# Patient Record
Sex: FEMALE | Race: WHITE | NOT HISPANIC OR LATINO | Employment: OTHER | URBAN - METROPOLITAN AREA
[De-identification: names, ages, dates, MRNs, and addresses within clinical notes are randomized per-mention and may not be internally consistent; named-entity substitution may affect disease eponyms.]

---

## 2017-05-04 ENCOUNTER — CONVERSION ENCOUNTER (OUTPATIENT)
Dept: OTHER | Facility: HOSPITAL | Age: 62
End: 2017-05-04

## 2018-02-12 ENCOUNTER — OFFICE VISIT CONVERTED (OUTPATIENT)
Dept: FAMILY MEDICINE CLINIC | Age: 63
End: 2018-02-12
Attending: NURSE PRACTITIONER

## 2018-03-22 ENCOUNTER — OFFICE VISIT CONVERTED (OUTPATIENT)
Dept: FAMILY MEDICINE CLINIC | Age: 63
End: 2018-03-22
Attending: NURSE PRACTITIONER

## 2018-04-30 ENCOUNTER — OFFICE VISIT CONVERTED (OUTPATIENT)
Dept: FAMILY MEDICINE CLINIC | Age: 63
End: 2018-04-30
Attending: NURSE PRACTITIONER

## 2018-07-23 ENCOUNTER — OFFICE VISIT CONVERTED (OUTPATIENT)
Dept: FAMILY MEDICINE CLINIC | Age: 63
End: 2018-07-23
Attending: NURSE PRACTITIONER

## 2018-08-08 ENCOUNTER — CONVERSION ENCOUNTER (OUTPATIENT)
Dept: OTHER | Facility: HOSPITAL | Age: 63
End: 2018-08-08

## 2018-09-11 ENCOUNTER — HOSPITAL ENCOUNTER (OUTPATIENT)
Dept: CARDIOLOGY | Facility: HOSPITAL | Age: 63
Discharge: HOME | End: 2018-09-11
Attending: SURGERY

## 2018-09-11 ENCOUNTER — TRANSCRIBE ORDERS (OUTPATIENT)
Dept: ADMINISTRATIVE | Facility: HOSPITAL | Age: 63
End: 2018-09-11

## 2018-09-11 ENCOUNTER — HOSPITAL ENCOUNTER (OUTPATIENT)
Dept: CARDIOLOGY | Facility: HOSPITAL | Age: 63
Discharge: HOME | End: 2018-09-11
Admitting: SURGERY

## 2018-09-11 DIAGNOSIS — I10 ESSENTIAL HYPERTENSION, MALIGNANT: ICD-10-CM

## 2018-09-11 DIAGNOSIS — I10 ESSENTIAL HYPERTENSION, MALIGNANT: Primary | ICD-10-CM

## 2018-09-11 PROCEDURE — 93005 ELECTROCARDIOGRAM TRACING: CPT | Performed by: SURGERY

## 2018-09-11 PROCEDURE — 93010 ELECTROCARDIOGRAM REPORT: CPT | Performed by: INTERNAL MEDICINE

## 2018-12-10 ENCOUNTER — OFFICE VISIT CONVERTED (OUTPATIENT)
Dept: FAMILY MEDICINE CLINIC | Age: 63
End: 2018-12-10
Attending: NURSE PRACTITIONER

## 2019-02-13 ENCOUNTER — OFFICE VISIT CONVERTED (OUTPATIENT)
Dept: FAMILY MEDICINE CLINIC | Age: 64
End: 2019-02-13
Attending: NURSE PRACTITIONER

## 2019-06-17 ENCOUNTER — OFFICE VISIT CONVERTED (OUTPATIENT)
Dept: FAMILY MEDICINE CLINIC | Age: 64
End: 2019-06-17
Attending: NURSE PRACTITIONER

## 2019-06-17 ENCOUNTER — HOSPITAL ENCOUNTER (OUTPATIENT)
Dept: OTHER | Facility: HOSPITAL | Age: 64
Discharge: HOME | End: 2019-06-17
Attending: NURSE PRACTITIONER

## 2019-06-17 LAB
ALBUMIN SERPL-MCNC: 4.2 G/DL (ref 3.5–5)
ALBUMIN/GLOB SERPL: 1.4 {RATIO} (ref 1.4–2.6)
ALP SERPL-CCNC: 115 U/L (ref 43–160)
ALT SERPL-CCNC: 14 U/L (ref 10–40)
ANION GAP SERPL CALC-SCNC: 16 MMOL/L (ref 8–19)
AST SERPL-CCNC: 15 U/L (ref 15–50)
BILIRUB SERPL-MCNC: 0.35 MG/DL (ref 0.2–1.3)
BUN SERPL-MCNC: 13 MG/DL (ref 5–25)
BUN/CREAT SERPL: 13 {RATIO} (ref 6–20)
CALCIUM SERPL-MCNC: 9.6 MG/DL (ref 8.7–10.4)
CHLORIDE SERPL-SCNC: 102 MMOL/L (ref 99–111)
CHOLEST SERPL-MCNC: 185 MG/DL (ref 107–200)
CHOLEST/HDLC SERPL: 2.9 {RATIO} (ref 3–6)
CONV CO2: 28 MMOL/L (ref 22–32)
CONV CREATININE URINE, RANDOM: 114 MG/DL (ref 10–300)
CONV MICROALBUM.,U,RANDOM: <12 MG/L (ref 0–20)
CONV TOTAL PROTEIN: 7.1 G/DL (ref 6.3–8.2)
CREAT UR-MCNC: 1 MG/DL (ref 0.5–0.9)
EST. AVERAGE GLUCOSE BLD GHB EST-MCNC: 137 MG/DL
GFR SERPLBLD BASED ON 1.73 SQ M-ARVRAT: 60 ML/MIN/{1.73_M2}
GLOBULIN UR ELPH-MCNC: 2.9 G/DL (ref 2–3.5)
GLUCOSE SERPL-MCNC: 134 MG/DL (ref 65–99)
HBA1C MFR BLD: 6.4 % (ref 3.5–5.7)
HDLC SERPL-MCNC: 63 MG/DL (ref 40–60)
LDLC SERPL CALC-MCNC: 105 MG/DL (ref 70–100)
MICROALBUMIN/CREAT UR: 10.5 MG/G{CRE} (ref 0–35)
OSMOLALITY SERPL CALC.SUM OF ELEC: 296 MOSM/KG (ref 273–304)
POTASSIUM SERPL-SCNC: 4.3 MMOL/L (ref 3.5–5.3)
SODIUM SERPL-SCNC: 142 MMOL/L (ref 135–147)
TRIGL SERPL-MCNC: 84 MG/DL (ref 40–150)
TSH SERPL-ACNC: 2.38 M[IU]/L (ref 0.27–4.2)
VLDLC SERPL-MCNC: 17 MG/DL (ref 5–37)

## 2019-07-16 ENCOUNTER — OFFICE VISIT CONVERTED (OUTPATIENT)
Dept: FAMILY MEDICINE CLINIC | Age: 64
End: 2019-07-16
Attending: NURSE PRACTITIONER

## 2019-09-24 ENCOUNTER — HOSPITAL ENCOUNTER (OUTPATIENT)
Dept: OTHER | Facility: HOSPITAL | Age: 64
Discharge: HOME | End: 2019-09-24
Attending: NURSE PRACTITIONER

## 2019-10-08 ENCOUNTER — OFFICE VISIT CONVERTED (OUTPATIENT)
Dept: FAMILY MEDICINE CLINIC | Age: 64
End: 2019-10-08
Attending: NURSE PRACTITIONER

## 2019-11-26 ENCOUNTER — HOSPITAL ENCOUNTER (OUTPATIENT)
Dept: OTHER | Facility: HOSPITAL | Age: 64
Discharge: HOME | End: 2019-11-26
Attending: NURSE PRACTITIONER

## 2019-11-26 LAB
APPEARANCE UR: ABNORMAL
BACTERIA UR CULT: NORMAL
BACTERIA UR QL AUTO: ABNORMAL
BILIRUB UR QL: NEGATIVE
CASTS URNS QL MICRO: ABNORMAL /[LPF]
COLOR UR: YELLOW
CONV LEUKOCYTE ESTERASE: ABNORMAL
CONV UROBILINOGEN IN URINE BY AUTOMATED TEST STRIP: 0.2 {EHRLICHU}/DL (ref 0.1–1)
EPI CELLS #/AREA URNS HPF: ABNORMAL /[HPF]
GLUCOSE 24H UR-MCNC: NEGATIVE MG/DL
HGB UR QL STRIP: ABNORMAL
KETONES UR QL STRIP: NEGATIVE MG/DL
MUCOUS THREADS URNS QL MICRO: ABNORMAL
NITRITE UR-MCNC: POSITIVE MG/ML
PH UR STRIP.AUTO: 7 [PH] (ref 5–8)
PROT UR-MCNC: NEGATIVE MG/DL
RBC # BLD AUTO: ABNORMAL /[HPF]
SP GR UR STRIP: 1.02 (ref 1–1.03)
SPECIMEN SOURCE: ABNORMAL
UNIDENT CRYS URNS QL MICRO: ABNORMAL /[HPF]
WBC #/AREA URNS HPF: ABNORMAL /[HPF]

## 2019-11-28 LAB
AMOXICILLIN+CLAV SUSC ISLT: <=2
AMPICILLIN SUSC ISLT: <=2
AMPICILLIN+SULBAC SUSC ISLT: <=2
BACTERIA UR CULT: ABNORMAL
CEFAZOLIN SUSC ISLT: <=4
CEFEPIME SUSC ISLT: <=1
CEFTAZIDIME SUSC ISLT: <=1
CEFTRIAXONE SUSC ISLT: <=1
CEFUROXIME ORAL SUSC ISLT: <=1
CEFUROXIME PARENTER SUSC ISLT: <=1
CIPROFLOXACIN SUSC ISLT: <=0.25
ERTAPENEM SUSC ISLT: <=0.5
GENTAMICIN SUSC ISLT: <=1
LEVOFLOXACIN SUSC ISLT: <=0.12
NITROFURANTOIN SUSC ISLT: <=16
TETRACYCLINE SUSC ISLT: <=1
TMP SMX SUSC ISLT: <=20
TOBRAMYCIN SUSC ISLT: <=1

## 2019-12-12 ENCOUNTER — HOSPITAL ENCOUNTER (OUTPATIENT)
Dept: OTHER | Facility: HOSPITAL | Age: 64
Discharge: HOME | End: 2019-12-12
Attending: NURSE PRACTITIONER

## 2019-12-12 LAB
ALBUMIN SERPL-MCNC: 3.9 G/DL (ref 3.5–5)
ALBUMIN/GLOB SERPL: 1.4 {RATIO} (ref 1.4–2.6)
ALP SERPL-CCNC: 103 U/L (ref 43–160)
ALT SERPL-CCNC: 10 U/L (ref 10–40)
ANION GAP SERPL CALC-SCNC: 15 MMOL/L (ref 8–19)
AST SERPL-CCNC: 12 U/L (ref 15–50)
BILIRUB SERPL-MCNC: 0.36 MG/DL (ref 0.2–1.3)
BUN SERPL-MCNC: 13 MG/DL (ref 5–25)
BUN/CREAT SERPL: 14 {RATIO} (ref 6–20)
CALCIUM SERPL-MCNC: 9.3 MG/DL (ref 8.7–10.4)
CHLORIDE SERPL-SCNC: 101 MMOL/L (ref 99–111)
CHOLEST SERPL-MCNC: 203 MG/DL (ref 107–200)
CHOLEST/HDLC SERPL: 3.4 {RATIO} (ref 3–6)
CONV CO2: 27 MMOL/L (ref 22–32)
CONV TOTAL PROTEIN: 6.7 G/DL (ref 6.3–8.2)
CREAT UR-MCNC: 0.93 MG/DL (ref 0.5–0.9)
EST. AVERAGE GLUCOSE BLD GHB EST-MCNC: 151 MG/DL
GFR SERPLBLD BASED ON 1.73 SQ M-ARVRAT: >60 ML/MIN/{1.73_M2}
GLOBULIN UR ELPH-MCNC: 2.8 G/DL (ref 2–3.5)
GLUCOSE SERPL-MCNC: 149 MG/DL (ref 65–99)
HBA1C MFR BLD: 6.9 % (ref 3.5–5.7)
HDLC SERPL-MCNC: 59 MG/DL (ref 40–60)
LDLC SERPL CALC-MCNC: 123 MG/DL (ref 70–100)
OSMOLALITY SERPL CALC.SUM OF ELEC: 291 MOSM/KG (ref 273–304)
POTASSIUM SERPL-SCNC: 4.2 MMOL/L (ref 3.5–5.3)
SODIUM SERPL-SCNC: 139 MMOL/L (ref 135–147)
T4 FREE SERPL-MCNC: 1.6 NG/DL (ref 0.9–1.8)
TRIGL SERPL-MCNC: 106 MG/DL (ref 40–150)
TSH SERPL-ACNC: 0.18 M[IU]/L (ref 0.27–4.2)
VLDLC SERPL-MCNC: 21 MG/DL (ref 5–37)

## 2020-02-18 ENCOUNTER — HOSPITAL ENCOUNTER (OUTPATIENT)
Dept: OTHER | Facility: HOSPITAL | Age: 65
Discharge: HOME | End: 2020-02-18
Attending: NURSE PRACTITIONER

## 2020-02-18 LAB — TSH SERPL-ACNC: 2.5 M[IU]/L (ref 0.27–4.2)

## 2020-05-06 ENCOUNTER — OFFICE VISIT CONVERTED (OUTPATIENT)
Dept: FAMILY MEDICINE CLINIC | Age: 65
End: 2020-05-06
Attending: NURSE PRACTITIONER

## 2020-05-19 ENCOUNTER — HOSPITAL ENCOUNTER (OUTPATIENT)
Dept: OTHER | Facility: HOSPITAL | Age: 65
Discharge: HOME | End: 2020-05-19
Attending: NURSE PRACTITIONER

## 2020-05-19 LAB
ALBUMIN SERPL-MCNC: 3.9 G/DL (ref 3.5–5)
ALBUMIN/GLOB SERPL: 1.4 {RATIO} (ref 1.4–2.6)
ALP SERPL-CCNC: 110 U/L (ref 43–160)
ALT SERPL-CCNC: 11 U/L (ref 10–40)
ANION GAP SERPL CALC-SCNC: 15 MMOL/L (ref 8–19)
AST SERPL-CCNC: 13 U/L (ref 15–50)
BILIRUB SERPL-MCNC: 0.34 MG/DL (ref 0.2–1.3)
BUN SERPL-MCNC: 16 MG/DL (ref 5–25)
BUN/CREAT SERPL: 19 {RATIO} (ref 6–20)
CALCIUM SERPL-MCNC: 9.5 MG/DL (ref 8.7–10.4)
CHLORIDE SERPL-SCNC: 100 MMOL/L (ref 99–111)
CHOLEST SERPL-MCNC: 207 MG/DL (ref 107–200)
CHOLEST/HDLC SERPL: 3.6 {RATIO} (ref 3–6)
CONV CO2: 27 MMOL/L (ref 22–32)
CONV CREATININE URINE, RANDOM: 97.8 MG/DL (ref 10–300)
CONV MICROALBUM.,U,RANDOM: <12 MG/L (ref 0–20)
CONV TOTAL PROTEIN: 6.6 G/DL (ref 6.3–8.2)
CREAT UR-MCNC: 0.85 MG/DL (ref 0.5–0.9)
EST. AVERAGE GLUCOSE BLD GHB EST-MCNC: 151 MG/DL
GFR SERPLBLD BASED ON 1.73 SQ M-ARVRAT: >60 ML/MIN/{1.73_M2}
GLOBULIN UR ELPH-MCNC: 2.7 G/DL (ref 2–3.5)
GLUCOSE SERPL-MCNC: 136 MG/DL (ref 65–99)
HBA1C MFR BLD: 6.9 % (ref 3.5–5.7)
HDLC SERPL-MCNC: 58 MG/DL (ref 40–60)
LDLC SERPL CALC-MCNC: 127 MG/DL (ref 70–100)
MICROALBUMIN/CREAT UR: 12.3 MG/G{CRE} (ref 0–35)
OSMOLALITY SERPL CALC.SUM OF ELEC: 289 MOSM/KG (ref 273–304)
POTASSIUM SERPL-SCNC: 4.2 MMOL/L (ref 3.5–5.3)
SODIUM SERPL-SCNC: 138 MMOL/L (ref 135–147)
TRIGL SERPL-MCNC: 111 MG/DL (ref 40–150)
TSH SERPL-ACNC: 0.91 M[IU]/L (ref 0.27–4.2)
VLDLC SERPL-MCNC: 22 MG/DL (ref 5–37)

## 2020-09-28 ENCOUNTER — OFFICE VISIT CONVERTED (OUTPATIENT)
Dept: FAMILY MEDICINE CLINIC | Age: 65
End: 2020-09-28
Attending: NURSE PRACTITIONER

## 2020-09-30 ENCOUNTER — HOSPITAL ENCOUNTER (OUTPATIENT)
Dept: OTHER | Facility: HOSPITAL | Age: 65
Discharge: HOME | End: 2020-09-30
Attending: NURSE PRACTITIONER

## 2020-09-30 LAB
ALBUMIN SERPL-MCNC: 4 G/DL (ref 3.5–5)
ALBUMIN/GLOB SERPL: 1.5 {RATIO} (ref 1.4–2.6)
ALP SERPL-CCNC: 104 U/L (ref 43–160)
ALT SERPL-CCNC: 11 U/L (ref 10–40)
ANION GAP SERPL CALC-SCNC: 14 MMOL/L (ref 8–19)
AST SERPL-CCNC: 14 U/L (ref 15–50)
BILIRUB SERPL-MCNC: 0.34 MG/DL (ref 0.2–1.3)
BUN SERPL-MCNC: 18 MG/DL (ref 5–25)
BUN/CREAT SERPL: 17 {RATIO} (ref 6–20)
CALCIUM SERPL-MCNC: 9.4 MG/DL (ref 8.7–10.4)
CHLORIDE SERPL-SCNC: 101 MMOL/L (ref 99–111)
CHOLEST SERPL-MCNC: 218 MG/DL (ref 107–200)
CHOLEST/HDLC SERPL: 3.3 {RATIO} (ref 3–6)
CONV CO2: 27 MMOL/L (ref 22–32)
CONV TOTAL PROTEIN: 6.6 G/DL (ref 6.3–8.2)
CREAT UR-MCNC: 1.03 MG/DL (ref 0.5–0.9)
EST. AVERAGE GLUCOSE BLD GHB EST-MCNC: 140 MG/DL
GFR SERPLBLD BASED ON 1.73 SQ M-ARVRAT: 57 ML/MIN/{1.73_M2}
GLOBULIN UR ELPH-MCNC: 2.6 G/DL (ref 2–3.5)
GLUCOSE SERPL-MCNC: 164 MG/DL (ref 65–99)
HBA1C MFR BLD: 6.5 % (ref 3.5–5.7)
HDLC SERPL-MCNC: 66 MG/DL (ref 40–60)
LDLC SERPL CALC-MCNC: 133 MG/DL (ref 70–100)
OSMOLALITY SERPL CALC.SUM OF ELEC: 292 MOSM/KG (ref 273–304)
POTASSIUM SERPL-SCNC: 4.4 MMOL/L (ref 3.5–5.3)
SODIUM SERPL-SCNC: 138 MMOL/L (ref 135–147)
TRIGL SERPL-MCNC: 97 MG/DL (ref 40–150)
TSH SERPL-ACNC: 0.51 M[IU]/L (ref 0.27–4.2)
VLDLC SERPL-MCNC: 19 MG/DL (ref 5–37)

## 2020-11-09 ENCOUNTER — OFFICE VISIT CONVERTED (OUTPATIENT)
Dept: FAMILY MEDICINE CLINIC | Age: 65
End: 2020-11-09
Attending: NURSE PRACTITIONER

## 2020-12-08 ENCOUNTER — HOSPITAL ENCOUNTER (OUTPATIENT)
Dept: OTHER | Facility: HOSPITAL | Age: 65
Discharge: HOME | End: 2020-12-08
Attending: NURSE PRACTITIONER

## 2020-12-08 ENCOUNTER — OFFICE VISIT CONVERTED (OUTPATIENT)
Dept: FAMILY MEDICINE CLINIC | Age: 65
End: 2020-12-08
Attending: NURSE PRACTITIONER

## 2020-12-08 LAB
ANION GAP SERPL CALC-SCNC: 13 MMOL/L (ref 8–19)
BUN SERPL-MCNC: 16 MG/DL (ref 5–25)
BUN/CREAT SERPL: 14 {RATIO} (ref 6–20)
CALCIUM SERPL-MCNC: 9.4 MG/DL (ref 8.7–10.4)
CHLORIDE SERPL-SCNC: 102 MMOL/L (ref 99–111)
CONV CO2: 26 MMOL/L (ref 22–32)
CREAT UR-MCNC: 1.12 MG/DL (ref 0.5–0.9)
GFR SERPLBLD BASED ON 1.73 SQ M-ARVRAT: 52 ML/MIN/{1.73_M2}
GLUCOSE SERPL-MCNC: 157 MG/DL (ref 65–99)
OSMOLALITY SERPL CALC.SUM OF ELEC: 288 MOSM/KG (ref 273–304)
POTASSIUM SERPL-SCNC: 4.2 MMOL/L (ref 3.5–5.3)
SODIUM SERPL-SCNC: 137 MMOL/L (ref 135–147)

## 2021-01-05 ENCOUNTER — HOSPITAL ENCOUNTER (OUTPATIENT)
Dept: OTHER | Facility: HOSPITAL | Age: 66
Discharge: HOME | End: 2021-01-05
Attending: INTERNAL MEDICINE

## 2021-01-15 ENCOUNTER — HOSPITAL ENCOUNTER (OUTPATIENT)
Dept: OTHER | Facility: HOSPITAL | Age: 66
Discharge: HOME | End: 2021-01-15
Attending: NURSE PRACTITIONER

## 2021-01-19 ENCOUNTER — HOSPITAL ENCOUNTER (OUTPATIENT)
Dept: OTHER | Facility: HOSPITAL | Age: 66
Discharge: HOME | End: 2021-01-19
Attending: INTERNAL MEDICINE

## 2021-01-19 LAB
ALBUMIN SERPL-MCNC: 4.1 G/DL (ref 3.5–5)
ALBUMIN/GLOB SERPL: 1.4 {RATIO} (ref 1.4–2.6)
ALP SERPL-CCNC: 129 U/L (ref 43–160)
ALT SERPL-CCNC: 12 U/L (ref 10–40)
ANION GAP SERPL CALC-SCNC: 16 MMOL/L (ref 8–19)
APPEARANCE UR: NORMAL
AST SERPL-CCNC: 16 U/L (ref 15–50)
BASOPHILS # BLD MANUAL: 0.04 10*3/UL (ref 0–0.2)
BASOPHILS NFR BLD MANUAL: 0.5 % (ref 0–3)
BILIRUB SERPL-MCNC: 0.34 MG/DL (ref 0.2–1.3)
BILIRUB UR QL: NEGATIVE
BUN SERPL-MCNC: 16 MG/DL (ref 5–25)
BUN/CREAT SERPL: 17 {RATIO} (ref 6–20)
CALCIUM SERPL-MCNC: 9.7 MG/DL (ref 8.7–10.4)
CHLORIDE SERPL-SCNC: 99 MMOL/L (ref 99–111)
CHOLEST SERPL-MCNC: 146 MG/DL (ref 107–200)
CHOLEST/HDLC SERPL: 2.2 {RATIO} (ref 3–6)
COLOR UR: YELLOW
CONV CO2: 27 MMOL/L (ref 22–32)
CONV CREATININE URINE, RANDOM: 143 MG/DL (ref 10–300)
CONV LEUKOCYTE ESTERASE: NEGATIVE
CONV MICROALBUM.,U,RANDOM: <12 MG/L (ref 0–20)
CONV PROTEIN TO CREATININE RATIO (RANDOM URINE): 0.09 {RATIO} (ref 0–0.1)
CONV TOTAL PROTEIN: 7 G/DL (ref 6.3–8.2)
CONV UROBILINOGEN IN URINE BY AUTOMATED TEST STRIP: 0.2 {EHRLICHU}/DL (ref 0.1–1)
CREAT UR-MCNC: 0.96 MG/DL (ref 0.5–0.9)
DEPRECATED RDW RBC AUTO: 46.5 FL
EOSINOPHIL # BLD MANUAL: 0.26 10*3/UL (ref 0–0.7)
EOSINOPHIL NFR BLD MANUAL: 3.5 % (ref 0–7)
ERYTHROCYTE [DISTWIDTH] IN BLOOD BY AUTOMATED COUNT: 14.4 % (ref 11.5–14.5)
EST. AVERAGE GLUCOSE BLD GHB EST-MCNC: 163 MG/DL
GFR SERPLBLD BASED ON 1.73 SQ M-ARVRAT: >60 ML/MIN/{1.73_M2}
GLOBULIN UR ELPH-MCNC: 2.9 G/DL (ref 2–3.5)
GLUCOSE 24H UR-MCNC: NEGATIVE MG/DL
GLUCOSE SERPL-MCNC: 157 MG/DL (ref 65–99)
GRANS (ABSOLUTE): 3.93 10*3/UL (ref 2–8)
GRANS: 52.6 % (ref 30–85)
HBA1C MFR BLD: 12.9 G/DL (ref 12–16)
HBA1C MFR BLD: 7.3 % (ref 3.5–5.7)
HCT VFR BLD AUTO: 40 % (ref 37–47)
HDLC SERPL-MCNC: 65 MG/DL (ref 40–60)
HGB UR QL STRIP: NEGATIVE
IMM GRANULOCYTES # BLD: 0.01 10*3/UL (ref 0–0.54)
IMM GRANULOCYTES NFR BLD: 0.1 % (ref 0–0.43)
KETONES UR QL STRIP: NEGATIVE MG/DL
LDLC SERPL CALC-MCNC: 65 MG/DL (ref 70–100)
LYMPHOCYTES # BLD MANUAL: 2.71 10*3/UL (ref 1–5)
LYMPHOCYTES NFR BLD MANUAL: 7.1 % (ref 3–10)
MCH RBC QN AUTO: 28.4 PG (ref 27–31)
MCHC RBC AUTO-ENTMCNC: 32.3 G/DL (ref 33–37)
MCV RBC AUTO: 88.1 FL (ref 81–99)
MICROALBUMIN/CREAT UR: 8.4 MG/G{CRE} (ref 0–35)
MONOCYTES # BLD AUTO: 0.53 10*3/UL (ref 0.2–1.2)
NITRITE UR-MCNC: NEGATIVE MG/ML
OSMOLALITY SERPL CALC.SUM OF ELEC: 290 MOSM/KG (ref 273–304)
PH UR STRIP.AUTO: 6.5 [PH] (ref 5–8)
PLATELET # BLD AUTO: 313 10*3/UL (ref 130–400)
PMV BLD AUTO: 9.5 FL (ref 7.4–10.4)
POTASSIUM SERPL-SCNC: 4.4 MMOL/L (ref 3.5–5.3)
PROT UR-MCNC: 13.5 MG/DL
PROT UR-MCNC: NEGATIVE MG/DL
RBC # BLD AUTO: 4.54 10*6/UL (ref 4.2–5.4)
SODIUM SERPL-SCNC: 138 MMOL/L (ref 135–147)
SP GR UR STRIP: 1.02 (ref 1–1.03)
SPECIMEN SOURCE: NORMAL
TRIGL SERPL-MCNC: 80 MG/DL (ref 40–150)
VARIANT LYMPHS NFR BLD MANUAL: 36.2 % (ref 20–45)
VLDLC SERPL-MCNC: 16 MG/DL (ref 5–37)
WBC # BLD AUTO: 7.48 10*3/UL (ref 4.8–10.8)

## 2021-02-22 ENCOUNTER — OFFICE VISIT CONVERTED (OUTPATIENT)
Dept: FAMILY MEDICINE CLINIC | Age: 66
End: 2021-02-22
Attending: NURSE PRACTITIONER

## 2021-04-21 ENCOUNTER — HOSPITAL ENCOUNTER (OUTPATIENT)
Dept: OTHER | Facility: HOSPITAL | Age: 66
Discharge: HOME | End: 2021-04-21
Attending: NURSE PRACTITIONER

## 2021-04-21 LAB
ALBUMIN SERPL-MCNC: 3.9 G/DL (ref 3.5–5)
ALBUMIN/GLOB SERPL: 1.4 {RATIO} (ref 1.4–2.6)
ALP SERPL-CCNC: 94 U/L (ref 43–160)
ALT SERPL-CCNC: 15 U/L (ref 10–40)
ANION GAP SERPL CALC-SCNC: 16 MMOL/L (ref 8–19)
AST SERPL-CCNC: 19 U/L (ref 15–50)
BILIRUB SERPL-MCNC: 0.35 MG/DL (ref 0.2–1.3)
BUN SERPL-MCNC: 14 MG/DL (ref 5–25)
BUN/CREAT SERPL: 13 {RATIO} (ref 6–20)
CALCIUM SERPL-MCNC: 9.5 MG/DL (ref 8.7–10.4)
CHLORIDE SERPL-SCNC: 100 MMOL/L (ref 99–111)
CHOLEST SERPL-MCNC: 148 MG/DL (ref 107–200)
CHOLEST/HDLC SERPL: 2.3 {RATIO} (ref 3–6)
CONV CO2: 26 MMOL/L (ref 22–32)
CONV TOTAL PROTEIN: 6.7 G/DL (ref 6.3–8.2)
CREAT UR-MCNC: 1.04 MG/DL (ref 0.5–0.9)
EST. AVERAGE GLUCOSE BLD GHB EST-MCNC: 163 MG/DL
GFR SERPLBLD BASED ON 1.73 SQ M-ARVRAT: 56 ML/MIN/{1.73_M2}
GLOBULIN UR ELPH-MCNC: 2.8 G/DL (ref 2–3.5)
GLUCOSE SERPL-MCNC: 142 MG/DL (ref 65–99)
HBA1C MFR BLD: 7.3 % (ref 3.5–5.7)
HDLC SERPL-MCNC: 65 MG/DL (ref 40–60)
LDLC SERPL CALC-MCNC: 65 MG/DL (ref 70–100)
OSMOLALITY SERPL CALC.SUM OF ELEC: 289 MOSM/KG (ref 273–304)
POTASSIUM SERPL-SCNC: 4.3 MMOL/L (ref 3.5–5.3)
SODIUM SERPL-SCNC: 138 MMOL/L (ref 135–147)
TRIGL SERPL-MCNC: 92 MG/DL (ref 40–150)
TSH SERPL-ACNC: 1.4 M[IU]/L (ref 0.27–4.2)
VLDLC SERPL-MCNC: 18 MG/DL (ref 5–37)

## 2021-04-26 ENCOUNTER — OFFICE VISIT CONVERTED (OUTPATIENT)
Dept: FAMILY MEDICINE CLINIC | Age: 66
End: 2021-04-26
Attending: NURSE PRACTITIONER

## 2021-05-10 ENCOUNTER — HOSPITAL ENCOUNTER (OUTPATIENT)
Dept: OTHER | Facility: HOSPITAL | Age: 66
Discharge: HOME | End: 2021-05-10
Attending: NURSE PRACTITIONER

## 2021-05-10 LAB
ANION GAP SERPL CALC-SCNC: 13 MMOL/L (ref 8–19)
BUN SERPL-MCNC: 12 MG/DL (ref 5–25)
BUN/CREAT SERPL: 13 {RATIO} (ref 6–20)
CALCIUM SERPL-MCNC: 9.1 MG/DL (ref 8.7–10.4)
CHLORIDE SERPL-SCNC: 101 MMOL/L (ref 99–111)
CONV CO2: 28 MMOL/L (ref 22–32)
CREAT UR-MCNC: 0.95 MG/DL (ref 0.5–0.9)
GFR SERPLBLD BASED ON 1.73 SQ M-ARVRAT: >60 ML/MIN/{1.73_M2}
GLUCOSE SERPL-MCNC: 177 MG/DL (ref 65–99)
OSMOLALITY SERPL CALC.SUM OF ELEC: 288 MOSM/KG (ref 273–304)
POTASSIUM SERPL-SCNC: 4.5 MMOL/L (ref 3.5–5.3)
SODIUM SERPL-SCNC: 137 MMOL/L (ref 135–147)

## 2021-05-18 NOTE — PROGRESS NOTES
"Guillermina Gardner 1955     Office/Outpatient Visit    Visit Date:  02:26 pm    Provider: Julieta Forte N.P. (Assistant: Tamika Liu MA)    Location: [facility]        Electronically signed by Julieta Forte N.P. on  2019 09:10:20 AM                             SUBJECTIVE:        CC:     Mrs. Gardner is a 64 year old White female.  She presents with sinus drainage, sinus pressure, headache. Started 3 weeks ago.          HPI:         URI noted.  These have been present for the past 3 weeks.  The symptoms include sometimes dry, sometimes productive cough, \"sinus\" headache, nasal congestion and purulent nasal discharge.  She denies fever or wheezing.  She denies exposure to ill contacts.  She has already tried to relieve the symptoms with antihistamines ( cetirizine ).  Medical history is significant for allergies.      ROS:     CONSTITUTIONAL:  Positive for fatigue.   Negative for fever.      E/N/T:  Positive for nasal congestion, frequent rhinorrhea and hoarseness.   Negative for sore throat.      CARDIOVASCULAR:  Negative for chest pain, palpitations, tachycardia, orthopnea, and edema.      RESPIRATORY:  Positive for recent cough ( with scant amounts of purulent sputum ).   Negative for frequent wheezing.      GASTROINTESTINAL:  Negative for nausea and vomiting.      MUSCULOSKELETAL:  Negative for arthralgias, back pain, and myalgias.      NEUROLOGICAL:  Positive for headaches ( \"sinus\" ).   Negative for dizziness.      ALLERGIC/IMMUNOLOGIC:  Positive for seasonal allergies.          PMH/FM/SH:     Last Reviewed on 2019 10:36 AM by Cecile Dennis    Past Medical History:                 PAST MEDICAL HISTORY         Dizziness - Seeing Dr. Malik         GYNECOLOGICAL HISTORY:             PREVENTIVE HEALTH MAINTENANCE             Hepatitis C Medicare Screening: was last done 3-17-18; negative     MAMMOGRAM: Done within last 2 years and results in are " chart was last done 2018 stable         Surgical History:         : X 2;      Lap Band ; Procedures: Ablation     Positive for    thumb joint left replaced/repaired 8-6-15; and    right thumb replaced 18;;     COLONOSCOPY: was last done 2016 with normal results Dr Rosa Forte         Family History:     Father:  at age 89;  Type 2 Diabetes; Hypothyroidism;  Macular degeneration     Mother:  at age 90;  Hypertension;  Dementia     Sister(s): Hypertension; Endocrine Hypothyroidism         Social History:     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              Current Problems:     Hip pain     Fever, unspecified     Patient visit for long term (current) drug use; other     Dizziness     Joint pain     Low back pain     Foot pain     Fatigue     Essential hypertension     Acquired hypothyroidism     Hypercholesterolemia     Type 2 diabetes     Thumb pain     Anxiety with depression     Headache         Immunizations:     Hep A, adult dose 2018     Hep A, adult dose 12/10/2018     zzFluzone pf-quadrivalent 3 and up 10/7/2016     zzFluzone pf-quadrivalent 3 and up 2017     Fluzone (3 + years dose) 10/18/2006     Fluzone (3 + years dose) 2007     Fluzone (3 + years dose) 10/2/2008     Fluzone (3 + years dose) 2009     Fluzone (3 + years dose) 2010     Fluzone (3 + years dose) 10/17/2011     Fluzone (3 + years dose) 2012     Fluzone (3 + years dose) 10/7/2014     Fluzone pf (3+ years dose) 2013     Fluzone pf (3+ years dose) 2015     Fluzone Quadrivalent (3+ years) 2018     Influenza A (H1N1) pf, IM (3+ years) Monovalent 2009     Adacel (Tdap) 2010     Boostrix (Tdap)  5/11/2018         Allergies:     Last Reviewed on 6/17/2019 10:16 AM by Aisha Thompson    Aldomet:        Current Medications:     Last Reviewed on 7/16/2019 02:32 PM by Tamika Liu    Atenolol 25mg Tablet Take 1 tablet(s) by mouth daily     Cymbalta 60mg Capsules, Delayed Release Take 2 capsule(s) by mouth  QD     Glucophage 500mg Tablet Take 2 tablet(s) by mouth bid     Hydrochlorothiazide (HCTZ) 25mg Tablet 1/2 to 1 pill daily     Januvia 50mg Tablet Take 1 tablet(s) by mouth daily     Prinivil 10mg Tablet Take 1 tablet(s) by mouth daily     Synthroid 0.125mg Tablet One PO QD  DNS     Trazodone HCl 50mg Tablet 1 - 2  @ HS prn     ProAir HFA 90mcg/1actuation Oral Inhaler 1-2 puffs qid prn     Vagifem 10mcg Vaginal Tablets insert qod     Glucose Reagent Blood Test Strips  Reagent Strips Check blood sugar daily with One Touch Ultra     Lancet   Lancet Check blood once daily as directed wi One Touch Ultra     Wellbutrin XL 300mg Tablets, Extended Release 1 Tab daily     Xiidra 5% Ophthalmic Solution Instill 1 drop(s) to each eye q12h     Cholestoff 1800 daily     Cetirizine HCl 10mg Capsules Take 1 capsule(s) by mouth daily     Vitamin D3 1,000IU Capsules Take 1 capsule(s) by mouth daily     Mucinex 600mg Tablets, Extended Release PRN     eye vitamin         OBJECTIVE:        Vitals:         Historical:     06/17/2019  BP:   111/60 mm Hg ( (left arm, , sitting, );)     06/17/2019  Wt:   202lbs        Current: 7/16/2019 2:34:40 PM    Ht:  5 ft, 4.25 in;  Wt: 202.4 lbs;  BMI: 34.5    T: 98.5 F (oral);  BP: 109/68 mm Hg (right arm, sitting);  P: 84 bpm (right arm (BP Cuff), sitting);  sCr: 1 mg/dL;  GFR: 65.23    O2 Sat: 98 % (room air)        Exams:     PHYSICAL EXAM:     GENERAL: no apparent distress;     E/N/T: EARS: bilateral TMs are normal;  NOSE: nasal mucosa is erythematous;  bilateral maxillary sinus tenderness present; OROPHARYNX: posterior pharynx, including tonsils, tongue, and uvula are normal;      RESPIRATORY: normal respiratory rate and pattern with no distress; normal breath sounds with no rales, rhonchi, wheezes or rubs;     CARDIOVASCULAR: normal rate; rhythm is regular;     LYMPHATIC: no enlargement of cervical or facial nodes;     MUSCULOSKELETAL:  Normal range of motion, strength and tone;     NEUROLOGIC: mental status: alert and oriented x 3; GROSSLY INTACT     PSYCHIATRIC:  appropriate affect and demeanor; normal speech pattern; grossly normal memory;         ASSESSMENT           461.0   J01.00  Acute maxillary sinusitis              DDx:     786.2   R05  Cough              DDx:         ORDERS:         Meds Prescribed:       Augmentin (Amoxicillin/Clavulanate) 875mg/125mg Tablet 1 po bid with food  #14 (Fourteen) tablet(s) Refills: 0       Refill of: Codeine/Guaifenesin 10mg/100mg per 5ml Oral Liquid 1 tsp at bedtime, sugar free  #4 (Four) oz Refills: 0                 PLAN:          Acute maxillary sinusitis         RECOMMENDATIONS given include: rest, increase oral fluid intake, and restart flonase or nasacort nasal spray.  follow up if not improving..            Prescriptions:       Augmentin (Amoxicillin/Clavulanate) 875mg/125mg Tablet 1 po bid with food  #14 (Fourteen) tablet(s) Refills: 0          Cough         codeine cough medication helps best.  expereinced itching with tessalon perles.  use sparingly.            Prescriptions:       Refill of: Codeine/Guaifenesin 10mg/100mg per 5ml Oral Liquid 1 tsp at bedtime, sugar free  #4 (Four) oz Refills: 0             Patient Recommendations:        For  Acute maxillary sinusitis:     Get plenty of rest. Increase oral fluid intake.              CHARGE CAPTURE           **Please note: ICD descriptions below are intended for billing purposes only and may not represent clinical diagnoses**        Primary Diagnosis:         461.0 Acute maxillary sinusitis            J01.00    Acute maxillary sinusitis, unspecified              Orders:          [id number]    Office/outpatient visit; established patient, level 3  (In-House)           786.2 Cough            R05    Cough

## 2021-05-18 NOTE — PROGRESS NOTES
"Guillermina Gardner  1955     Office/Outpatient Visit    Visit Date: Tue, Dec 8, 2020 11:46 am    Provider: Cecile Dennis N.P. (Assistant: Marcelle Pope MA)    Location: [facility]        Electronically signed by Cecile Dennis N.P. on  2020 09:02:37 PM                             Subjective:        CC: Mrs. Gardner is a 65 year old White female.  This is a follow-up visit.  COVID testing done earlier today, test pending / she is in pfizer study at [facility]        HPI:           Patient to be evaluated for type 2 diabetes mellitus without complications.  Current meds include an oral hypoglycemic ( Glucophage XR ( 2000 mg ) and Januvia ( 100 mg QD ) ).  She reports home blood glucose readings have averaged fasting readings in the 134-194 mg/dL range.  Most recent lab results include Weight (lb):  201.6 (10/08/2019), Hemoglobin A1c:  6.5 (%) (2020), LDL:  133 (mg/dL) (2020), HDL:  66 (mg/dL) (2020), Triglycerides:  97 (mg/dL) (2020), Microalbuminuria:  12.3 (mg/g creat) (2020).  She is following her diet and trying to exercise.  Very worried about her elevated glucose levels.      ROS:     CONSTITUTIONAL:  Negative for fever.      E/N/T:  Positive for sore throat ( acute; 4 days ) and PND.      CARDIOVASCULAR:  Negative for chest pain, palpitations, tachycardia, orthopnea, and edema.      RESPIRATORY:  Negative for recent cough and dyspnea.      GENITOURINARY:  Positive for vaginal itching (has a yeast inf).      NEUROLOGICAL:  Positive for headaches ( \"sinus\"; migraine; 6 weeks / takes imitrex/ zyrtec ).      PSYCHIATRIC:  Positive for feelings of stress.          Past Medical History / Family History / Social History:         Last Reviewed on 2020 12:01 PM by Cecile Dennis    Past Medical History:                 PAST MEDICAL HISTORY         Dizziness - Seeing Dr. Malik         GYNECOLOGICAL HISTORY:             PREVENTIVE HEALTH " MAINTENANCE             Hepatitis C Medicare Screening: was last done 3-17-18; negative     MAMMOGRAM: Done within last 2 years and results in are chart was last done 19 stable         Surgical History:         : X 2;     Lap Band ; Procedures: Ablation     Positive for    thumb joint left replaced/repaired 8-6-15; and    right thumb replaced 18;;     COLONOSCOPY: was last done 2016 with normal results Dr Rosa Forte         Family History:     Father:  at age 89;  Type 2 Diabetes; Hypothyroidism;  Macular degeneration     Mother:  at age 90;  Hypertension;  Dementia     Sister(s): Hypertension; Endocrine Hypothyroidism         Social History:     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          Immunizations:     influenza, high-dose, quadrivalent (FLUZONE HIGH-DOSE QUAD ) 2020    Hep A, adult dose 2018    Hep A, adult dose 12/10/2018    zzFluzone pf-quadrivalent 3 and up 10/7/2016    zzFluzone pf-quadrivalent 3 and up 2017    Fluzone (3 + years dose) 10/18/2006    Fluzone (3 + years dose) 2007    Fluzone (3 + years dose) 10/2/2008    Fluzone (3 + years dose) 2009    Fluzone (3 + years dose) 2010    Fluzone (3 + years dose) 10/17/2011    Fluzone (3 + years dose) 2012    Fluzone (3 + years dose) 10/7/2014    Fluzone pf (3+ years dose) 2013    Fluzone pf (3+ years dose) 2015    Fluzone Quadrivalent (3+ years) 2018    Fluzone Quadrivalent (3+ years) 10/8/2019    Influenza A (H1N1) pf, IM (3+ years) Monovalent 2009    Adacel (Tdap) 2010    Boostrix (Tdap) 2018        Allergies:     Last Reviewed on 2020 11:51 AM by Marcelle Pope  Perles: itching  (Adverse Reaction)    Aldomet:          Current Medications:     Last Reviewed on 12/08/2020 11:51 AM by Marcelle Pope    metFORMIN 500 mg oral tablet [TAKE 2 TABLETS TWICE A DAY]    lisinopriL 10 mg oral tablet [TAKE 1 TABLET DAILY]    atenoloL 25 mg oral tablet [TAKE 1 TABLET DAILY]    hydroCHLOROthiazide 25 mg oral tablet [TAKE 1/2 TO 1 TABLET DAILY]    traZODone 50 mg oral tablet [1 - 2  @ HS prn]    eye vitamin     Mucinex 600 mg oral Tablet,Extended Release 12 hr [PRN]    DULoxetine 60 mg oral capsule,delayed release (enteric coated) [TAKE 2 CAPSULES EVERY DAY]    estradioL 10 mcg Vaginal tablet [INSERT 1 TABLET VAGINALLY  EVERY OTHER DAY]    buPROPion  mg oral Tablet, Extended Release 24 hr [TAKE 1 TABLET DAILY]    Vitamin D3 1,000 unit oral capsule [Take 1 capsule(s) by mouth daily]    Januvia 100 mg oral tablet [take 1 tablet (100 mg) by oral route once daily]    ProAir HFA 90mcg/1actuation Oral Inhaler [1-2 puffs qid prn]    tiZANidine 4 mg oral capsule [Take 1 capsule by mouth every 12 hours as needed for muscle spasms]    cetirizine 10 mg oral capsule [Take 1 capsule(s) by mouth daily]    Lancet   Lancet [Check blood once daily as directed [facility] One Touch Ultra]    Xiidra 5 % ophthalmic (eye) Dropperette [Instill 1 drop(s) to each eye q12h]    Synthroid 112 mcg oral tablet [TAKE 1 TABLET BY MOUTH EVERY DAY]    Blood Glucose Test strips  [Check blood sugar one to two times daily. Accu chek soft view  Dx: E11.9]    lancets  [Check blood sugar one to two times daily.  One touch delica plus 33g.  DX: E11.9]    Crestor 10 mg oral tablet [take 1 tablet (10 mg) by oral route once daily]    SUMAtriptan succinate 50 mg oral tablet [take 1 tablet (50 mg) by oral route after onset of migraine; may repeat after 2 hours if headache returns, not to exceed 200mg in 24hrs]        Objective:        Vitals:         Current: 12/8/2020 11:53:48 AM    Ht:  5 ft, 4.25 in;  Wt: 209.2 lbs;  BMI:  35.6T: 97 F (temporal);  BP: 105/69 mm Hg (right arm, sitting);  P: 79 bpm (right arm (BP Cuff), sitting);  sCr: 1.03 mg/dL;  GFR: 63.42        Exams:     PHYSICAL EXAM:     GENERAL: vital signs recorded - well developed, well nourished;  no apparent distress;     E/N/T: EARS:  normal external auditory canals and tympanic membranes;  grossly normal hearing; NOSE: no sinus tenderness; OROPHARYNX: posterior pharynx shows clear;     NECK: carotid exam reveals no bruits;     RESPIRATORY: normal respiratory rate and pattern with no distress; normal breath sounds with no rales, rhonchi, wheezes or rubs;     CARDIOVASCULAR: normal rate; rhythm is regular;  no systolic murmur; no edema;     NEUROLOGIC: GROSSLY INTACT     PSYCHIATRIC:  appropriate affect and demeanor; normal speech pattern; grossly normal memory;         Assessment:         E11.9   Type 2 diabetes mellitus without complications       B37.3   Candidiasis of vulva and vagina       J06.9   Acute upper respiratory infection, unspecified           ORDERS:         Meds Prescribed:       [New Rx] Diflucan 150 mg oral tablet [take 1 tablet (150 mg) by oral route once if needed can repeat in one week ], #2 (two) tablets, Refills: 0 (zero)         Lab Orders:       [id number]  [facility] Basic Metabolic Panel  (Send-Out)            FUTURE  Future order to be done at patients convenience  (Send-Out)            [id number]  [address] CMP A1C LIPID AND MICRO ALBUM CR RATIO: [id number]  (Send-Out)                      Plan:         Type 2 diabetes mellitus without complicationsreviewed diabetes flow sheet; consider trulicity, will check labs around 12-30-20/renal function was off on last lab, rechecking today, reviewed glucose log she brought with her today    LABORATORY:  Labs ordered to be performed today include basic metabolic panel.      FOLLOW-UP TESTING #1: FOLLOW-UP LABORATORY:  Labs to be scheduled in the future include Diabetes Panel 2;CMP, A1C, Lipid,  Microalbumin:Creatinine Ratio.            Orders:       [id number]  [facility] Basic Metabolic Panel  (Send-Out)            FUTURE  Future order to be done at patients convenience  (Send-Out)            [id number]  DIAB - [facility] CMP A1C LIPID AND MICRO ALBUM CR RATIO: [id number]  (Send-Out)              Candidiasis of vulva and vagina    FOLLOW-UP: Advised to call if there is no improvement in 1 week(s).            Prescriptions:       [New Rx] Diflucan 150 mg oral tablet [take 1 tablet (150 mg) by oral route once if needed can repeat in one week ], #2 (two) tablets, Refills: 0 (zero)         Acute upper respiratory infection, unspecifiedCOVID test pending from [facility] study         RECOMMENDATIONS given include: rest and increase oral fluid intake.      FOLLOW-UP: Advised to call if there is no improvement in 4 day(s).              Patient Recommendations:        For  Type 2 diabetes mellitus without complications:            The following laboratory testing has been ordered:             Charge Capture:         Primary Diagnosis:     E11.9  Type 2 diabetes mellitus without complications           Orders:      [id number]  Office/outpatient visit; established patient, level 4  (In-House)              B37.3  Candidiasis of vulva and vagina     J06.9  Acute upper respiratory infection, unspecified

## 2021-05-18 NOTE — PROGRESS NOTES
Guillermina Gardner 1955     Office/Outpatient Visit    Visit Date: Thu, Mar 22, 2018 09:21 am    Provider: Cecile Dennis N.P. (Assistant: Dionne Mitchell MA)    Location: [facility]        Electronically signed by Cecile Dennis N.P. on  03/22/2018 01:41:59 PM                             SUBJECTIVE:        CC:     Ms. Gardner is a 63 year old White female.  This is a follow-up visit.  med refills;         HPI:         Ms. Gardner presents with hypercholesterolemia.  Current treatment includes red yeast rice.          In regard to the essential hypertension, her current cardiac medication regimen includes a diuretic ( HCTZ ), a beta-blocker ( Inderal ), and an ACE inhibitor ( Prinivil ).  She is tolerating the medication well without side effects.  Compliance with treatment has been good; she takes her medication as directed.          Additionally, she presents with history of acquired hypothyroidism.  she is currently taking Synthroid, 112 mcg daily.  TSH was last checked <1 weeks ago.          Type 2 diabetes details; current meds include an oral hypoglycemic ( Glucophage ( 1000mg bid ) and januvia ).  She reports home blood glucose readings have averaged fasting readings in the 102-150 mg/dL range.  Most recent lab results include Hemoglobin A1c:  5.9 (%) (03/17/2018), LDL:  126 (mg/dL) (03/17/2018), HDL:  64 (mg/dL) (03/17/2018), Triglycerides:  101 (mg/dL) (03/17/2018), Microalbuminuria:  23.4 (mg/g creat) (03/17/2018), Dilated Eye Exam by date:  08/11/2017 (06/24/2017), Foot Exam (Annual):  01/04/2017 (01/04/2017).      ROS:     CONSTITUTIONAL:  Negative for chills, fatigue, fever, and weight change.      CARDIOVASCULAR:  Negative for chest pain, palpitations, tachycardia, orthopnea, and edema.      RESPIRATORY:  Negative for cough, dyspnea, and hemoptysis.      NEUROLOGICAL:  Negative for dizziness, headaches, paresthesias, and weakness.      PSYCHIATRIC:  Positive for needs her  wellbutrin cymbalta and trazodone refilled, will retire later this year.          PMH/FMH/SH:     Last Reviewed on 3/22/2018 01:32 PM by Cecile Dennis    Past Medical History:                 PAST MEDICAL HISTORY         Dizziness - Seeing Dr. Malik         GYNECOLOGICAL HISTORY:             PREVENTIVE HEALTH MAINTENANCE             Hepatitis C Medicare Screening: was last done 3-17-18; negative     MAMMOGRAM: was last done 2017 stable         Surgical History:         : X 2;      Lap Band ; Procedures: Ablation     Positive for    thumb joint left replaced/repaired 8-6-15;;     COLONOSCOPY: was last done 2016 with normal results Dr Rosa Forte         Family History:     Father:  at age 89;  Type 2 Diabetes; Hypothyroidism;  Macular degeneration     Mother: ;  Hypertension;  Dementia     Sister(s): Hypertension; Endocrine Hypothyroidism         Social History:     Occupation: Nurse.   [employer]     Marital Status:      Children: 2 children         Tobacco/Alcohol/Supplements:     Last Reviewed on 3/22/2018 09:24 AM by Dionne Mitchell    Tobacco: She has a past history of cigarette smoking; quit date:  .              Immunizations:     zzFluzone pf-quadrivalent 3 and up 10/7/2016     zzFluzone pf-quadrivalent 3 and up 2017     Fluzone (3 + years dose) 10/18/2006     Fluzone (3 + years dose) 2007     Fluzone (3 + years dose) 10/2/2008     Fluzone (3 + years dose) 2009     Fluzone (3 + years dose) 2010     Fluzone (3 + years dose) 10/17/2011     Fluzone (3 + years dose) 2012     Fluzone (3 + years dose) 10/7/2014     Fluzone pf (3+ years dose) 2013     Fluzone pf (3+ years dose) 2015     Influenza A (H1N1) pf, IM (3+ years) Monovalent 2009     Adacel (Tdap) 2010         Allergies:     Last Reviewed on 3/22/2018 09:24 AM by Dionne Mitchell    Aldomet:        Current Medications:     Last  Reviewed on 3/22/2018 09:26 AM by Dionne Mitchell    Glucophage 500mg Tablet Take 2 tablet(s) by mouth bid     ProAir HFA 90mcg/1actuation Oral Inhaler 1-2 puffs qid prn     Vagifem 10mcg Vaginal Tablets insert qod     Synthroid 0.112mg Tablet One PO QD   DNS     Cymbalta 60mg Capsules, Delayed Release Take 2 capsule(s) by mouth  QD     Hydrochlorothiazide (HCTZ) 25mg Tablet 1/2 to 1 pill daily     Prinivil 10mg Tablet Take 1 tablet(s) by mouth daily     Glucose Reagent Blood Test Strips  Reagent Strips Check blood sugar daily with One Touch Ultra     Lancet   Lancet Check blood once daily as directed wi One Touch Ultra     Trazodone HCl 50mg Tablet 1 - 2  @ HS prn     Wellbutrin XL 150mg Tablets, Extended Release 1 tab daily     Januvia 50mg Tablet 1 tab daily     Propranolol HCl 20mg Tablet 1 p.o. qhs     Xiidra 5% Ophthalmic Solution Instill 1 drop(s) to each eye q12h     Cholestoff 1800 daily     Cetirizine HCl 10mg Capsules Take 1 capsule(s) by mouth daily     Vitamin D3 1,000IU Capsules Take 1 capsule(s) by mouth daily     Mucinex 600mg Tablets, Extended Release PRN     eye vitamin         OBJECTIVE:        Vitals:         Current: 3/22/2018 9:24:02 AM    Ht:  5 ft, 4.25 in;  Wt: 207.9 lbs;  BMI: 35.4    T: 97.8 F (oral);  BP: 134/86 mm Hg (left arm, sitting);  P: 91 bpm (finger clip, sitting);  sCr: 0.74 mg/dL;  GFR: 90.28        Exams:     PHYSICAL EXAM:     GENERAL: vital signs recorded - well developed, well nourished;  no apparent distress;     NECK: carotid exam reveals no bruits;     RESPIRATORY: normal respiratory rate and pattern with no distress; normal breath sounds with no rales, rhonchi, wheezes or rubs;     CARDIOVASCULAR: normal rate; rhythm is regular;  no systolic murmur;    Peripheral Pulses: posterior tibial: equal bilaterally;  no edema;     BREAST/INTEGUMENT: skin of feet and toenails intact;     NEUROLOGIC: sensation intact to monofilament bilaterally;     PSYCHIATRIC:  appropriate  affect and demeanor; normal speech pattern; grossly normal memory;         ASSESSMENT           272.0   E78.5  Hypercholesterolemia              DDx:     401.1   I10  Essential hypertension              DDx:     244.8   E03.8  Acquired hypothyroidism              DDx:     250.00   E11.9  Type 2 diabetes              DDx:     300.4   F41.9  Anxiety with depression              DDx:         ORDERS:         Meds Prescribed:       Refill of: Synthroid (Levothyroxine Sodium) 0.112mg Tablet One PO QD   DNS  #90 (Ninety) tablet(s) Refills: 1       Refill of: Glucophage (Metformin HCl) 500mg Tablet Take 2 tablet(s) by mouth bid  #360 (Three [place] Sixty) tablet(s) Refills: 1       Refill of: Cymbalta (Duloxetine HCl) 60mg Capsules, Delayed Release Take 2 capsule(s) by mouth  QD  #180 (One [place] and Eighty) capsule(s) Refills: 1       Refill of: Hydrochlorothiazide (HCTZ) 25mg Tablet 1/2 to 1 pill daily  #90 (Ninety) tablet(s) Refills: 1       Refill of: Prinivil (Lisinopril) 10mg Tablet Take 1 tablet(s) by mouth daily  #90 (Ninety) tablet(s) Refills: 1       Refill of: Trazodone HCl 50mg Tablet 1 - 2  @ HS prn  #180 (One [place] [address] Eighty) tablet(s) Refills: 1       Refill of: Wellbutrin XL (Bupropion HCl) 150mg Tablets, Extended Release 1 tab daily  #90 (Ninety) tablet(s) Refills: 1       Refill of: Januvia (Sitagliptin Phosphate) 50mg Tablet 1 tab daily  #90 (Ninety) tablet(s) Refills: 1       Refill of: Propranolol HCl 20mg Tablet 1 p.o. qhs  #90 (Ninety) tablet(s) Refills: 1                 PLAN:          Hypercholesterolemia reviewed labs with patient, copy to patient, she is going to start taking cholestoff again instead of red yeast rice and see how she does         RECOMMENDATIONS given include: exercise and low cholesterol/low fat diet.           Essential hypertension         FOLLOW-UP: Schedule a follow-up visit in 6 months.            Prescriptions:       Refill of: Hydrochlorothiazide (HCTZ) 25mg Tablet  1/2 to 1 pill daily  #90 (Ninety) tablet(s) Refills: 1       Refill of: Prinivil (Lisinopril) 10mg Tablet Take 1 tablet(s) by mouth daily  #90 (Ninety) tablet(s) Refills: 1       Refill of: Propranolol HCl 20mg Tablet 1 p.o. qhs  #90 (Ninety) tablet(s) Refills: 1          Acquired hypothyroidism           Prescriptions:       Refill of: Synthroid (Levothyroxine Sodium) 0.112mg Tablet One PO QD   DNS  #90 (Ninety) tablet(s) Refills: 1          Type 2 diabetes she is using raman, works, but the new I-phone will not be compatible with a new phone, I will reach out to diabetes educator to see if they have a recommendation; updated diabetes flow sheet           Prescriptions:       Refill of: Glucophage (Metformin HCl) 500mg Tablet Take 2 tablet(s) by mouth bid  #360 ([address] [place] and Sixty) tablet(s) Refills: 1       Refill of: Januvia (Sitagliptin Phosphate) 50mg Tablet 1 tab daily  #90 (Ninety) tablet(s) Refills: 1          Anxiety with depression           Prescriptions:       Refill of: Cymbalta (Duloxetine HCl) 60mg Capsules, Delayed Release Take 2 capsule(s) by mouth  QD  #180 (One [place] and Eighty) capsule(s) Refills: 1       Refill of: Wellbutrin XL (Bupropion HCl) 150mg Tablets, Extended Release 1 tab daily  #90 (Ninety) tablet(s) Refills: 1       Refill of: Trazodone HCl 50mg Tablet 1 - 2  @ HS prn  #180 (One [place] and Eighty) tablet(s) Refills: 1             Patient Recommendations:        For  Hypercholesterolemia:     Maintain a regular exercise program. Reduce the amount of cholesterol and saturated fat in your diet.          For  Essential hypertension:     Schedule a follow-up visit in 6 months.              CHARGE CAPTURE           **Please note: ICD descriptions below are intended for billing purposes only and may not represent clinical diagnoses**        Primary Diagnosis:         272.0 Hypercholesterolemia            E78.5    Hyperlipidemia, unspecified              Orders:          39121    Office/outpatient visit; established patient, level 4  (In-House)           401.1 Essential hypertension            I10    Essential (primary) hypertension    244.8 Acquired hypothyroidism            E03.8    Other specified hypothyroidism    250.00 Type 2 diabetes            E11.9    Type 2 diabetes mellitus without complications    300.4 Anxiety with depression            F41.9    Anxiety disorder, unspecified

## 2021-05-18 NOTE — PROGRESS NOTES
Guillermina Gardner 1955     Office/Outpatient Visit    Visit Date:  02:30 pm    Provider: Prema Miller N.P. (Assistant: Savi Castle MA)    Location: [facility]        Electronically signed by Prema Miller N.P. on  2019 03:12:53 PM                             SUBJECTIVE:        CC:     Mrs. Gardner is a 64 year old White female.  She presents with cough, congestion x 1 wk.          HPI:         Upper respiratory illness noted.  These have been present for the past one week.  The symptoms include mostly dry, scant productive at time cough, sinus pain/pressure and chest congestion.  She has already tried to relieve the symptoms with Mucinex, cough medication that was prescribed by PCP codeine/ivon.      ROS:     CONSTITUTIONAL:  Negative for chills and fever.      EYES:  Negative for eye drainage and eye pain.      E/N/T:  Positive for sinus pressure.   Negative for sore throat.      CARDIOVASCULAR:  Negative for chest pain and palpitations.      RESPIRATORY:  Positive for recent cough.   Negative for dyspnea or frequent wheezing.          [facility]/FM/:     Last Reviewed on 12/10/2018 09:40 AM by Cecile Dennis    Past Medical History:                 PAST MEDICAL HISTORY         Dizziness - Seeing Dr. Malik         GYNECOLOGICAL HISTORY:             PREVENTIVE HEALTH MAINTENANCE             Hepatitis C Medicare Screening: was last done 3-17-18; negative     MAMMOGRAM: Done within last 2 years and results in are chart was last done 2018 stable         Surgical History:         : X 2;      Lap Band ; Procedures: Ablation     Positive for    thumb joint left replaced/repaired 8-6-15; and    right thumb replaced 18;;     COLONOSCOPY: was last done 2016 with normal results Dr Rosa Forte         Family History:     Father:  at age 89;  Type 2 Diabetes; Hypothyroidism;  Macular degeneration     Mother:  at age 90;   Hypertension;  Dementia     Sister(s): Hypertension; Endocrine Hypothyroidism         Social History:     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              Current Problems:     Hip pain     Fever, unspecified     Patient visit for long term (current) drug use; other     Dizziness     Joint pain     Low back pain     Foot pain     Fatigue     Essential hypertension     Acquired hypothyroidism     Hypercholesterolemia     Type 2 diabetes     Thumb pain     Anxiety with depression     Headache         Immunizations:     Hep A, adult dose 5/11/2018     Hep A, adult dose 12/10/2018     zzFluzone pf-quadrivalent 3 and up 10/7/2016     zzFluzone pf-quadrivalent 3 and up 9/21/2017     Fluzone (3 + years dose) 10/18/2006     Fluzone (3 + years dose) 11/12/2007     Fluzone (3 + years dose) 10/2/2008     Fluzone (3 + years dose) 9/22/2009     Fluzone (3 + years dose) 9/16/2010     Fluzone (3 + years dose) 10/17/2011     Fluzone (3 + years dose) 11/6/2012     Fluzone (3 + years dose) 10/7/2014     Fluzone pf (3+ years dose) 11/13/2013     Fluzone pf (3+ years dose) 9/28/2015     Fluzone Quadrivalent (3+ years) 9/26/2018     Influenza A (H1N1) pf, IM (3+ years) Monovalent 11/11/2009     Adacel (Tdap) 9/16/2010     Boostrix (Tdap) 5/11/2018         Allergies:     Last Reviewed on 2/13/2019 02:36 PM by Savi Castle    Aldomet:        Current Medications:     Last Reviewed on 2/13/2019 02:36 PM by Savi Castle    Vagifem 10mcg Vaginal Tablets insert qod     Atenolol 25mg Tablet Take 1 tablet(s) by mouth daily     Cymbalta 60mg Capsules, Delayed Release Take 2 capsule(s) by mouth  QD     Glucophage 500mg Tablet Take 2 tablet(s) by mouth bid     Hydrochlorothiazide  (HCTZ) 25mg Tablet 1/2 to 1 pill daily     Januvia 50mg Tablet Take 1 tablet(s) by mouth daily     Prinivil 10mg Tablet Take 1 tablet(s) by mouth daily     Synthroid 0.125mg Tablet One PO QD  DNS     Trazodone HCl 50mg Tablet 1 - 2  @ HS prn     ProAir HFA 90mcg/1actuation Oral Inhaler 1-2 puffs qid prn     Glucose Reagent Blood Test Strips  Reagent Strips Check blood sugar daily with One Touch Ultra     Lancet   Lancet Check blood once daily as directed [facility] One Touch Ultra     Codeine/Guaifenesin 10mg/100mg per 5ml Oral Liquid 1 tsp at bedtime, sugar free     Xiidra 5% Ophthalmic Solution Instill 1 drop(s) to each eye q12h     Cholestoff 1800 daily     Cetirizine HCl 10mg Capsules Take 1 capsule(s) by mouth daily     Vitamin D3 1,000IU Capsules Take 1 capsule(s) by mouth daily     Mucinex 600mg Tablets, Extended Release PRN     eye vitamin         OBJECTIVE:        Vitals:         Current: 2/13/2019 2:41:18 PM    Ht:  5 ft, 4.25 in;  Wt: 203 lbs;  BMI: 34.6    T: 99 F (oral);  BP: 118/64 mm Hg (right arm, sitting);  P: 98 bpm (finger clip, sitting);  sCr: 0.92 mg/dL;  GFR: 70.99        Exams:     PHYSICAL EXAM:     GENERAL: well developed, well nourished;  no apparent distress;     EYES: PERRL, EOMI     E/N/T: EARS: external auditory canal normal;  both TMs are dull;  NOSE: normal turbinates; bilateral maxillary and bilateral frontal sinus tenderness present; OROPHARYNX: oral mucosa is normal; posterior pharynx shows no exudate and post nasal drip;     NECK: range of motion is normal; trachea is midline;     RESPIRATORY: normal respiratory rate and pattern with no distress; normal breath sounds with no rales, rhonchi, wheezes or rubs;     CARDIOVASCULAR: normal rate; rhythm is regular;     MUSCULOSKELETAL: normal gait;     NEUROLOGIC: mental status: alert and oriented x 3; GROSSLY INTACT     PSYCHIATRIC: appropriate affect and demeanor;         ASSESSMENT           461.8   J01.90  Acute sinusitis, other               DDx:         ORDERS:         Meds Prescribed:       Refill of: ProAir HFA (Albuterol) 90mcg/1actuation Oral Inhaler 1-2 puffs qid prn  #8.5 ([place]) gm Refills: 0       Doxycycline Monohydrate 100mg Capsules Take 1 capsule(s) by mouth bid x10 days  #20 (Twenty) capsule(s) Refills: 0                 PLAN:          Acute sinusitis, other Cough medication was prescribed by PCP. May continue Mucinex per package instructions.         RECOMMENDATIONS given include: Push Fluids, Rest, Follow up if no improvement or worsening symptoms like high fevers, vomiting, weakness, or increasing shortness of air.    .      FOLLOW-UP: Schedule follow-up appointments on a p.r.n. basis. Chronic visit follow up           Prescriptions:       Refill of: ProAir HFA (Albuterol) 90mcg/1actuation Oral Inhaler 1-2 puffs qid prn  #8.5 ([place]) gm Refills: 0       Doxycycline Monohydrate 100mg Capsules Take 1 capsule(s) by mouth bid x10 days  #20 (Twenty) capsule(s) Refills: 0             Patient Recommendations:        For  Acute sinusitis, other:     Schedule follow-up appointments as needed.              CHARGE CAPTURE           **Please note: ICD descriptions below are intended for billing purposes only and may not represent clinical diagnoses**        Primary Diagnosis:         461.8 Acute sinusitis, other            J01.90    Acute sinusitis, unspecified              Orders:          12313   Office/outpatient visit; established patient, level 3  (In-House)

## 2021-05-18 NOTE — PROGRESS NOTES
Guillermina Gardner 1955     Office/Outpatient Visit    Visit Date:  10:30 am    Provider: Cecile Dennis N.P. (Assistant: Dionne Mitchell MA)    Location: [facility]        Electronically signed by Cecile Dennis N.P. on  2018 11:33:05 AM                             SUBJECTIVE:        CC:     Ms. Gardner is a 63 year old White female.  This is a follow-up visit.  wants referral for vestibular rehab for right ear;         HPI:         Ms. Gardner presents with otalgia.      Ms. Gardner complains of right ear pain.  Associated symptoms include dizziness and nausea.  The symptoms began 4 weeks ago.  She has already tried to relieve the symptoms with meclizine.      ROS:     CONSTITUTIONAL:  Negative for fever.      E/N/T:  Negative for diminished hearing.      CARDIOVASCULAR:  Negative for chest pain, palpitations, tachycardia, orthopnea, and edema.      RESPIRATORY:  Negative for cough, dyspnea, and hemoptysis.          PMH/FMH/SH:     Last Reviewed on 2018 11:31 AM by Cecile Dennis    Past Medical History:                 PAST MEDICAL HISTORY         Dizziness - Seeing Dr. Malik         GYNECOLOGICAL HISTORY:             PREVENTIVE HEALTH MAINTENANCE             Hepatitis C Medicare Screening: was last done 3-17-18; negative     MAMMOGRAM: was last done 2017 stable         Surgical History:         : X 2;      Lap Band ; Procedures: Ablation     Positive for    thumb joint left replaced/repaired 8-6-15;;     COLONOSCOPY: was last done 2016 with normal results Dr Rosa Forte         Family History:     Father:  at age 89;  Type 2 Diabetes; Hypothyroidism;  Macular degeneration     Mother: ;  Hypertension;  Dementia     Sister(s): Hypertension; Endocrine Hypothyroidism         Social History:     Occupation: Nurse.   [place]     Marital Status:      Children: 2 children          Tobacco/Alcohol/Supplements:     Last Reviewed on 4/30/2018 10:33 AM by Dionne Mitchell    Tobacco: She has a past history of cigarette smoking; quit date:  1980.              Immunizations:     zzFluzone pf-quadrivalent 3 and up 10/7/2016     zzFluzone pf-quadrivalent 3 and up 9/21/2017     Fluzone (3 + years dose) 10/18/2006     Fluzone (3 + years dose) 11/12/2007     Fluzone (3 + years dose) 10/2/2008     Fluzone (3 + years dose) 9/22/2009     Fluzone (3 + years dose) 9/16/2010     Fluzone (3 + years dose) 10/17/2011     Fluzone (3 + years dose) 11/6/2012     Fluzone (3 + years dose) 10/7/2014     Fluzone pf (3+ years dose) 11/13/2013     Fluzone pf (3+ years dose) 9/28/2015     Influenza A (H1N1) pf, IM (3+ years) Monovalent 11/11/2009     Adacel (Tdap) 9/16/2010         Allergies:     Last Reviewed on 4/30/2018 10:33 AM by Dionne Mitchell    Aldomet:        Current Medications:     Last Reviewed on 4/30/2018 10:35 AM by Dionne Mitchell    Cymbalta 60mg Capsules, Delayed Release Take 2 capsule(s) by mouth  QD     Glucophage 500mg Tablet Take 2 tablet(s) by mouth bid     Hydrochlorothiazide (HCTZ) 25mg Tablet 1/2 to 1 pill daily     Prinivil 10mg Tablet Take 1 tablet(s) by mouth daily     Synthroid 0.112mg Tablet One PO QD   DNS     Trazodone HCl 50mg Tablet 1 - 2  @ HS prn     ProAir HFA 90mcg/1actuation Oral Inhaler 1-2 puffs qid prn     Vagifem 10mcg Vaginal Tablets insert qod     Glucose Reagent Blood Test Strips  Reagent Strips Check blood sugar daily with One Touch Ultra     Lancet   Lancet Check blood once daily as directed wi One Touch Ultra     Wellbutrin XL 300mg Tablets, Extended Release 1 tab daily     Januvia 50mg Tablet 1 tab daily     Propranolol HCl 20mg Tablet 1 p.o. qhs     Xiidra 5% Ophthalmic Solution Instill 1 drop(s) to each eye q12h     Cholestoff 1800 daily     Cetirizine HCl 10mg Capsules Take 1 capsule(s) by mouth daily     Vitamin D3 1,000IU Capsules Take 1 capsule(s)  by mouth daily     Mucinex 600mg Tablets, Extended Release PRN     eye vitamin         OBJECTIVE:        Vitals:         Current: 4/30/2018 10:33:18 AM    Ht:  5 ft, 4.25 in;  Wt: 211.3 lbs;  BMI: 36.0    T: 98.7 F (oral);  BP: 124/82 mm Hg (left arm, sitting);  P: 81 bpm (finger clip, sitting);  sCr: 0.74 mg/dL;  GFR: 90.91        Exams:     PHYSICAL EXAM:     GENERAL: vital signs recorded - well developed, well nourished;  no apparent distress;     E/N/T: EARS:  normal external auditory canals and tympanic membranes;  grossly normal hearing;     NECK: carotid exam reveals no bruits;     RESPIRATORY: normal respiratory rate and pattern with no distress; normal breath sounds with no rales, rhonchi, wheezes or rubs;     CARDIOVASCULAR: normal rate; rhythm is regular;  no systolic murmur;     NEUROLOGIC: GROSSLY INTACT     PSYCHIATRIC:  appropriate affect and demeanor; normal speech pattern; grossly normal memory;         ASSESSMENT           388.70   H92.09  Otalgia              DDx:         ORDERS:         Procedures Ordered:       REFER  Referral to Specialist or Other Facility  (Send-Out)                   PLAN:          Otalgia PT works/vestibular trainingDiana /order given to patient she will schedule her own appt         REFERRALS:  Referral initiated to physical therapy ( [facility]; for evaluation of vestibular therapy ).      FOLLOW-UP: Advised to call if there is no improvement in 2-3 week(s).            Orders:       REFER  Referral to Specialist or Other Facility  (Send-Out)               CHARGE CAPTURE           **Please note: ICD descriptions below are intended for billing purposes only and may not represent clinical diagnoses**        Primary Diagnosis:         388.70 Otalgia            H92.09    Otalgia, unspecified ear              Orders:          [id number]   Office/outpatient visit; established patient, level 3  (In-House)

## 2021-05-18 NOTE — PROGRESS NOTES
Guillermina Gardner  1955     Office/Outpatient Visit    Visit Date: Wed, May 6, 2020 08:30 am    Provider: Cecile Dennis N.P. (Assistant: Dionne Mitchell MA)    Location: [facility]        Electronically signed by Cecile Dennis N.P. on  05/06/2020 09:51:43 AM                             Subjective:        CC: Mrs. Gardner is a 65 year old White female.  This is a follow-up visit.  med refills;         HPI:  TELEMEDICINE VISIT:    - Guillermina consented to this telemedicine visit.    - Persons present during the telemedicine consultation include: Guillermina - patient, JEFFREY Dennis APRN    - This visit is being conducted over FaceTime with audio and video.              Mrs. Gardner presents with other specified hypothyroidism.  She is currently taking Synthroid, 112 mcg daily.  TSH was last checked 3 months ago.  The result was reported as normal.  She denies any related symptoms.            Concerning type 2 diabetes mellitus without complications, current meds include an oral hypoglycemic ( Glucophage XR and Januvia ).  Most recent lab results include Hemoglobin A1c:  6.9 (%) (12/12/2019), LDL:  123 (mg/dL) (12/12/2019), HDL:  59 (mg/dL) (12/12/2019), Triglycerides:  106 (mg/dL) (12/12/2019), Microalbuminuria:  10.5 (mg/g creat) (06/17/2019), Dilated Eye Exam by date:  11/06/2019 (10/08/2019), Foot Exam (Annual):  06/17/2019 (06/17/2019).            Concerning essential (primary) hypertension, her current cardiac medication regimen includes a diuretic ( Hydrochlorothiazide (HCTZ) ), a beta-blocker ( Tenormin ), and an ACE inhibitor.  Mrs. Gardner does not check her blood pressure other than at her clinic appointments.  She is tolerating the medication well without side effects.  Compliance with treatment has been good; she takes her medication as directed.            With regard to the mixed hyperlipidemia, current treatment includes a low cholesterol/low fat diet.  did not tolerate  colestoff           Dx with anxiety disorder, unspecified; current treatment includes Cymbalta and Wellbutrin XL.  Has seen Thanh Weinstein and can follow up when needed.  Doing well on current rx's.      ROS:     CONSTITUTIONAL:  Negative for fever.  exercising regularly     E/N/T:  Positive for had a root canal last  week.      CARDIOVASCULAR:  Negative for chest pain, palpitations, tachycardia, orthopnea, and edema.      RESPIRATORY:  Negative for recent cough and dyspnea.      NEUROLOGICAL:  Positive for dizziness ( intermittent issues ).          Past Medical History / Family History / Social History:         Last Reviewed on 2020 09:05 AM by Cecile Dennis    Past Medical History:                 PAST MEDICAL HISTORY         Dizziness - Seeing Dr. Malik         GYNECOLOGICAL HISTORY:             PREVENTIVE HEALTH MAINTENANCE             Hepatitis C Medicare Screening: was last done 3-17-18; negative     MAMMOGRAM: Done within last 2 years and results in are chart was last done 19 stable         Surgical History:         : X 2;     Lap Band ; Procedures: Ablation     Positive for    thumb joint left replaced/repaired 8-6-15; and    right thumb replaced 18;;     COLONOSCOPY: was last done 2016 with normal results Dr Rosa Forte         Family History:     Father:  at age 89;  Type 2 Diabetes; Hypothyroidism;  Macular degeneration     Mother:  at age 90;  Hypertension;  Dementia     Sister(s): Hypertension; Endocrine Hypothyroidism         Social History:     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          Immunizations:     Hep A, adult dose 2018    Hep A, adult dose 12/10/2018    zzFluzone  pf-quadrivalent 3 and up 10/7/2016    zzFluzone pf-quadrivalent 3 and up 9/21/2017    Fluzone (3 + years dose) 10/18/2006    Fluzone (3 + years dose) 11/12/2007    Fluzone (3 + years dose) 10/2/2008    Fluzone (3 + years dose) 9/22/2009    Fluzone (3 + years dose) 9/16/2010    Fluzone (3 + years dose) 10/17/2011    Fluzone (3 + years dose) 11/6/2012    Fluzone (3 + years dose) 10/7/2014    Fluzone pf (3+ years dose) 11/13/2013    Fluzone pf (3+ years dose) 9/28/2015    Fluzone Quadrivalent (3+ years) 9/26/2018    Fluzone Quadrivalent (3+ years) 10/8/2019    Influenza A (H1N1) pf, IM (3+ years) Monovalent 11/11/2009    Adacel (Tdap) 9/16/2010    Boostrix (Tdap) 5/11/2018        Allergies:     Last Reviewed on 5/06/2020 08:31 AM by Dionne Mitchell Perles: itching  (Adverse Reaction)    Aldomet:          Current Medications:     Last Reviewed on 5/06/2020 08:33 AM by Dionne Mitchell    lisinopriL 10 mg oral tablet [TAKE 1 TABLET DAILY]    metFORMIN 500 mg oral tablet [TAKE 2 TABLETS TWICE A DAY]    atenoloL 25 mg oral tablet [TAKE 1 TABLET DAILY]    hydroCHLOROthiazide 25 mg oral tablet [TAKE 1/2 TO 1 TABLET DAILY]    traZODone 50 mg oral tablet [1 - 2  @ HS prn]    eye vitamin     Mucinex 600 mg oral Tablet,Extended Release 12 hr [PRN]    DULoxetine 60 mg oral capsule,delayed release (enteric coated) [TAKE 2 CAPSULES EVERY DAY]    estradioL 10 mcg Vaginal tablet [INSERT VAGINALLY EVERY OTHER DAY]    Blood Glucose Test strips [Check blood sugar daily with One Touch Ultra.  Dx: E11.9]    Wellbutrin  mg oral Tablet, Extended Release 24 hr [1 Tab daily]    Vitamin D3 1,000 unit oral capsule [Take 1 capsule(s) by mouth daily]    Januvia 50 mg oral tablet [TAKE 1 TABLET DAILY]    ProAir HFA 90mcg/1actuation Oral Inhaler [1-2 puffs qid prn]    cetirizine 10 mg oral capsule [Take 1 capsule(s) by mouth daily]    Lancet   Lancet [Check blood once daily as directed [facility] One Touch Ultra]    Xiidra 5 %  ophthalmic (eye) Dropperette [Instill 1 drop(s) to each eye q12h]    Synthroid 112 mcg oral tablet [take 1 tablet (112 mcg) by oral route once daily DNS]        Objective:        Exams:     PHYSICAL EXAM:     GENERAL: vital signs recorded - well developed, well nourished;  no apparent distress;     RESPIRATORY: normal appearance and symmetric expansion of chest wall;     NEUROLOGIC: GROSSLY INTACT     PSYCHIATRIC:  appropriate affect and demeanor; normal speech pattern; grossly normal memory;         Assessment:         E03.8   Other specified hypothyroidism       E11.9   Type 2 diabetes mellitus without complications       I10   Essential (primary) hypertension       E78.2   Mixed hyperlipidemia       F41.9   Anxiety disorder, unspecified           ORDERS:         Meds Prescribed:       [Refilled] DULoxetine 60 mg oral capsule,delayed release (enteric coated) [TAKE 2 CAPSULES EVERY DAY], #180 (one hundred and eighty) capsules, Refills: 1 (one)       [Refilled] Januvia 50 mg oral tablet [TAKE 1 TABLET DAILY], #90 (ninety) tablets, Refills: 1 (one)       [Refilled] Synthroid 112 mcg oral tablet [take 1 tablet (112 mcg) by oral route once daily DNS], #90 (ninety) tablets, Refills: 0 (zero)       [Refilled] hydroCHLOROthiazide 25 mg oral tablet [TAKE 1/2 TO 1 TABLET DAILY], #90 (ninety) tablets, Refills: 0 (zero)       [Refilled] lisinopriL 10 mg oral tablet [TAKE 1 TABLET DAILY], #90 (ninety) tablets, Refills: 0 (zero)       [Refilled] atenoloL 25 mg oral tablet [TAKE 1 TABLET DAILY], #90 (ninety) tablets, Refills: 0 (zero)       [Refilled] metFORMIN 500 mg oral tablet [TAKE 2 TABLETS TWICE A DAY], #360 (three hundred and sixty) tablets, Refills: 0 (zero)         Lab Orders:       FUTURE  Future order to be done at patients convenience  (Send-Out)            [id number]  [address] CMP A1C LIPID AND MICRO ALBUM CR RATIO: [id number]  (Send-Out)            FUTURE  Future order to be done at patients  convenience  (Send-Out)            [id number]  [facility] TSH  (Send-Out)                      Plan:         Other specified hypothyroidism    Telehealth: Verbal consent obtained for visit to occur via televideo conferencing     FOLLOW-UP TESTING #1: FOLLOW-UP LABORATORY:  Labs to be scheduled in the future include TSH.            Prescriptions:       [Refilled] Synthroid 112 mcg oral tablet [take 1 tablet (112 mcg) by oral route once daily DNS], #90 (ninety) tablets, Refills: 0 (zero)           Orders:       FUTURE  Future order to be done at patients convenience  (Send-Out)            [id number]  [facility] TSH  (Send-Out)              Type 2 diabetes mellitus without complicationssend accucheck test strips and lancets to  her mail order pharmacy /reviewed diabetes flow sheet / has an appt next week with [facility]        FOLLOW-UP TESTING #1: FOLLOW-UP LABORATORY:  Labs to be scheduled in the future include Diabetes Panel 2;CMP, A1C, Lipid, Microalbumin:Creatinine Ratio.            Prescriptions:       [Refilled] Januvia 50 mg oral tablet [TAKE 1 TABLET DAILY], #90 (ninety) tablets, Refills: 1 (one)       [Refilled] metFORMIN 500 mg oral tablet [TAKE 2 TABLETS TWICE A DAY], #360 (three hundred and sixty) tablets, Refills: 0 (zero)           Orders:       FUTURE  Future order to be done at patients convenience  (Send-Out)            [id number]  [address] CMP A1C LIPID AND MICRO ALBUM CR RATIO: [id number]  (Send-Out)              Essential (primary) hypertension          Prescriptions:       [Refilled] hydroCHLOROthiazide 25 mg oral tablet [TAKE 1/2 TO 1 TABLET DAILY], #90 (ninety) tablets, Refills: 0 (zero)       [Refilled] lisinopriL 10 mg oral tablet [TAKE 1 TABLET DAILY], #90 (ninety) tablets, Refills: 0 (zero)       [Refilled] atenoloL 25 mg oral tablet [TAKE 1 TABLET DAILY], #90 (ninety) tablets, Refills: 0 (zero)         Mixed hyperlipidemia        RECOMMENDATIONS given include: exercise and low  cholesterol/low fat diet.      FOLLOW-UP: fasting for labs, mailing lab order to come in next month         Anxiety disorder, unspecifiedhas  trazodone for sleep takes prn, does not  refills            Prescriptions:       [Refilled] DULoxetine 60 mg oral capsule,delayed release (enteric coated) [TAKE 2 CAPSULES EVERY DAY], #180 (one hundred and eighty) capsules, Refills: 1 (one)             Patient Recommendations:        For  Other specified hypothyroidism:            The following laboratory testing has been ordered: TSH         For  Type 2 diabetes mellitus without complications:            The following laboratory testing has been ordered:         For  Mixed hyperlipidemia:    Maintain a regular exercise program. Reduce the amount of cholesterol and saturated fat in your diet.              Charge Capture:         Primary Diagnosis:     E03.8  Other specified hypothyroidism           Orders:      [id number]  Office/outpatient visit; established patient, level 4  (In-House)              E11.9  Type 2 diabetes mellitus without complications     I10  Essential (primary) hypertension     E78.2  Mixed hyperlipidemia     F41.9  Anxiety disorder, unspecified

## 2021-05-18 NOTE — PROGRESS NOTES
Guillermina Gardner 1955     Office/Outpatient Visit    Visit Date:  09:52 am    Provider: Cecile Dennis N.P. (Assistant: Debo Dennis MA)    Location: [facility]        Electronically signed by Cecile Dennis N.P. on  2018 11:23:18 AM                             SUBJECTIVE:        CC:     Ms. Gardner is a 63 year old White female.  Patient complains of persistent cough.;         HPI:         Patient to be evaluated for upper respiratory illness.  These have been present for the past >1 weeks.  The symptoms include dry cough, nasal congestion and purulent nasal discharge.  She denies exposure to ill contacts.  She has already tried to relieve the symptoms with prescription cough medication and inhaler.  Medical history is significant for diabetes.      ROS:     CONSTITUTIONAL:  Negative for fever.      E/N/T:  Positive for sore throat ( intermittent ) and PND.      CARDIOVASCULAR:  Negative for chest pain, palpitations, tachycardia, orthopnea, and edema.      RESPIRATORY:  Negative for frequent wheezing.      NEUROLOGICAL:  Positive for headaches ( since switching to a different beta blocker ).          PMH/FMH/SH:     Last Reviewed on 2018 10:10 AM by Cecile Dennis    Past Medical History:                 PAST MEDICAL HISTORY         Dizziness - Seeing Dr. Malik         GYNECOLOGICAL HISTORY:             PREVENTIVE HEALTH MAINTENANCE             MAMMOGRAM: was last done 2017 stable         Surgical History:         : X 2;      Lap Band ; Procedures: Ablation     Positive for    thumb joint left replaced/repaired 8-6-15;;     COLONOSCOPY: was last done 2016 with normal results Dr Rosa Forte         Family History:     Father:  at age 89;  Type 2 Diabetes; Hypothyroidism;  Macular degeneration     Mother: ;  Hypertension;  Dementia     Sister(s): Hypertension; Endocrine Hypothyroidism         Social History:      Occupation: Nurse.   [employer]     Marital Status:      Children: 2 children         Tobacco/Alcohol/Supplements:     Last Reviewed on 2/12/2018 09:59 AM by Debo Dennis    Tobacco: She has a past history of cigarette smoking; quit date:  1980.              Immunizations:     Fluzone pf-quadrivalent 3 and up 10/7/2016     Fluzone pf-quadrivalent 3 and up 9/21/2017     Fluzone (3 + years dose) 10/18/2006     Fluzone (3 + years dose) 11/12/2007     Fluzone (3 + years dose) 10/2/2008     Fluzone (3 + years dose) 9/22/2009     Fluzone (3 + years dose) 9/16/2010     Fluzone (3 + years dose) 10/17/2011     Fluzone (3 + years dose) 11/6/2012     Fluzone (3 + years dose) 10/7/2014     Fluzone pf (3+ years dose) 11/13/2013     Fluzone pf (3+ years dose) 9/28/2015     Influenza A (H1N1) pf, IM (3+ years) Monovalent 11/11/2009     Adacel (Tdap) 9/16/2010         Allergies:     Last Reviewed on 2/12/2018 09:52 AM by Debo Dennis    Aldomet:        Current Medications:     Last Reviewed on 2/12/2018 09:52 AM by Debo Dennis    Vagifem 10mcg Vaginal Tablets insert qod     Synthroid 0.112mg Tablet One PO QD   DNS     Cymbalta 60mg Capsules, Delayed Release Take 2 capsule(s) by mouth  QD     Hydrochlorothiazide (HCTZ) 25mg Tablet 1/2 to 1 pill daily     Prinivil 10mg Tablet Take 1 tablet(s) by mouth daily     Glucophage 500mg Tablet Take 2 tablet(s) by mouth bid     Glucose Reagent Blood Test Strips  Reagent Strips Check blood sugar daily with One Touch Ultra     Lancet   Lancet Check blood once daily as directed wi One Touch Ultra     Trazodone HCl 50mg Tablet 1 - 2  @ HS prn     ProAir HFA 90mcg/1actuation Oral Inhaler 1-2 puffs qid prn     Singulair  10mg Tablet Take 1 tablet(s) by mouth each evening     Codeine/Guaifenesin 10mg/100mg per 5ml Oral Liquid 1 tsp at bedtime, sugar free     Wellbutrin XL 150mg Tablets, Extended Release 1 tab daily     Toprol XL 25mg Tablets, Extended Release 1 tab PO  daily     Cholestoff 1800 daily     Cetirizine HCl 10mg Capsules Take 1 capsule(s) by mouth daily     Vitamin D3 1,000IU Capsules Take 1 capsule(s) by mouth daily     Mucinex 600mg Tablets, Extended Release PRN     eye vitamin     Xiidra 5% Ophthalmic Solution Instill 1 drop(s) to each eye q12h         OBJECTIVE:        Vitals:         Current: 2/12/2018 9:56:59 AM    Ht:  5 ft, 4.25 in;  Wt: 207.9 lbs;  BMI: 35.4    T: 97.8 F (oral);  BP: 115/74 mm Hg (left arm, sitting);  P: 75 bpm (finger clip, sitting);  sCr: 0.7 mg/dL;  GFR: 95.44    O2 Sat: 98 % (room air)        Exams:     PHYSICAL EXAM:     GENERAL:  well developed and nourished; appropriately groomed; in no apparent distress;     E/N/T: EARS:  normal external auditory canals and tympanic membranes;  grossly normal hearing; NOSE: bilateral maxillary sinus tenderness present; OROPHARYNX:  normal mucosa, dentition, gingiva, and posterior pharynx;     RESPIRATORY: normal respiratory rate and pattern with no distress; normal breath sounds with no rales, rhonchi, wheezes or rubs;     CARDIOVASCULAR: normal rate; rhythm is regular;  no systolic murmur;     LYMPHATIC: no enlargement of cervical or facial nodes;     NEUROLOGIC: GROSSLY INTACT     PSYCHIATRIC:  appropriate affect and demeanor; normal speech pattern; grossly normal memory;         ASSESSMENT           461.8   J01.90  Acute sinusitis, NEC              DDx:     401.1   I10  Essential hypertension              DDx:     784.0   R51  Headache              DDx:         ORDERS:         Meds Prescribed:       Refill of: ProAir HFA (Albuterol) 90mcg/1actuation Oral Inhaler 1-2 puffs qid prn  #8.5 (Eight point Five) gm Refills: 0       Refill of: Codeine/Guaifenesin 10mg/100mg per 5ml Oral Liquid 1 tsp at bedtime, sugar free  #4 (Four) oz Refills: 0       Cefdinir 300mg Capsules 1 bid for 10 days  #20 (Twenty) capsule(s) Refills: 0       Propranolol HCl 20mg Tablet 1 p.o. qhs  #30 (Thirty) tablet(s) Refills: 2                  PLAN:          Acute sinusitis, NEC send for kaspar         RECOMMENDATIONS given include: rest and increase oral fluid intake.      FOLLOW-UP: Advised to call if there is no improvement in 4 day(s).            Prescriptions:       Refill of: ProAir HFA (Albuterol) 90mcg/1actuation Oral Inhaler 1-2 puffs qid prn  #8.5 (Eight point Five) gm Refills: 0       Refill of: Codeine/Guaifenesin 10mg/100mg per 5ml Oral Liquid 1 tsp at bedtime, sugar free  #4 (Four) oz Refills: 0       Cefdinir 300mg Capsules 1 bid for 10 days  #20 (Twenty) capsule(s) Refills: 0          Essential hypertension wants to switch her beta blocker, did well on low dose atenolol at HS         RECOMMENDATIONS given include: perform routine monitoring of blood pressure with home blood pressure cuff.           Headache having headaches again, did better on atenolol, was switched due to availability ( will switch to propranolol, stop the toprol )           Prescriptions:       Propranolol HCl 20mg Tablet 1 p.o. qhs  #30 (Thirty) tablet(s) Refills: 2             Patient Recommendations:        For  Acute sinusitis, NEC:     Get plenty of rest. Increase oral fluid intake.          For  Essential hypertension:     Begin monitoring your blood pressure by brief nurse visits at our office, a home blood pressure monitor, or by checking on the machines in pharmacies or stores.  Keep a log of the readings.              CHARGE CAPTURE           **Please note: ICD descriptions below are intended for billing purposes only and may not represent clinical diagnoses**        Primary Diagnosis:         461.8 Acute sinusitis, NEC            J01.90    Acute sinusitis, unspecified              Orders:          [id number]   Office/outpatient visit; established patient, level 4  (In-House)           401.1 Essential hypertension            I10    Essential (primary) hypertension    784.0 Headache            R51    Headache

## 2021-05-18 NOTE — PROGRESS NOTES
Guillermina Gardner  1955     Office/Outpatient Visit    Visit Date: Mon, Apr 26, 2021 01:06 pm    Provider: Cecile Dennis N.P. (Assistant: Kendal Washington,  )    Location: [facility]        Electronically signed by Cecile Dennis N.P. on  04/26/2021 05:03:06 PM                             Subjective:        CC: Mrs. Gardner is a 66 year old White female.  Pt present to discuss medications;         HPI:           Mrs. Gardner presents with type 2 diabetes mellitus without complications.  Compliance with treatment has been good; she takes her medication as directed.  Current meds include an oral hypoglycemic ( Januvia ( 100 mg QD ) and metformin ) and insulin/injectable ( Trulicity- 0.75 ).  She reports home blood glucose readings have averaged fasting readings in the 120-130's mg/dL range.  Most recent lab results include Hemoglobin A1c:  7.3 (%) (04/21/2021), LDL:  65 (mg/dL) (04/21/2021), HDL:  65 (mg/dL) (04/21/2021), Triglycerides:  92 (mg/dL) (04/21/2021), Microalbuminuria:  8.4 (mg/g creat) (01/19/2021), Dilated Eye Exam by date:  11/06/2019 (10/08/2019), Foot Exam (Annual):  06/17/2019 (06/17/2019).  Has had vomiting since starting trulicity and diarrhea           Additionally, she presents with history of essential (primary) hypertension.  her current cardiac medication regimen includes a diuretic ( Hydrochlorothiazide (HCTZ) ), a beta-blocker ( Tenormin ), and an ACE inhibitor ( Zestril ).  Compliance with treatment has been good; she takes her medication as directed.            Concerning mixed hyperlipidemia, current treatment includes Crestor.  Compliance with treatment has been good; she takes her medication as directed.  She denies experiencing any hypercholesterolemia related symptoms.            Dx with anxiety disorder, unspecified; current treatment includes Cymbalta.  doing fine on cymbalta           Other specified hypothyroidism details; she is currently taking  Levothyroid, 112 mcg daily.  TSH was last checked <1 weeks ago.  The result was reported as normal.      ROS:     CONSTITUTIONAL:  Negative for fever.      CARDIOVASCULAR:  Negative for chest pain, palpitations, tachycardia, orthopnea, and edema.      RESPIRATORY:  Negative for recent cough and dyspnea.      GASTROINTESTINAL:  Positive for vomiting ( since taking trulicity ).      GENITOURINARY:  Positive for dyspareunia (dryness, on estrogen topical, helps, needs a refill).      NEUROLOGICAL:  Negative for dizziness and weakness.          Past Medical History / Family History / Social History:         Last Reviewed on 2021 01:26 PM by Cecile Dennis    Past Medical History:                 PAST MEDICAL HISTORY         Dizziness - Seeing Dr. Almaguer         GYNECOLOGICAL HISTORY:             PREVENTIVE HEALTH MAINTENANCE             Hepatitis C Medicare Screening: was last done 3-17-18; negative     MAMMOGRAM: Done within last 2 years and results in are chart was last done 01/15/21 stable         Surgical History:         : X 2;     Lap Band ; Procedures: Ablation     Positive for    thumb joint left replaced/repaired 8-6-15; and    right thumb replaced 18;;     COLONOSCOPY: was last done 2016 with normal results Dr Rosa Forte         Family History:     Father:  at age 89;  Type 2 Diabetes; Hypothyroidism;  Macular degeneration     Mother:  at age 90;  Hypertension;  Dementia     Sister(s): Hypertension; Endocrine Hypothyroidism         Social History:     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          Immunizations:     influenza, high-dose, quadrivalent (FLUZONE HIGH-DOSE QUAD -) 2020    SARS-COV-2  (COVID-19) vaccine, UNSPECIFIED 7/30/2020    SARS-COV-2 (COVID-19) vaccine, UNSPECIFIED 8/21/2020    Hep A, adult dose 5/11/2018    Hep A, adult dose 12/10/2018    zzFluzone pf-quadrivalent 3 and up 10/7/2016    zzFluzone pf-quadrivalent 3 and up 9/21/2017    Fluzone (3 + years dose) 10/18/2006    Fluzone (3 + years dose) 11/12/2007    Fluzone (3 + years dose) 10/2/2008    Fluzone (3 + years dose) 9/22/2009    Fluzone (3 + years dose) 9/16/2010    Fluzone (3 + years dose) 10/17/2011    Fluzone (3 + years dose) 11/6/2012    Fluzone (3 + years dose) 10/7/2014    Fluzone pf (3+ years dose) 11/13/2013    Fluzone pf (3+ years dose) 9/28/2015    Fluzone Quadrivalent (3+ years) 9/26/2018    Fluzone Quadrivalent (3+ years) 10/8/2019    Influenza A (H1N1) pf, IM (3+ years) Monovalent 11/11/2009    Adacel (Tdap) 9/16/2010    Boostrix (Tdap) 5/11/2018    COVID-19, mRNA, LNP-S, PF, 30 mcg/0.3 mL dose 9/14/2020        Allergies:     Last Reviewed on 4/26/2021 01:09 PM by Kendal Washington Perles: itching  (Adverse Reaction)    Aldomet:          Current Medications:     Last Reviewed on 4/26/2021 01:09 PM by Kendal Washington    metFORMIN 500 mg oral tablet [TAKE 2 TABLETS TWICE A DAY]    lisinopriL 10 mg oral tablet [TAKE 1 TABLET DAILY]    atenoloL 25 mg oral tablet [TAKE 1 TABLET DAILY]    hydroCHLOROthiazide 25 mg oral tablet [TAKE 1/2 TO 1 TABLET DAILY]    traZODone 50 mg oral tablet [1 - 2  @ HS prn]    eye vitamin     Mucinex 600 mg oral Tablet,Extended Release 12 hr [PRN]    DULoxetine 60 mg oral capsule,delayed release (enteric coated) [TAKE 2 CAPSULES EVERY DAY]    estradioL 10 mcg Vaginal tablet [INSERT 1 TABLET VAGINALLY  EVERY OTHER DAY]    buPROPion  mg oral Tablet, Extended Release 24 hr [TAKE 1 TABLET DAILY]    Vitamin D3 1,000 unit oral capsule [Take 1 capsule(s) by mouth daily]    Januvia 100 mg oral tablet [take 1 tablet (100 mg) by oral route once daily]    ProAir HFA 90mcg/1actuation Oral  Inhaler [1-2 puffs qid prn]    cetirizine 10 mg oral capsule [Take 1 capsule(s) by mouth daily]    tiZANidine 4 mg oral capsule [Take 1 capsule by mouth every 12 hours as needed for muscle spasms]    Lancet   Lancet [Check blood once daily as directed [facility] One Touch Ultra]    Xiidra 5 % ophthalmic (eye) Dropperette [Instill 1 drop(s) to each eye q12h]    Synthroid 112 mcg oral tablet [TAKE 1 TABLET BY MOUTH EVERY DAY]    Blood Glucose Test strips  [Check blood sugar one to two times daily. Accu chek Guide  Dx: E11.9]    lancets  [Check blood sugar one to two times daily.  One touch delica plus 33g.  DX: E11.9]    rosuvastatin 10 mg oral tablet [TAKE 1 TABLET BY MOUTH EVERY DAY]    SUMAtriptan succinate 50 mg oral tablet [take 1 tablet (50 mg) by oral route after onset of migraine; may repeat after 2 hours if headache returns, not to exceed 200mg in 24hrs]    Trulicity 0.75 mg/0.5 mL subcutaneous Pen Injector [inject 0.5 milliliter (0.75 mg) by subcutaneous route every 7 days]        Objective:        Vitals:         Current: 4/26/2021 1:16:48 PM    Ht:  5 ft, 4.25 in;  Wt: 206.8 lbs;  BMI: 35.2T: 97.1 F (temporal);  BP: 114/68 mm Hg (right arm, sitting);  P: 79 bpm (right arm (BP Cuff), sitting);  sCr: 1.04 mg/dL;  GFR: 61.71        Exams:     PHYSICAL EXAM:     GENERAL: vital signs recorded - well developed, well nourished;  no apparent distress;     NECK: carotid exam reveals no bruits;     RESPIRATORY: normal respiratory rate and pattern with no distress; normal breath sounds with no rales, rhonchi, wheezes or rubs;     CARDIOVASCULAR: normal rate; rhythm is regular;  no systolic murmur;    Peripheral Pulses: posterior tibial: equal bilaterally;  no edema;     BREAST/INTEGUMENT: skin of feet and toenails intact;     NEUROLOGIC: sensation intact to monofilament bilaterally;     PSYCHIATRIC:  appropriate affect and demeanor; normal speech pattern; grossly normal memory;         Assessment:         E11.9   Type 2  diabetes mellitus without complications       I10   Essential (primary) hypertension       E78.2   Mixed hyperlipidemia       F41.9   Anxiety disorder, unspecified       N95.9   Unspecified menopausal and perimenopausal disorder       E03.8   Other specified hypothyroidism           ORDERS:         Meds Prescribed:       [Refilled] estradioL 10 mcg Vaginal tablet [INSERT 1 TABLET VAGINALLY  EVERY OTHER DAY], #40 (forty) tablets, Refills: 1 (one)       [Refilled] rosuvastatin 10 mg oral tablet [TAKE 1 TABLET BY MOUTH EVERY DAY], #90 (ninety) tablets, Refills: 0 (zero)       [Refilled] DULoxetine 60 mg oral capsule,delayed release (enteric coated) [TAKE 2 CAPSULES EVERY DAY], #180 (one hundred and eighty) capsules, Refills: 1 (one)       [Refilled] Synthroid 112 mcg oral tablet [TAKE 1 TABLET BY MOUTH EVERY DAY], #90 (ninety) tablets, Refills: 1 (one)         Lab Orders:       FUTURE  Future order to be done at patients convenience  (Send-Out)            24116  ALDRR - [facility] Aldosterone/renin ratio  (Send-Out)            16397  BMP - [facility] Basic Metabolic Panel  (Send-Out)                      Plan:         Type 2 diabetes mellitus without complicationsstop trulicity, let me know how she is doing in 2 weeks , continue to monitor her sugars, continue metformin and januvia, updated her diabetes flow sheet, keep her upcoming eye exam appt with [facility] later this week        Essential (primary) hypertensionstay well hydrated, follow up BMP in 1 month / continue current rx's until next lab, copy of labs given to pt        FOLLOW-UP TESTING #1: FOLLOW-UP LABORATORY:  Labs to be scheduled in the future include Aldosterone/renin ratio and BMP.   Schedule a follow-up visit in 1 month.            Orders:       FUTURE  Future order to be done at patients convenience  (Send-Out)            [id number]  ALDRR - [facility] Aldosterone/renin ratio  (Send-Out)            23597  BMP - [facility] Basic Metabolic Panel  (Send-Out)              Mixed  hyperlipidemia        RECOMMENDATIONS given include: exercise and low cholesterol/low fat diet.            Prescriptions:       [Refilled] rosuvastatin 10 mg oral tablet [TAKE 1 TABLET BY MOUTH EVERY DAY], #90 (ninety) tablets, Refills: 0 (zero)         Anxiety disorder, unspecified          Prescriptions:       [Refilled] DULoxetine 60 mg oral capsule,delayed release (enteric coated) [TAKE 2 CAPSULES EVERY DAY], #180 (one hundred and eighty) capsules, Refills: 1 (one)         Unspecified menopausal and perimenopausal disorder          Prescriptions:       [Refilled] estradioL 10 mcg Vaginal tablet [INSERT 1 TABLET VAGINALLY  EVERY OTHER DAY], #40 (forty) tablets, Refills: 1 (one)         Other specified hypothyroidismTSH was in range, continue current rx         FOLLOW-UP: Schedule a follow-up visit in 6 months.            Prescriptions:       [Refilled] Synthroid 112 mcg oral tablet [TAKE 1 TABLET BY MOUTH EVERY DAY], #90 (ninety) tablets, Refills: 1 (one)             Patient Recommendations:        For  Essential (primary) hypertension:            The following laboratory testing has been ordered: metabolic panel, basic Schedule the above testing in 1 month.          For  Mixed hyperlipidemia:    Maintain a regular exercise program. Reduce the amount of cholesterol and saturated fat in your diet.          For  Other specified hypothyroidism:    Schedule a follow-up visit in 6 months.              Charge Capture:         Primary Diagnosis:     E11.9  Type 2 diabetes mellitus without complications           Orders:      27239  Office/outpatient visit; established patient, level 4  (In-House)              I10  Essential (primary) hypertension     E78.2  Mixed hyperlipidemia     F41.9  Anxiety disorder, unspecified     N95.9  Unspecified menopausal and perimenopausal disorder     E03.8  Other specified hypothyroidism

## 2021-05-18 NOTE — PROGRESS NOTES
Guillermina Gardner 1955     Office/Outpatient Visit    Visit Date: Mon, Dec 10, 2018 09:08 am    Provider: Cecile Dennis N.P. (Assistant: Rigoberto Chambers)    Location: [facility]        Electronically signed by Cecile Dennis N.P. on  12/10/2018 12:38:55 PM                             SUBJECTIVE:        CC:     Mrs. Gardner is a 63 year old White female.  This is a follow-up visit.  meds refills;         HPI:         Patient to be evaluated for hypercholesterolemia.  Current treatment includes a low cholesterol/low fat diet.          Dx with essential hypertension; her current cardiac medication regimen includes a diuretic ( HCTZ ), a beta-blocker ( Inderal ), and an ACE inhibitor ( Zestril ).  Mrs. Gardner does not check her blood pressure other than at her clinic appointments.  She is tolerating the medication well without side effects.  Compliance with treatment has been good; she takes her medication as directed.          Type 2 diabetes details; current meds include an oral hypoglycemic ( Glucophage and januvia ).  She reports home blood glucose readings have averaged fasting readings in the 115 mg/dL range.  Most recent lab results include Hemoglobin A1c:  6.2 (%) (07/24/2018), LDL:  117 (mg/dL) (07/24/2018), HDL:  66 (mg/dL) (07/24/2018), Triglycerides:  89 (mg/dL) (07/24/2018), Microalbuminuria:  23.4 (mg/g creat) (03/17/2018), Dilated Eye Exam by date:  08/11/2017 (06/24/2017), Foot Exam (Annual):  03/22/2018 (03/22/2018).          Concerning acquired hypothyroidism, she is currently taking Synthroid, 125 mcg daily.  TSH was last checked two months ago.  The result was reported as normal.      ROS:     CONSTITUTIONAL:  Negative for chills, fatigue, fever, and weight change.      CARDIOVASCULAR:  Negative for chest pain, palpitations, tachycardia, orthopnea, and edema.      RESPIRATORY:  Negative for cough, dyspnea, and hemoptysis.      NEUROLOGICAL:  Positive for headaches.  with bp  rx, preferred atenolol     PSYCHIATRIC:  Positive for anhedonia and insomnia.  on cymbalta 120 and wellbutrin 300, but has doubled her dose  a couple of times /takes trazodone prn         PMH/FMH/SH:     Last Reviewed on 12/10/2018 09:40 AM by Cecile Dennis    Past Medical History:                 PAST MEDICAL HISTORY         Dizziness - Seeing Dr. Malik         GYNECOLOGICAL HISTORY:             PREVENTIVE HEALTH MAINTENANCE             Hepatitis C Medicare Screening: was last done 3-17-18; negative     MAMMOGRAM: Done within last 2 years and results in are chart was last done 2018 stable         Surgical History:         : X 2;      Lap Band ; Procedures: Ablation     Positive for    thumb joint left replaced/repaired 8-6-15; and    right thumb replaced 18;;     COLONOSCOPY: was last done 2016 with normal results Dr Rosa Forte         Family History:     Father:  at age 89;  Type 2 Diabetes; Hypothyroidism;  Macular degeneration     Mother:  at age 90;  Hypertension;  Dementia     Sister(s): Hypertension; Endocrine Hypothyroidism         Social History:     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              Immunizations:     Hep A, adult dose 2018     zzFluzone pf-quadrivalent 3 and up 10/7/2016     zzFluzone pf-quadrivalent 3 and up 2017     Fluzone (3 + years dose) 10/18/2006     Fluzone (3 + years dose) 2007     Fluzone (3 + years dose) 10/2/2008     Fluzone (3 + years dose) 2009     Fluzone (3 + years dose) 2010     Fluzone (3 + years dose) 10/17/2011     Fluzone (3 + years dose) 2012     Fluzone (3 + years dose) 10/7/2014     Fluzone pf (3+ years dose) 2013      Fluzone pf (3+ years dose) 9/28/2015     Fluzone Quadrivalent (3+ years) 9/26/2018     Influenza A (H1N1) pf, IM (3+ years) Monovalent 11/11/2009     Adacel (Tdap) 9/16/2010     Boostrix (Tdap) 5/11/2018         Allergies:     Last Reviewed on 12/10/2018 09:14 AM by Rigoberto Chambers    Aldomet:        Current Medications:     Last Reviewed on 12/10/2018 09:15 AM by Rigoberto Chambers    Synthroid 0.125mg Tablet One PO QD  DNS     Prinivil 10mg Tablet Take 1 tablet(s) by mouth daily     Cymbalta 60mg Capsules, Delayed Release Take 2 capsule(s) by mouth  QD     Glucophage 500mg Tablet Take 2 tablet(s) by mouth bid     Januvia 50mg Tablet Take 1 tablet(s) by mouth daily     Vagifem 10mcg Vaginal Tablets insert qod     Hydrochlorothiazide (HCTZ) 25mg Tablet 1/2 to 1 pill daily     Trazodone HCl 50mg Tablet 1 - 2  @ HS prn     ProAir HFA 90mcg/1actuation Oral Inhaler 1-2 puffs qid prn     Glucose Reagent Blood Test Strips  Reagent Strips Check blood sugar daily with One Touch Ultra     Lancet   Lancet Check blood once daily as directed wirh One Touch Ultra     Propranolol HCl 20mg Tablet Take 1 tablet(s) by mouth qhs     Xiidra 5% Ophthalmic Solution Instill 1 drop(s) to each eye q12h     Cholestoff 1800 daily     Cetirizine HCl 10mg Capsules Take 1 capsule(s) by mouth daily     Vitamin D3 1,000IU Capsules Take 1 capsule(s) by mouth daily     Mucinex 600mg Tablets, Extended Release PRN     eye vitamin     Wellbutrin XL 300mg Tablets, Extended Release 1 tab daily         OBJECTIVE:        Vitals:         Current: 12/10/2018 9:18:08 AM    Ht:  5 ft, 4.25 in;  Wt: 207 lbs;  BMI: 35.3    T: 97.3 F (oral);  BP: 140/60 mm Hg (left arm, sitting);  sCr: 0.88 mg/dL;  GFR: 75.78        Repeat:     9:28:57 AM     BP:   130/68mm Hg (left arm, sitting)         Exams:     PHYSICAL EXAM:     GENERAL: vital signs recorded - well developed, well nourished;  no apparent distress;     EYES: PERRLA;     NECK: carotid exam reveals no bruits;      RESPIRATORY: normal respiratory rate and pattern with no distress; normal breath sounds with no rales, rhonchi, wheezes or rubs;     CARDIOVASCULAR: normal rate; rhythm is regular;  no systolic murmur; no edema;     NEUROLOGIC: GROSSLY INTACT     PSYCHIATRIC:  appropriate affect and demeanor; normal speech pattern; grossly normal memory;         Procedures:     Vaccination against hepatitis A     1. Hepatitis A (adult): 1.0 ml given IM in the left upper arm; administered by and;  lot number [id number]; expires 8/9/19             ASSESSMENT           272.0   E78.2  Hypercholesterolemia              DDx:     401.1   I10  Essential hypertension              DDx:     250.00   E11.9  Type 2 diabetes              DDx:     244.8   E03.8  Acquired hypothyroidism              DDx:     300.4   F41.9  Anxiety with depression              DDx:     V05.3   Z23  Vaccination against hepatitis A              DDx:         ORDERS:         Meds Prescribed:       Refill of: Prinivil (Lisinopril) 10mg Tablet Take 1 tablet(s) by mouth daily  #90 (Ninety) tablet(s) Refills: 1       Refill of: Glucophage (Metformin HCl) 500mg Tablet Take 2 tablet(s) by mouth bid  #360 (Three Fisher and Sixty) tablet(s) Refills: 1       Refill of: Januvia (Sitagliptin Phosphate) 50mg Tablet Take 1 tablet(s) by mouth daily  #90 (Ninety) tablet(s) Refills: 1       Refill of: Hydrochlorothiazide (HCTZ) 25mg Tablet 1/2 to 1 pill daily  #90 (Ninety) tablet(s) Refills: 1       Refill of: Synthroid (Levothyroxine Sodium) 0.125mg Tablet One PO QD  DNS  #90 (Ninety) tablet(s) Refills: 1       Refill of: Cymbalta (Duloxetine HCl) 60mg Capsules, Delayed Release Take 2 capsule(s) by mouth  QD  #180 (One Fisher and Eighty) capsule(s) Refills: 0       Refill of: Trazodone HCl 50mg Tablet 1 - 2  @ HS prn  #180 (One Fisher and Eighty) tablet(s) Refills: 1       Refill of: Wellbutrin XL (Bupropion HCl) 150mg Tablets, Extended Release 3 tabs in am daily  #90 (Ninety)  tablet(s) Refills: 1       Refill of: Atenolol 25mg Tablet Take 1 tablet(s) by mouth daily  #90 (Ninety) tablet(s) Refills: 1         Lab Orders:       [id number]  TSH - [facility] TSH  (Send-Out)         72350  DIAB1 - H LIPID,CMP, A1C: 34407, 09752, 10001  (Send-Out)           Procedures Ordered:       [id number]  Immunization administration; one vaccine  (In-House)         17739  HepA vaccine adult dose for intramuscular use  (In-House)                   PLAN:          Hypercholesterolemia needs second hep a         RECOMMENDATIONS given include: low cholesterol/low fat diet.            Patient Education Handouts:       Hepatitis A           Essential hypertension did better atenolol,  checked with crume's and the atenolol is available again           Prescriptions:       Refill of: Prinivil (Lisinopril) 10mg Tablet Take 1 tablet(s) by mouth daily  #90 (Ninety) tablet(s) Refills: 1       Refill of: Hydrochlorothiazide (HCTZ) 25mg Tablet 1/2 to 1 pill daily  #90 (Ninety) tablet(s) Refills: 1       Refill of: Atenolol 25mg Tablet Take 1 tablet(s) by mouth daily  #90 (Ninety) tablet(s) Refills: 1          Type 2 diabetes send for last eye exam     LABORATORY:  Labs ordered to be performed today include Diabetes Panel 1; CMP, Lipid, A1C.            Prescriptions:       Refill of: Glucophage (Metformin HCl) 500mg Tablet Take 2 tablet(s) by mouth bid  #360 (Three Jackson and Sixty) tablet(s) Refills: 1       Refill of: Januvia (Sitagliptin Phosphate) 50mg Tablet Take 1 tablet(s) by mouth daily  #90 (Ninety) tablet(s) Refills: 1           Orders:       [id number]  DIAB - H LIPID,CMP, A1C: 18649, 93678, 47661  (Send-Out)            Acquired hypothyroidism     LABORATORY:  Labs ordered to be performed today include TSH.            Prescriptions:       Refill of: Synthroid (Levothyroxine Sodium) 0.125mg Tablet One PO QD  DNS  #90 (Ninety) tablet(s) Refills: 1           Orders:       [id number]  TSH - [facility] TSH  (Send-Out)            Anxiety with  depression will increase her welllbutrin to 450 mg, continue cymbalta 120, counselor she is going to see, aidan almonte, she will get that appt set up          FOLLOW-UP: Advised to call if there is no improvement in 4-6 week(s).            Prescriptions:       Refill of: Cymbalta (Duloxetine HCl) 60mg Capsules, Delayed Release Take 2 capsule(s) by mouth  QD  #180 (One [place] and Eighty) capsule(s) Refills: 0       Refill of: Trazodone HCl 50mg Tablet 1 - 2  @ HS prn  #180 (One [place] and Eighty) tablet(s) Refills: 1       Refill of: Wellbutrin XL (Bupropion HCl) 150mg Tablets, Extended Release 3 tabs in am daily  #90 (Ninety) tablet(s) Refills: 1          Vaccination against hepatitis A           Orders:       [id number]  Immunization administration; one vaccine  (In-House)         11830  HepA vaccine adult dose for intramuscular use  (In-House)               Patient Recommendations:        For  Hypercholesterolemia:     Reduce the amount of cholesterol and saturated fat in your diet.              CHARGE CAPTURE           **Please note: ICD descriptions below are intended for billing purposes only and may not represent clinical diagnoses**        Primary Diagnosis:         272.0 Hypercholesterolemia            E78.2    Mixed hyperlipidemia              Orders:          62681   Office/outpatient visit; established patient, level 4  (In-House)           401.1 Essential hypertension            I10    Essential (primary) hypertension    250.00 Type 2 diabetes            E11.9    Type 2 diabetes mellitus without complications    244.8 Acquired hypothyroidism            E03.8    Other specified hypothyroidism    300.4 Anxiety with depression            F41.9    Anxiety disorder, unspecified    V05.3 Vaccination against hepatitis A            Z23    Encounter for immunization              Orders:          46356   Immunization administration; one vaccine  (In-House)             74452   HepA vaccine adult dose for intramuscular  use  (In-House)

## 2021-05-18 NOTE — PROGRESS NOTES
Guillermina Gardner 1955     Office/Outpatient Visit    Visit Date: Mon, Jul 23, 2018 01:06 pm    Provider: Cecile Dennis N.P. (Assistant: Cat Nassar MA)    Location: [facility]        Electronically signed by Cecile Dennis N.P. on  07/23/2018 03:53:55 PM                             SUBJECTIVE:        CC:     Mrs. Gardner is a 63 year old White female.  physical exam         HPI:         Well Woman Exam noted.  Her last physical exam was 1 year ago.  She is menopausal.  She performs breast self-exams monthly.    Her last Pap smear was in 5-4-17 and was normal.   Her last mammogram was in 5-5-17 and was normal.   Her last DEXA was in 2010? and was normal.   Preventative Health updated today.  Mrs. Gardner denies any history of abnormal Pap smears.  Tobacco: She has a past history of cigarette smoking; quit date:  1980.              PHQ-9 Depression Screening: Completed form scanned and in chart; Total Score 8 Alcohol Consumption Screening: Completed form scanned and in chart; Total Score 1     ROS:     CONSTITUTIONAL:  Negative for chills, fatigue, fever, and weight change.      EYES:  Negative for blurred vision, eye pain, and photophobia.      E/N/T:  Negative for hearing problems, E/N/T pain, congestion, rhinorrhea, epistaxis, hoarseness, and dental problems.      CARDIOVASCULAR:  Negative for chest pain, palpitations, tachycardia, orthopnea, and edema.      RESPIRATORY:  Negative for cough, dyspnea, and hemoptysis.      GASTROINTESTINAL:  Negative for abdominal pain, heartburn, constipation, diarrhea, and stool changes.      GENITOURINARY:  Negative for genital lesions, hematuria, menstrual problems, polyuria, abnormal vaginal bleeding, and vaginal discharge.      MUSCULOSKELETAL:  Negative for arthralgias, back pain, and myalgias.      INTEGUMENTARY/BREAST:  Negative for atypical moles, dry skin, pruritis, rashes, breast masses, and nipple discharge.      NEUROLOGICAL:  Positive  for dizziness ( intermittent issue, has been to PTA and it has helped ).      ENDOCRINE:  Negative for hair loss, heat/cold intolerance, polydipsia, and polyphagia.      ALLERGIC/IMMUNOLOGIC:  Negative for allergies, frequent illnesses, HIV exposure, and urticaria.      PSYCHIATRIC:  Positive for stressed re her new grand daughter in NICU.  just hospitalized this am         PMH/FMH/SH:     Last Reviewed on 2018 01:46 PM by Cecile Dennis    Past Medical History:                 PAST MEDICAL HISTORY         Dizziness - Seeing Dr. Malik         GYNECOLOGICAL HISTORY:             PREVENTIVE HEALTH MAINTENANCE             Hepatitis C Medicare Screening: was last done 3-17-18; negative     MAMMOGRAM: was last done 2017 stable         Surgical History:         : X 2;      Lap Band ; Procedures: Ablation     Positive for    thumb joint left replaced/repaired 8-6-15;;     COLONOSCOPY: was last done 2016 with normal results Dr Rosa Forte         Family History:     Father:  at age 89;  Type 2 Diabetes; Hypothyroidism;  Macular degeneration     Mother: ;  Hypertension;  Dementia     Sister(s): Hypertension; Endocrine Hypothyroidism         Social History:     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              Immunizations:     Hep A, adult dose 2018     zzFluzone pf-quadrivalent 3 and up 10/7/2016     zzFluzone pf-quadrivalent 3 and up 2017     Fluzone (3 + years dose) 10/18/2006     Fluzone (3 + years dose) 2007     Fluzone (3 + years dose) 10/2/2008     Fluzone (3 + years dose) 2009     Fluzone (3 + years dose) 2010     Fluzone (3 + years dose) 10/17/2011     Fluzone (3 + years dose)  11/6/2012     Fluzone (3 + years dose) 10/7/2014     Fluzone pf (3+ years dose) 11/13/2013     Fluzone pf (3+ years dose) 9/28/2015     Influenza A (H1N1) pf, IM (3+ years) Monovalent 11/11/2009     Adacel (Tdap) 9/16/2010     Boostrix (Tdap) 5/11/2018         Allergies:     Last Reviewed on 7/23/2018 01:24 PM by Cat Nassar    Aldomet:        Current Medications:     Last Reviewed on 7/23/2018 01:24 PM by Cat Nassar    Vagifem 10mcg Vaginal Tablets insert qod     Cymbalta 60mg Capsules, Delayed Release Take 2 capsule(s) by mouth  QD     Glucophage 500mg Tablet Take 2 tablet(s) by mouth bid     Hydrochlorothiazide (HCTZ) 25mg Tablet 1/2 to 1 pill daily     Prinivil 10mg Tablet Take 1 tablet(s) by mouth daily     Synthroid 0.112mg Tablet One PO QD   DNS     Trazodone HCl 50mg Tablet 1 - 2  @ HS prn     ProAir HFA 90mcg/1actuation Oral Inhaler 1-2 puffs qid prn     Glucose Reagent Blood Test Strips  Reagent Strips Check blood sugar daily with One Touch Ultra     Lancet   Lancet Check blood once daily as directed wi One Touch Ultra     Xiidra 5% Ophthalmic Solution Instill 1 drop(s) to each eye q12h     Cholestoff 1800 daily     Cetirizine HCl 10mg Capsules Take 1 capsule(s) by mouth daily     Vitamin D3 1,000IU Capsules Take 1 capsule(s) by mouth daily     Mucinex 600mg Tablets, Extended Release PRN     eye vitamin     Januvia 50mg Tablet Take 1 tablet(s) by mouth daily     Propranolol HCl 20mg Tablet Take 1 tablet(s) by mouth qhs         OBJECTIVE:        Vitals:         Current: 7/23/2018 1:24:26 PM    Ht:  5 ft, 4.25 in;  Wt: 209.3 lbs;  BMI: 35.6    T: 97.7 F (oral);  BP: 126/82 mm Hg (left arm, sitting);  P: 78 bpm (finger clip, sitting);  sCr: 0.74 mg/dL;  GFR: 90.54        Exams:     PHYSICAL EXAM:     GENERAL: vital signs recorded - well developed, well nourished;  no apparent distress;     EYES: extraocular movements intact; conjunctiva and cornea are normal; PERRLA;     E/N/T:  normal EACs, TMs,  nasal/oral mucosa, teeth, gingiva, and oropharynx;     NECK: range of motion is normal; thyroid is non-palpable;     RESPIRATORY: normal respiratory rate and pattern with no distress; normal breath sounds with no rales, rhonchi, wheezes or rubs;     CARDIOVASCULAR: normal rate; rhythm is regular;  no systolic murmur; no edema;     GASTROINTESTINAL: nontender; normal bowel sounds; no organomegaly;     LYMPHATIC: no enlargement of cervical or facial nodes; no axillary adenopathy;     BREAST/INTEGUMENT: BREASTS: breast exam is normal without masses, skin changes, or nipple discharge; SKIN: no significant rashes or lesions; no suspicious moles;     MUSCULOSKELETAL:  Normal range of motion, strength and tone;     NEUROLOGIC: GROSSLY INTACT     PSYCHIATRIC:  appropriate affect and demeanor; normal speech pattern; grossly normal memory;         Lab/Test Results:             Glucose, Urine:  Neg (07/23/2018),     Bilirubin, urine:  Negative (07/23/2018),     Ketones, Urine Strip:  Negative (07/23/2018),     Specific Gravity, urine:  1.025 (07/23/2018),     Blood in Urine:  negative (07/23/2018),     pH, urine:  5.5 (07/23/2018),     Protein Urine QL:  negative (07/23/2018),     Urobilinogen, urine:  0.2 E.U./dL (07/23/2018),     Nitrite, Urine:  Negative (07/23/2018),     Leukoctyes, urine:  Trace (07/23/2018),     Appearance:  Clear (07/23/2018),     collection source:  Clean-catch (07/23/2018),     Color:  Yellow (07/23/2018),     Performed by::  [facility] (07/23/2018),             ASSESSMENT:           V72.31     Well Woman Exam     V79.0   Z13.89  Screening for depression              DDx:         ORDERS:         Radiology/Test Orders:       [id number]  Screening digital breast tomosynthesis bi  (Send-Out)           Lab Orders:       [id number]  Urinalysis, automated, without microscopy  (In-House)           Other Orders:         Negative EtOH screen  (In-House)                   PLAN:          Well Woman Exam will need all  refills crume/labs in am, has an accucheck strips tests qd-bid sugars running 106-120's in the am ususally/ reviewed her paps, defer pap, set up a mammogram at [facility] in August         COUNSELING was provided today regarding the following topics: breast self-exam.      FOLLOW-UP: Schedule follow-up appointments on a p.r.n. basis.      RADIOLOGY:  I have ordered Screening 3D Mammogram Bilateral to be done today.            Orders:      [id number]  Urinalysis, automated, without microscopy  (In-House)         [id number]  Screening digital breast tomosynthesis bi  (Send-Out)            Screening for depression discussed her screening and current stress, will continue on cymbalta     MIPS PHQ-9 Depression Screening: Completed form scanned and in chart; Total Score 8 Negative alcohol screen           Orders:         Negative EtOH screen  (In-House)               Patient Recommendations:        For  Well Woman Exam:         You should regularly examine your breasts, easily done while in the shower or with lotion.  Feel and look for differences in consistency from month to month, especially noting knots or lumps, changes in skin appearance, nipple retraction or discharge.  Schedule follow-up appointments as needed.              CHARGE CAPTURE:           Primary Diagnosis:     V72.31    Well Woman Exam                Z01.419    Encounter for gynecological examination (general) (routine) without abnormal findings                       Orders:          [id number]   Preventive medicine, established patient, age 40-64 years  (In-House)             79440   Urinalysis, automated, without microscopy  (In-House)           V79.0 Screening for depression            Z13.89    Encounter for screening for other disorder              Orders:             Negative EtOH screen  (In-House)               ADDENDUMS:      ____________________________________    Addendum: 07/27/2018 08:17 AM - Cecile Dennis        Remove:      Z01.419   Encounter  for gynecological examination (general) (routine)     without abnormal findings    Add: Z00.00 Routine general medical exam

## 2021-05-18 NOTE — PROGRESS NOTES
Guillermina Gardner  1955     Office/Outpatient Visit    Visit Date:  03:41 pm    Provider: Cecile Dennis N.P. (Assistant: Basilia Salcido MA)    Location: [facility]        Electronically signed by Cecile Dennis N.P. on  2020 03:59:28 PM                             Subjective:        CC: Mrs. Gardner is a 65 year old White female.  High Blood sugars. They have been making her feel ill. [employer] [phone];         HPI:           Today's encounter is being done with a telehealth visit. She has consented verbally with two witnesses for todays treatment. Todays visit is being conducted by audio and video. Individuals present during the telemedicine consultation include rehana Sarmiento & SHANNA Baird In regard to the type 2 diabetes mellitus without complications, current meds include an oral hypoglycemic ( Glucophage XR ( 2000 mg daily ) and Januvia ( 50mg qd ) ).  She reports home blood glucose readings have been a bit high, with average fasting readings in the 150-180 mg/dL range.  Most recent lab results include Hemoglobin A1c:  6.5 (%) (2020), LDL:  133 (mg/dL) (2020), HDL:  66 (mg/dL) (2020), Triglycerides:  97 (mg/dL) (2020), Microalbuminuria:  12.3 (mg/g creat) (2020).  Sugars starting running higher with crestor, but she is tolerating crestor.      ROS:     CONSTITUTIONAL:  Negative for fever.      CARDIOVASCULAR:  Positive for BP running .   Negative for chest pain or palpitations.      RESPIRATORY:  Negative for recent cough and dyspnea.      NEUROLOGICAL:  Negative for dizziness, headaches, paresthesias, and weakness.          Past Medical History / Family History / Social History:         Last Reviewed on 2020 03:57 PM by Cecile Dennis    Past Medical History:                 PAST MEDICAL HISTORY         Dizziness - Seeing Dr. Malik         GYNECOLOGICAL HISTORY:             PREVENTIVE HEALTH  MAINTENANCE             Hepatitis C Medicare Screening: was last done 3-17-18; negative     MAMMOGRAM: Done within last 2 years and results in are chart was last done 19 stable         Surgical History:         : X 2;     Lap Band ; Procedures: Ablation     Positive for    thumb joint left replaced/repaired 8-6-15; and    right thumb replaced 18;;     COLONOSCOPY: was last done 2016 with normal results Dr Rosa Forte         Family History:     Father:  at age 89;  Type 2 Diabetes; Hypothyroidism;  Macular degeneration     Mother:  at age 90;  Hypertension;  Dementia     Sister(s): Hypertension; Endocrine Hypothyroidism         Social History:     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          Immunizations:     influenza, high-dose, quadrivalent (FLUZONE HIGH-DOSE QUAD ) 2020    Hep A, adult dose 2018    Hep A, adult dose 12/10/2018    zzFluzone pf-quadrivalent 3 and up 10/7/2016    zzFluzone pf-quadrivalent 3 and up 2017    Fluzone (3 + years dose) 10/18/2006    Fluzone (3 + years dose) 2007    Fluzone (3 + years dose) 10/2/2008    Fluzone (3 + years dose) 2009    Fluzone (3 + years dose) 2010    Fluzone (3 + years dose) 10/17/2011    Fluzone (3 + years dose) 2012    Fluzone (3 + years dose) 10/7/2014    Fluzone pf (3+ years dose) 2013    Fluzone pf (3+ years dose) 2015    Fluzone Quadrivalent (3+ years) 2018    Fluzone Quadrivalent (3+ years) 10/8/2019    Influenza A (H1N1) pf, IM (3+ years) Monovalent 2009    Adacel (Tdap) 2010    Boostrix (Tdap) 2018        Allergies:     Last Reviewed on 2020 03:44 PM by Basilia Salcido  Perles: itching  (Adverse Reaction)    Aldomet:          Current Medications:     Last Reviewed on 11/09/2020 03:44 PM by Basilia Salcido    lisinopriL 10 mg oral tablet [TAKE 1 TABLET DAILY]    atenoloL 25 mg oral tablet [TAKE 1 TABLET DAILY]    metFORMIN 500 mg oral tablet [TAKE 2 TABLETS TWICE A DAY]    traZODone 50 mg oral tablet [1 - 2  @ HS prn]    hydroCHLOROthiazide 25 mg oral tablet [TAKE 1/2 TO 1 TABLET DAILY]    eye vitamin     Mucinex 600 mg oral Tablet,Extended Release 12 hr [PRN]    DULoxetine 60 mg oral capsule,delayed release (enteric coated) [TAKE 2 CAPSULES EVERY DAY]    estradioL 10 mcg Vaginal tablet [INSERT 1 TABLET VAGINALLY  EVERY OTHER DAY]    buPROPion  mg oral Tablet, Extended Release 24 hr [TAKE 1 TABLET DAILY]    Vitamin D3 1,000 unit oral capsule [Take 1 capsule(s) by mouth daily]    Januvia 50 mg oral tablet [TAKE 1 TABLET DAILY]    ProAir HFA 90mcg/1actuation Oral Inhaler [1-2 puffs qid prn]    tiZANidine 4 mg oral capsule [Take 1 capsule by mouth every 12 hours as needed for muscle spasms]    cetirizine 10 mg oral capsule [Take 1 capsule(s) by mouth daily]    Lancet   Lancet [Check blood once daily as directed wi One Touch Ultra]    Xiidra 5 % ophthalmic (eye) Dropperette [Instill 1 drop(s) to each eye q12h]    Synthroid 112 mcg oral tablet [TAKE 1 TABLET BY MOUTH EVERY DAY]    Blood Glucose Test strips  [Check blood sugar one to two times daily. Accu chek soft view  Dx: E11.9]    lancets  [Check blood sugar one to two times daily.  One touch delica plus 33g.  DX: E11.9]    Crestor 10 mg oral tablet [take 1 tablet (10 mg) by oral route once daily]    SUMAtriptan succinate 50 mg oral tablet [take 1 tablet (50 mg) by oral route after onset of migraine; may repeat after 2 hours if headache returns, not to exceed 200mg in 24hrs]        Objective:        Exams:     PHYSICAL EXAM:     GENERAL: vital signs recorded - well developed, well nourished;  no apparent distress;      RESPIRATORY: normal appearance and symmetric expansion of chest wall;     NEUROLOGIC: GROSSLY INTACT     PSYCHIATRIC:  appropriate affect and demeanor; normal speech pattern; grossly normal memory;         Assessment:         E11.9   Type 2 diabetes mellitus without complications           ORDERS:         Meds Prescribed:       [Refilled] Januvia 100 mg oral tablet [take 1 tablet (100 mg) by oral route once daily], #90 (ninety) tablets, Refills: 0 (zero)                 Plan:         Type 2 diabetes mellitus without complicationsincrease her januvia to 100 mg     Telehealth: Verbal consent obtained for visit to occur via televideo conferencing     RECOMMENDATIONS: continue efforts with her diet weightloss and regular exercise.      FOLLOW-UP: Schedule a follow-up visit in 3 months. sooner if her sugars are not lowering with higher dose of januvia           Prescriptions:       [Refilled] Januvia 100 mg oral tablet [take 1 tablet (100 mg) by oral route once daily], #90 (ninety) tablets, Refills: 0 (zero)             Patient Recommendations:        For  Type 2 diabetes mellitus without complications:    Schedule a follow-up visit in 3 months.              Charge Capture:         Primary Diagnosis:     E11.9  Type 2 diabetes mellitus without complications           Orders:      [id number]  Office/outpatient visit; established patient, level 2  (In-House)

## 2021-05-18 NOTE — PROGRESS NOTES
Guillermina Gardner 1955     Office/Outpatient Visit    Visit Date: Mon, Jun 17, 2019 10:14 am    Provider: Cecile Dennis N.P. (Assistant: Aisha Thompson LPN)    Location: [facility]        Electronically signed by Cecile Dennis N.P. on  06/17/2019 12:22:28 PM                             SUBJECTIVE:        CC:     Mrs. Gardner is a 64 year old White female.  This is a follow-up visit.  Medication refills;         HPI:         Patient presents with hypercholesterolemia.  Current treatment includes a low cholesterol/low fat diet.  Was on pravastatin in past.  Does not want to take a statin.  (last filled 22017)         In regard to the essential hypertension, her current cardiac medication regimen includes a diuretic ( HCTZ ), a beta-blocker ( Tenormin ), and an ACE inhibitor ( Zestril ).  She is tolerating the medication well without side effects.  Compliance with treatment has been good; she takes her medication as directed and maintains her diet and exercise regimen.          In regard to the type 2 diabetes, current meds include an oral hypoglycemic ( Glucophage ( 1000mg bid ) and Januvia ).  She reports home blood glucose readings have averaged fasting readings in the 120's mg/dL range.  Most recent lab results include Hemoglobin A1c:  6.5 (%) (12/10/2018), LDL:  139 (mg/dL) (12/10/2018), HDL:  59 (mg/dL) (12/10/2018), Triglycerides:  109 (mg/dL) (12/10/2018), Microalbuminuria:  23.4 (mg/g creat) (03/17/2018), Dilated Eye Exam by date:  05/08/2019 (02/13/2019), Foot Exam (Annual):  03/22/2018 (03/22/2018).          Additionally, she presents with history of acquired hypothyroidism.  she is currently taking Synthroid, 125 mcg daily.  TSH was last checked more than 6 months ago.  The result was reported as normal.          In regard to the anxiety with depression, current medications include Cymbalta and Wellbutrin XL.  Going to a counselor and feels like that is helping.  Feels like rx is  working  Takes Trazodone for sleep         PHQ-9 Depression Screening: Completed form scanned and in chart; Total Score 6 Alcohol Consumption Screening: Completed form scanned and in chart; Total Score 2     ROS:     CONSTITUTIONAL:  Negative for chills, fatigue, fever, and weight change.      CARDIOVASCULAR:  Negative for chest pain, palpitations, tachycardia, orthopnea, and edema.      RESPIRATORY:  Negative for cough, dyspnea, and hemoptysis.      NEUROLOGICAL:  Negative for dizziness, headaches, paresthesias, and weakness.      PSYCHIATRIC:  Positive for seeing chrissy garcia since .          PMH/FMH/SH:     Last Reviewed on 2019 10:36 AM by Cecile Dennis    Past Medical History:                 PAST MEDICAL HISTORY         Dizziness - Seeing Dr. Malik         GYNECOLOGICAL HISTORY:             PREVENTIVE HEALTH MAINTENANCE             Hepatitis C Medicare Screening: was last done 3-17-18; negative     MAMMOGRAM: Done within last 2 years and results in are chart was last done 2018 stable         Surgical History:         : X 2;      Lap Band ; Procedures: Ablation     Positive for    thumb joint left replaced/repaired 8-6-15; and    right thumb replaced 18;;     COLONOSCOPY: was last done 2016 with normal results Dr Rosa Forte         Family History:     Father:  at age 89;  Type 2 Diabetes; Hypothyroidism;  Macular degeneration     Mother:  at age 90;  Hypertension;  Dementia     Sister(s): Hypertension; Endocrine Hypothyroidism         Social History:     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              Immunizations:     Hep A, adult dose 2018     Hep A, adult dose 12/10/2018      zzFluzone pf-quadrivalent 3 and up 10/7/2016     zzFluzone pf-quadrivalent 3 and up 9/21/2017     Fluzone (3 + years dose) 10/18/2006     Fluzone (3 + years dose) 11/12/2007     Fluzone (3 + years dose) 10/2/2008     Fluzone (3 + years dose) 9/22/2009     Fluzone (3 + years dose) 9/16/2010     Fluzone (3 + years dose) 10/17/2011     Fluzone (3 + years dose) 11/6/2012     Fluzone (3 + years dose) 10/7/2014     Fluzone pf (3+ years dose) 11/13/2013     Fluzone pf (3+ years dose) 9/28/2015     Fluzone Quadrivalent (3+ years) 9/26/2018     Influenza A (H1N1) pf, IM (3+ years) Monovalent 11/11/2009     Adacel (Tdap) 9/16/2010     Boostrix (Tdap) 5/11/2018         Allergies:     Last Reviewed on 6/17/2019 10:16 AM by Aisha Thompson    Aldomet:        Current Medications:     Last Reviewed on 6/17/2019 10:18 AM by Aisha Thompson    Cymbalta 60mg Capsules, Delayed Release Take 2 capsule(s) by mouth  QD     Januvia 50mg Tablet Take 1 tablet(s) by mouth daily     Hydrochlorothiazide (HCTZ) 25mg Tablet 1/2 to 1 pill daily     Prinivil 10mg Tablet Take 1 tablet(s) by mouth daily     Glucophage 500mg Tablet Take 2 tablet(s) by mouth bid     Atenolol 25mg Tablet Take 1 tablet(s) by mouth daily     ProAir HFA 90mcg/1actuation Oral Inhaler 1-2 puffs qid prn     Vagifem 10mcg Vaginal Tablets insert qod     Synthroid 0.125mg Tablet One PO QD  DNS     Trazodone HCl 50mg Tablet 1 - 2  @ HS prn     Glucose Reagent Blood Test Strips  Reagent Strips Check blood sugar daily with One Touch Ultra     Lancet   Lancet Check blood once daily as directed wirh One Touch Ultra     Xiidra 5% Ophthalmic Solution Instill 1 drop(s) to each eye q12h     Cholestoff 1800 daily     Cetirizine HCl 10mg Capsules Take 1 capsule(s) by mouth daily     Vitamin D3 1,000IU Capsules Take 1 capsule(s) by mouth daily     Mucinex 600mg Tablets, Extended Release PRN     eye vitamin     Wellbutrin XL 300mg Tablets, Extended Release 1 Tab daily          OBJECTIVE:        Vitals:         Current: 6/17/2019 10:22:04 AM    Ht:  5 ft, 4.25 in;  Wt: 202 lbs;  BMI: 34.4    T: 97.5 F (oral);  BP: 111/60 mm Hg (left arm, sitting);  P: 67 bpm (left arm (BP Cuff), sitting);  sCr: 0.92 mg/dL;  GFR: 70.84        Exams:     PHYSICAL EXAM:     GENERAL: vital signs recorded - well developed, well nourished;  no apparent distress;     NECK: carotid exam reveals no bruits;     RESPIRATORY: normal respiratory rate and pattern with no distress; normal breath sounds with no rales, rhonchi, wheezes or rubs;     CARDIOVASCULAR: normal rate; rhythm is regular;  no systolic murmur;    Peripheral Pulses: posterior tibial: equal bilaterally;  no edema;     BREAST/INTEGUMENT: skin of feet and toenails intact;     NEUROLOGIC: sensation intact to monofilament bilaterally;     PSYCHIATRIC:  appropriate affect and demeanor; normal speech pattern; grossly normal memory;         ASSESSMENT           272.0   E78.2  Hypercholesterolemia              DDx:     401.1   I10  Essential hypertension              DDx:     250.00   E11.9  Type 2 diabetes              DDx:     244.8   E03.8  Acquired hypothyroidism              DDx:     300.4   F41.9  Anxiety with depression              DDx:     V79.0   Z13.89  Screening for depression              DDx:         ORDERS:         Meds Prescribed:       Refill of: Wellbutrin XL (Bupropion HCl) 300mg Tablets, Extended Release 1 Tab daily  #90 (Ninety) tablet(s) Refills: 1       Refill of: Hydrochlorothiazide (HCTZ) 25mg Tablet 1/2 to 1 pill daily  #90 (Ninety) tablet(s) Refills: 1       Refill of: Prinivil (Lisinopril) 10mg Tablet Take 1 tablet(s) by mouth daily  #90 (Ninety) tablet(s) Refills: 1       Refill of: Atenolol 25mg Tablet Take 1 tablet(s) by mouth daily  #90 (Ninety) tablet(s) Refills: 1       Refill of: Januvia (Sitagliptin Phosphate) 50mg Tablet Take 1 tablet(s) by mouth daily  #90 (Ninety) tablet(s) Refills: 1       Refill of: Glucophage  (Metformin HCl) 500mg Tablet Take 2 tablet(s) by mouth bid  #360 (Three [place] and Sixty) tablet(s) Refills: 1       Refill of: Synthroid (Levothyroxine Sodium) 0.125mg Tablet One PO QD  DNS  #90 (Ninety) tablet(s) Refills: 1       Refill of: Cymbalta (Duloxetine HCl) 60mg Capsules, Delayed Release Take 2 capsule(s) by mouth  QD  #180 (One [place] and Eighty) capsule(s) Refills: 1       Refill of: Trazodone HCl 50mg Tablet 1 - 2  @ HS prn  #180 (One [place] and Eighty) tablet(s) Refills: 1         Lab Orders:       [id number]  DIAB2 - H CMP A1C LIPID AND MICRO ALBUM CR RATIO: [id number]  (Send-Out)         91523  TSH - H TSH  (Send-Out)           Other Orders:         Depression screen negative  (In-House)           Negative EtOH screen  (In-House)                   PLAN:          Hypercholesterolemia         RECOMMENDATIONS given include: low cholesterol/low fat diet.           Essential hypertension continue current rx's           Prescriptions:       Refill of: Hydrochlorothiazide (HCTZ) 25mg Tablet 1/2 to 1 pill daily  #90 (Ninety) tablet(s) Refills: 1       Refill of: Prinivil (Lisinopril) 10mg Tablet Take 1 tablet(s) by mouth daily  #90 (Ninety) tablet(s) Refills: 1       Refill of: Atenolol 25mg Tablet Take 1 tablet(s) by mouth daily  #90 (Ninety) tablet(s) Refills: 1          Type 2 diabetes updated diabetes flow sheet     LABORATORY:  Labs ordered to be performed today include Diabetes Panel 2;CMP, A1C, Lipid, Microalbumin:Creatinine Ratio.            Prescriptions:       Refill of: Januvia (Sitagliptin Phosphate) 50mg Tablet Take 1 tablet(s) by mouth daily  #90 (Ninety) tablet(s) Refills: 1       Refill of: Glucophage (Metformin HCl) 500mg Tablet Take 2 tablet(s) by mouth bid  #360 (Three [place] and Sixty) tablet(s) Refills: 1           Orders:       47719  DIAB2 - [facility] CMP A1C LIPID AND MICRO ALBUM CR RATIO: [id number]  (Send-Out)            Acquired  hypothyroidism     LABORATORY:  Labs ordered to be performed today include TSH.            Prescriptions:       Refill of: Synthroid (Levothyroxine Sodium) 0.125mg Tablet One PO QD  DNS  #90 (Ninety) tablet(s) Refills: 1           Orders:       42730  TSH - [facility] TSH  (Send-Out)            Anxiety with depression continue therapy with Thanh Weinstein           Prescriptions:       Refill of: Wellbutrin XL (Bupropion HCl) 300mg Tablets, Extended Release 1 Tab daily  #90 (Ninety) tablet(s) Refills: 1       Refill of: Cymbalta (Duloxetine HCl) 60mg Capsules, Delayed Release Take 2 capsule(s) by mouth  QD  #180 (One [place] and Eighty) capsule(s) Refills: 1       Refill of: Trazodone HCl 50mg Tablet 1 - 2  @ HS prn  #180 (One [place] and Eighty) tablet(s) Refills: 1          Screening for depression     MIPS PHQ-9 Depression Screening: Completed form scanned and in chart; Total Score 6; Negative Depression Screen Negative alcohol screen           Orders:         Depression screen negative  (In-House)           Negative EtOH screen  (In-House)               Patient Recommendations:        For  Hypercholesterolemia:     Reduce the amount of cholesterol and saturated fat in your diet.              CHARGE CAPTURE           **Please note: ICD descriptions below are intended for billing purposes only and may not represent clinical diagnoses**        Primary Diagnosis:         272.0 Hypercholesterolemia            E78.2    Mixed hyperlipidemia              Orders:          [id number]   Office/outpatient visit; established patient, level 4  (In-House)           401.1 Essential hypertension            I10    Essential (primary) hypertension    250.00 Type 2 diabetes            E11.9    Type 2 diabetes mellitus without complications    244.8 Acquired hypothyroidism            E03.8    Other specified hypothyroidism    300.4 Anxiety with depression            F41.9    Anxiety disorder, unspecified    V79.0 Screening for depression             Z13.89    Encounter for screening for other disorder              Orders:             Depression screen negative  (In-House)                Negative EtOH screen  (In-House)

## 2021-05-18 NOTE — PROGRESS NOTES
Guillermina Gardner 1955     Office/Outpatient Visit    Visit Date: Tue, Oct 8, 2019 09:38 am    Provider: Cecile Dennis N.P. (Assistant: Savi Castle MA)    Location: [facility]        Electronically signed by Cecile Dennis N.P. on  10/08/2019 11:15:41 AM                             SUBJECTIVE:        CC:     Mrs. Gardner is a 64 year old White female.  This is a follow-up visit.  check up;         HPI:         Hypercholesterolemia details; current treatment includes Cholestoff.          Dx with essential hypertension; her current cardiac medication regimen includes a diuretic ( HCTZ ), a beta-blocker ( Tenormin ), and an ACE inhibitor ( Zestril ).  She is tolerating the medication well without side effects.  Compliance with treatment has been good; she takes her medication as directed.          Additionally, she presents with history of type 2 diabetes.  compliance with treatment has been fair; she ran out of januvia due to insurance issues.  Current meds include an oral hypoglycemic ( Glucophage XR ( 2000 mg Q ) and januvia ).  She reports home blood glucose readings have averaged fasting readings in the 120-160 mg/dL range.  Most recent lab results include Weight (lb):  201.6 (10/08/2019), Systolic BP:  110 (10/08/2019), Diastolic BP:  47 (10/08/2019), Hemoglobin A1c:  6.4 (%) (06/17/2019), LDL:  105 (mg/dL) (06/17/2019), HDL:  63 (mg/dL) (06/17/2019), Triglycerides:  84 (mg/dL) (06/17/2019), Microalbuminuria:  10.5 (mg/g creat) (06/17/2019), Dilated Eye Exam by date:  05/08/2019 (02/13/2019), Foot Exam (Annual):  06/17/2019 (06/17/2019).          With regard to the acquired hypothyroidism, she is currently taking Levothyroid, 125 mcg daily.          With regard to the anxiety with depression, current medications include Cymbalta and Wellbutrin XL.  Wonders if she could take rx for ADD/sees bill edelen monthly     ROS:     CONSTITUTIONAL:  Negative for chills, fatigue, fever, and  weight change.      CARDIOVASCULAR:  Negative for chest pain, palpitations, tachycardia, orthopnea, and edema.      RESPIRATORY:  Negative for cough, dyspnea, and hemoptysis.      NEUROLOGICAL:  Negative for dizziness, headaches, paresthesias, and weakness.      PSYCHIATRIC:  Positive for difficulty concentrating.          PMH/FMH/SH:     Last Reviewed on 10/08/2019 10:10 AM by Cecile Dennis    Past Medical History:                 PAST MEDICAL HISTORY         Dizziness - Seeing Dr. Malik         GYNECOLOGICAL HISTORY:             PREVENTIVE HEALTH MAINTENANCE             Hepatitis C Medicare Screening: was last done 3-17-18; negative     MAMMOGRAM: Done within last 2 years and results in are chart was last done 19 stable         Surgical History:         : X 2;      Lap Band ; Procedures: Ablation     Positive for    thumb joint left replaced/repaired 8-6-15; and    right thumb replaced 18;;     COLONOSCOPY: was last done 2016 with normal results Dr Rosa Forte         Family History:     Father:  at age 89;  Type 2 Diabetes; Hypothyroidism;  Macular degeneration     Mother:  at age 90;  Hypertension;  Dementia     Sister(s): Hypertension; Endocrine Hypothyroidism         Social History:     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              Immunizations:     Hep A, adult dose 2018     Hep A, adult dose 12/10/2018     zzFluzone pf-quadrivalent 3 and up 10/7/2016     zzFluzone pf-quadrivalent 3 and up 2017     Fluzone (3 + years dose) 10/18/2006     Fluzone (3 + years dose) 2007     Fluzone (3 + years dose) 10/2/2008     Fluzone (3 + years dose) 2009     Fluzone (3 + years dose)  9/16/2010     Fluzone (3 + years dose) 10/17/2011     Fluzone (3 + years dose) 11/6/2012     Fluzone (3 + years dose) 10/7/2014     Fluzone pf (3+ years dose) 11/13/2013     Fluzone pf (3+ years dose) 9/28/2015     Fluzone Quadrivalent (3+ years) 9/26/2018     Influenza A (H1N1) pf, IM (3+ years) Monovalent 11/11/2009     Adacel (Tdap) 9/16/2010     Boostrix (Tdap) 5/11/2018         Allergies:     Last Reviewed on 10/08/2019 09:44 AM by Savi Castle Perles: itching (Adverse Reaction)    Aldomet:        Current Medications:     Last Reviewed on 10/08/2019 09:44 AM by Savi Castle    Vagifem 10mcg Vaginal Tablets insert qod     Glucose Reagent Blood Test Strips  Reagent Strips Check blood sugar daily with One Touch Ultra     Atenolol 25mg Tablet Take 1 tablet(s) by mouth daily     Cymbalta 60mg Capsules, Delayed Release Take 2 capsule(s) by mouth  QD     Glucophage 500mg Tablet Take 2 tablet(s) by mouth bid     Hydrochlorothiazide (HCTZ) 25mg Tablet 1/2 to 1 pill daily     Januvia 50mg Tablet Take 1 tablet(s) by mouth daily     Prinivil 10mg Tablet Take 1 tablet(s) by mouth daily     Synthroid 0.125mg Tablet One PO QD  DNS     Trazodone HCl 50mg Tablet 1 - 2  @ HS prn     ProAir HFA 90mcg/1actuation Oral Inhaler 1-2 puffs qid prn     Lancet   Lancet Check blood once daily as directed wirh One Touch Ultra     Xiidra 5% Ophthalmic Solution Instill 1 drop(s) to each eye q12h     Cholestoff 1800 daily     Cetirizine HCl 10mg Capsules Take 1 capsule(s) by mouth daily     Vitamin D3 1,000IU Capsules Take 1 capsule(s) by mouth daily     Mucinex 600mg Tablets, Extended Release PRN     eye vitamin     Wellbutrin XL 300mg Tablets, Extended Release 1 Tab daily         OBJECTIVE:        Vitals:         Current: 10/8/2019 9:44:58 AM    Ht:  5 ft, 4.25 in;  Wt: 201.6 lbs;  BMI: 34.3    T: 97.5 F (oral);  BP: 110/47 mm Hg (left arm, sitting);  P: 73 bpm (left arm (BP Cuff), sitting);  sCr: 1 mg/dL;  GFR:  65.12        Exams:     PHYSICAL EXAM:     GENERAL: vital signs recorded - well developed, well nourished;  no apparent distress;     NECK: carotid exam reveals no bruits;     RESPIRATORY: normal respiratory rate and pattern with no distress; normal breath sounds with no rales, rhonchi, wheezes or rubs;     CARDIOVASCULAR: normal rate; rhythm is regular;  no systolic murmur; no edema;     PSYCHIATRIC:  appropriate affect and demeanor; normal speech pattern; grossly normal memory;         Procedures:     Vaccination against other viral diseases, Influenza     1. Influenza, seasonal (children 3 years to adult): 0.5 ml unit dose given IM in the right upper arm; administered by KG;  lot number [id number]; expires 06/30/2020 Regarding contraindications to an Influenza vaccine: Denies moderate/severe illness with/without fever; serious reaction to eggs, egg proteins, gentamicin, gelatin, arginine, neomycin or polymixin; serious reaction after recieving previous influenza vaccines; and history of Guillain-[place] Syndrome.              ASSESSMENT           V04.81   Z23  Vaccination against other viral diseases, Influenza              DDx:     272.0   E78.2  Hypercholesterolemia              DDx:     401.1   I10  Essential hypertension              DDx:     250.00   E11.9  Type 2 diabetes              DDx:     244.8   E03.8  Acquired hypothyroidism              DDx:     300.4   F41.9  Anxiety with depression              DDx:         ORDERS:         Lab Orders:       FUTURE  Future order to be done at patients convenience  (Send-Out)         14486  [address] [place] [address] LIPID,CMP, A1C: 34948, 07266, 21835  (Send-Out)         FUTURE  Future order to be done at patients convenience  (Send-Out)         79522  [facility] TSH  (Send-Out)           Procedures Ordered:       86636  Immunization administration; one vaccine  (In-House)           Other Orders:       40047  Influenza virus vaccine, quadrivalent, split virus, preservative free 3  years of age & older  (In-House)                   PLAN:          Vaccination against other viral diseases, Influenza           Orders:       26778  Immunization administration; one vaccine  (In-House)         87546  Influenza virus vaccine, quadrivalent, split virus, preservative free 3 years of age & older  (In-House)            Hypercholesterolemia         RECOMMENDATIONS given include: low cholesterol/low fat diet.      FOLLOW-UP: fasting in early  for labs          Essential hypertension continue current rx's          Type 2 diabetes reviewed diabetes flow sheet         FOLLOW-UP TESTING #1: FOLLOW-UP LABORATORY:  Labs to be scheduled in the future include Diabetes Panel 1; CMP, Lipid, A1C.            Orders:       FUTURE  Future order to be done at patients convenience  (Send-Out)         65182  DIAB - [facility] LIPID,CMP, A1C: 77375, 80478, 37771  (Send-Out)            Acquired hypothyroidism         FOLLOW-UP TESTING #1: FOLLOW-UP LABORATORY:  Labs to be scheduled in the future include TSH.            Orders:       FUTURE  Future order to be done at patients convenience  (Send-Out)         60855  TSH - [facility] TSH  (Send-Out)            Anxiety with depression continue current Rx's, will see her therapist Thanh Weinstein next week and will look into ADD testing and then will consult with Dr. Vogt as patient prefers to be seen here at [facility]              Patient Recommendations:        For  Hypercholesterolemia:     Reduce the amount of cholesterol and saturated fat in your diet.          For  Type 2 diabetes:             The following laboratory testing has been ordered:         For  Acquired hypothyroidism:             The following laboratory testing has been ordered: TSH             CHARGE CAPTURE           **Please note: ICD descriptions below are intended for billing purposes only and may not represent clinical diagnoses**        Primary Diagnosis:         V04.81 Vaccination against other viral diseases,  Influenza            Z23    Encounter for immunization              Orders:          98217   Office/outpatient visit; established patient, level 4  (In-House)             94868   Immunization administration; one vaccine  (In-House)             99786   Influenza virus vaccine, quadrivalent, split virus, preservative free 3 years of age & older  (In-House)           272.0 Hypercholesterolemia            E78.2    Mixed hyperlipidemia    401.1 Essential hypertension            I10    Essential (primary) hypertension    250.00 Type 2 diabetes            E11.9    Type 2 diabetes mellitus without complications    244.8 Acquired hypothyroidism            E03.8    Other specified hypothyroidism    300.4 Anxiety with depression            F41.9    Anxiety disorder, unspecified

## 2021-05-18 NOTE — PROGRESS NOTES
Guillermina Gardner  1955     Office/Outpatient Visit    Visit Date: Mon, Feb 22, 2021 01:52 pm    Provider: Cecile Dennis N.P. (Assistant: Ruby Merino MA)    Location: [facility]        Electronically signed by Cecile Dennis N.P. on  02/22/2021 02:33:19 PM                             Subjective:        CC: Mrs. Gardner is a 66 year old White female.  This is a follow-up visit.  [phone] [employer] pt states she is not taking rosuvastatin due to insurance not covering it         HPI:           Patient to be evaluated for other specified hypothyroidism.  She is currently taking Synthroid, 112 mcg daily.  TSH was last checked >4 months ago.  The result was reported as low normal.            Type 2 diabetes mellitus without complications details; current meds include an oral hypoglycemic ( Glucophage XR and Januvia ( 100 mg QD ) ) and insulin/injectable ( Trulicity- 0.75 ).  She reports home blood glucose readings have averaged fasting readings in the 120-130's mg/dL range.  Most recent lab results include Hemoglobin A1c:  7.3 (%) (01/19/2021), LDL:  65 (mg/dL) (01/19/2021), HDL:  65 (mg/dL) (01/19/2021), Triglycerides:  80 (mg/dL) (01/19/2021), Microalbuminuria:  8.4 (mg/g creat) (01/19/2021), Dilated Eye Exam by date:  11/06/2019 (10/08/2019), Foot Exam (Annual):  06/17/2019 (06/17/2019).            Today's encounter is being done with a telehealth visit. She has consented verbally with two witnesses for todays treatment. Todays visit is being conducted by audio and video. Individuals present during the telemedicine consultation include rehana Sarmiento & SHANNA Baird           Concerning essential (primary) hypertension, her current cardiac medication regimen includes a diuretic ( Hydrochlorothiazide (HCTZ) ), a beta-blocker ( Tenormin ), and an ACE inhibitor ( Zestril ).  She did not bring her blood pressure diary, but says that typical readings show systolics in the 120s and  diastolics in the 70s.  She is tolerating the medication well without side effects.  Compliance with treatment has been good; she takes her medication as directed.      ROS:     CONSTITUTIONAL:  Negative for fever.      CARDIOVASCULAR:  Negative for chest pain, palpitations, tachycardia, orthopnea, and edema.      RESPIRATORY:  Negative for recent cough and dyspnea.      NEUROLOGICAL:  Negative for dizziness, headaches, paresthesias, and weakness.          Past Medical History / Family History / Social History:         Last Reviewed on 2021 02:30 PM by Cecile Dennis    Past Medical History:                 PAST MEDICAL HISTORY         Dizziness - Seeing Dr. Almaguer         GYNECOLOGICAL HISTORY:             PREVENTIVE HEALTH MAINTENANCE             Hepatitis C Medicare Screening: was last done 3-17-18; negative     MAMMOGRAM: Done within last 2 years and results in are chart was last done 01/15/21 stable         Surgical History:         : X 2;     Lap Band ; Procedures: Ablation     Positive for    thumb joint left replaced/repaired 8-6-15; and    right thumb replaced 18;;     COLONOSCOPY: was last done 2016 with normal results Dr Rosa Forte         Family History:     Father:  at age 89;  Type 2 Diabetes; Hypothyroidism;  Macular degeneration     Mother:  at age 90;  Hypertension;  Dementia     Sister(s): Hypertension; Endocrine Hypothyroidism         Social History:     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          Immunizations:     influenza, high-dose, quadrivalent (FLUZONE HIGH-DOSE QUAD -) 2020    Hep A, adult dose 2018    Hep A, adult dose 12/10/2018    zzFluzone pf-quadrivalent 3 and up  10/7/2016    zzFluzone pf-quadrivalent 3 and up 9/21/2017    Fluzone (3 + years dose) 10/18/2006    Fluzone (3 + years dose) 11/12/2007    Fluzone (3 + years dose) 10/2/2008    Fluzone (3 + years dose) 9/22/2009    Fluzone (3 + years dose) 9/16/2010    Fluzone (3 + years dose) 10/17/2011    Fluzone (3 + years dose) 11/6/2012    Fluzone (3 + years dose) 10/7/2014    Fluzone pf (3+ years dose) 11/13/2013    Fluzone pf (3+ years dose) 9/28/2015    Fluzone Quadrivalent (3+ years) 9/26/2018    Fluzone Quadrivalent (3+ years) 10/8/2019    Influenza A (H1N1) pf, IM (3+ years) Monovalent 11/11/2009    Adacel (Tdap) 9/16/2010    Boostrix (Tdap) 5/11/2018        Allergies:     Last Reviewed on 2/22/2021 01:56 PM by Ruby Merino Perles: itching  (Adverse Reaction)    Aldomet:          Current Medications:     Last Reviewed on 2/22/2021 01:56 PM by Ruby Merino    metFORMIN 500 mg oral tablet [TAKE 2 TABLETS TWICE A DAY]    atenoloL 25 mg oral tablet [TAKE 1 TABLET DAILY]    lisinopriL 10 mg oral tablet [TAKE 1 TABLET DAILY]    hydroCHLOROthiazide 25 mg oral tablet [TAKE 1/2 TO 1 TABLET DAILY]    traZODone 50 mg oral tablet [1 - 2  @ HS prn]    eye vitamin     Mucinex 600 mg oral Tablet,Extended Release 12 hr [PRN]    DULoxetine 60 mg oral capsule,delayed release (enteric coated) [TAKE 2 CAPSULES EVERY DAY]    estradioL 10 mcg Vaginal tablet [INSERT 1 TABLET VAGINALLY  EVERY OTHER DAY]    buPROPion  mg oral Tablet, Extended Release 24 hr [TAKE 1 TABLET DAILY]    Vitamin D3 1,000 unit oral capsule [Take 1 capsule(s) by mouth daily]    Januvia 100 mg oral tablet [take 1 tablet (100 mg) by oral route once daily]    ProAir HFA 90mcg/1actuation Oral Inhaler [1-2 puffs qid prn]    tiZANidine 4 mg oral capsule [Take 1 capsule by mouth every 12 hours as needed for muscle spasms]    cetirizine 10 mg oral capsule [Take 1 capsule(s) by mouth daily]    Lancet   Lancet [Check blood once daily as directed wirh One Touch  Ultra]    Xiidra 5 % ophthalmic (eye) Dropperette [Instill 1 drop(s) to each eye q12h]    Synthroid 112 mcg oral tablet [TAKE 1 TABLET BY MOUTH EVERY DAY]    Blood Glucose Test strips  [Check blood sugar one to two times daily. Accu chek Guide  Dx: E11.9]    lancets  [Check blood sugar one to two times daily.  One touch delica plus 33g.  DX: E11.9]    rosuvastatin 10 mg oral tablet [TAKE 1 TABLET BY MOUTH EVERY DAY]    SUMAtriptan succinate 50 mg oral tablet [take 1 tablet (50 mg) by oral route after onset of migraine; may repeat after 2 hours if headache returns, not to exceed 200mg in 24hrs]    Trulicity 0.75 mg/0.5 mL subcutaneous Pen Injector [inject 0.5 milliliter (0.75 mg) by subcutaneous route every 7 days]        Objective:        Exams:     PHYSICAL EXAM:     GENERAL: vital signs recorded - well developed, well nourished;  no apparent distress;     RESPIRATORY: normal appearance and symmetric expansion of chest wall;     NEUROLOGIC: GROSSLY INTACT     PSYCHIATRIC:  appropriate affect and demeanor; normal speech pattern; grossly normal memory;         Assessment:         E03.8   Other specified hypothyroidism       E11.9   Type 2 diabetes mellitus without complications       E78.2   Mixed hyperlipidemia       I10   Essential (primary) hypertension           ORDERS:         Meds Prescribed:       [Refilled] rosuvastatin 10 mg oral tablet [TAKE 1 TABLET BY MOUTH EVERY DAY], #90 (ninety) tablets, Refills: 0 (zero)       [Refilled] Trulicity 0.75 mg/0.5 mL subcutaneous Pen Injector [inject 0.5 milliliter (0.75 mg) by subcutaneous route every 7 days], #4 (four) each, Refills: 1 (one)         Lab Orders:       FUTURE  Future order to be done at patients convenience  (Send-Out)            93724  TSH - [facility] TSH  (Send-Out)            FUTURE  Future order to be done at patients convenience  (Send-Out)            52591  DIAB1 - [facility] LIPID,CMP, A1C: 72439, 80841, 39972  (Send-Out)                      Plan:         Other  specified hypothyroidismrenetta is in the Pfizer covid vaccine study and has received both vaccines     Telehealth: Verbal consent obtained for visit to occur via televideo conferencing     FOLLOW-UP TESTING #1: FOLLOW-UP LABORATORY:  Labs to be scheduled in the future include TSH.      FOLLOW-UP: 2 months for her labs           Orders:       FUTURE  Future order to be done at patients convenience  (Send-Out)            [id number]  [facility]  (Send-Out)              Type 2 diabetes mellitus without complicationscontinue current rx's and monitoring her sugars / check labs in 2 months /reviewed diabetes flow sheet        FOLLOW-UP TESTING #1: FOLLOW-UP LABORATORY:  Labs to be scheduled in the future include Diabetes Panel 1; CMP, Lipid, A1C.            Prescriptions:       [Refilled] Trulicity 0.75 mg/0.5 mL subcutaneous Pen Injector [inject 0.5 milliliter (0.75 mg) by subcutaneous route every 7 days], #4 (four) each, Refills: 1 (one)           Orders:       FUTURE  Future order to be done at patients convenience  (Send-Out)            [id number]  [facility] LIPID,CMP, A1C: 02986, 42758, 08930  (Send-Out)              Mixed hyperlipidemiaswitched pharmacies, had issues with getting some rx from her mail order company, wants rx to go to [facility]         RECOMMENDATIONS given include: low cholesterol/low fat diet.            Prescriptions:       [Refilled] rosuvastatin 10 mg oral tablet [TAKE 1 TABLET BY MOUTH EVERY DAY], #90 (ninety) tablets, Refills: 0 (zero)         Essential (primary) hypertension        RECOMMENDATIONS given include: perform routine monitoring of blood pressure with home blood pressure cuff.              Patient Recommendations:        For  Other specified hypothyroidism:            The following laboratory testing has been ordered: TSH         For  Type 2 diabetes mellitus without complications:            The following laboratory testing has been ordered:         For  Mixed hyperlipidemia:    Reduce the  amount of cholesterol and saturated fat in your diet.          For  Essential (primary) hypertension:    Begin monitoring your blood pressure by brief nurse visits at our office, a home blood pressure monitor, or by checking on the machines in pharmacies or stores.  Keep a log of the readings.              Charge Capture:         Primary Diagnosis:     E03.8  Other specified hypothyroidism           Orders:      [id number]  Office/outpatient visit; established patient, level 4  (In-House)              E11.9  Type 2 diabetes mellitus without complications     E78.2  Mixed hyperlipidemia     I10  Essential (primary) hypertension

## 2021-05-18 NOTE — PROGRESS NOTES
Guillermina Gardner  1955     Office/Outpatient Visit    Visit Date: Mon, Sep 28, 2020 11:26 am    Provider: Cecile Dennis N.P. (Assistant: Rigoberto Chambers, )    Location: [facility]        Electronically signed by Cecile Dennis N.P. on  09/28/2020 01:27:37 PM                             Subjective:        CC: Mrs. Gardner is a 65 year old White female.  med refill; [employer] video [phone]        HPI:           Patient to be evaluated for other specified hypothyroidism.  She is currently taking Synthroid, 112 mcg daily.  TSH was last checked >3 months ago.  The result was reported as normal.  Today's encounter is being done with a telehealth visit. She has consented verbally with two witnesses for todays treatment. Todays visit is being conducted by audio and video. Individuals present during the telemedicine consultation include rehana Sarmiento & SHANNA Baird           With regard to the type 2 diabetes mellitus without complications, she reports home blood glucose readings have averaged fasting readings in the <140, usually 120's mg/dL range.  Most recent lab results include Hemoglobin A1c:  6.9 (%) (05/19/2020), LDL:  127 (mg/dL) (05/19/2020), HDL:  58 (mg/dL) (05/19/2020), Triglycerides:  111 (mg/dL) (05/19/2020), Microalbuminuria:  12.3 (mg/g creat) (05/19/2020), Dilated Eye Exam by date:  11/06/2019 (10/08/2019), Foot Exam (Annual):  06/17/2019 (06/17/2019).            Dx with essential (primary) hypertension; her current cardiac medication regimen includes a diuretic ( Hydrochlorothiazide (HCTZ) ), a beta-blocker ( Tenormin ), and an ACE inhibitor ( Zestril ).  Mrs. Gardner does not check her blood pressure other than at her clinic appointments.  She is tolerating the medication well without side effects.  Compliance with treatment has been good; she takes her medication as directed.  Did have her bp checked at [facility] for COVID study and was 120/70          Concerning mixed  hyperlipidemia, current treatment includes a low cholesterol/low fat diet.      ROS:     CONSTITUTIONAL:  Negative for fever.  weight stable at 206    CARDIOVASCULAR:  Negative for chest pain, palpitations, tachycardia, orthopnea, and edema.      RESPIRATORY:  Negative for recent cough and dyspnea.      NEUROLOGICAL:  Negative for dizziness, headaches, paresthesias, and weakness.      PSYCHIATRIC:  Positive for doing well on cymbalta, needs refill.          Past Medical History / Family History / Social History:         Last Reviewed on 2020 01:22 PM by Cecile Dennis    Past Medical History:                 PAST MEDICAL HISTORY         Dizziness - Seeing Dr. Malik         GYNECOLOGICAL HISTORY:             PREVENTIVE HEALTH MAINTENANCE             Hepatitis C Medicare Screening: was last done 3-17-18; negative     MAMMOGRAM: Done within last 2 years and results in are chart was last done 19 stable         Surgical History:         : X 2;     Lap Band ; Procedures: Ablation     Positive for    thumb joint left replaced/repaired 8-6-15; and    right thumb replaced 18;;     COLONOSCOPY: was last done 2016 with normal results Dr Rosa Forte         Family History:     Father:  at age 89;  Type 2 Diabetes; Hypothyroidism;  Macular degeneration     Mother:  at age 90;  Hypertension;  Dementia     Sister(s): Hypertension; Endocrine Hypothyroidism         Social History:     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          Immunizations:     Hep A, adult dose 2018    Hep A, adult dose 12/10/2018    zzFluzone pf-quadrivalent 3 and up 10/7/2016    zzFluzone pf-quadrivalent 3 and up 2017    Fluzone (3 + years  dose) 10/18/2006    Fluzone (3 + years dose) 11/12/2007    Fluzone (3 + years dose) 10/2/2008    Fluzone (3 + years dose) 9/22/2009    Fluzone (3 + years dose) 9/16/2010    Fluzone (3 + years dose) 10/17/2011    Fluzone (3 + years dose) 11/6/2012    Fluzone (3 + years dose) 10/7/2014    Fluzone pf (3+ years dose) 11/13/2013    Fluzone pf (3+ years dose) 9/28/2015    Fluzone Quadrivalent (3+ years) 9/26/2018    Fluzone Quadrivalent (3+ years) 10/8/2019    Influenza A (H1N1) pf, IM (3+ years) Monovalent 11/11/2009    Adacel (Tdap) 9/16/2010    Boostrix (Tdap) 5/11/2018        Allergies:     Last Reviewed on 9/28/2020 11:26 AM by Rigoberto Chambers Perles: itching  (Adverse Reaction)    Aldomet:          Current Medications:     Last Reviewed on 9/28/2020 11:27 AM by Rigoberto Chambers    lisinopriL 10 mg oral tablet [TAKE 1 TABLET DAILY]    atenoloL 25 mg oral tablet [TAKE 1 TABLET DAILY]    metFORMIN 500 mg oral tablet [TAKE 2 TABLETS TWICE A DAY]    traZODone 50 mg oral tablet [1 - 2  @ HS prn]    hydroCHLOROthiazide 25 mg oral tablet [TAKE 1/2 TO 1 TABLET DAILY]    eye vitamin     Mucinex 600 mg oral Tablet,Extended Release 12 hr [PRN]    DULoxetine 60 mg oral capsule,delayed release (enteric coated) [TAKE 2 CAPSULES EVERY DAY]    estradioL 10 mcg Vaginal tablet [INSERT 1 TABLET VAGINALLY  EVERY OTHER DAY]    buPROPion  mg oral Tablet, Extended Release 24 hr [TAKE 1 TABLET DAILY]    Vitamin D3 1,000 unit oral capsule [Take 1 capsule(s) by mouth daily]    Januvia 50 mg oral tablet [TAKE 1 TABLET DAILY]    ProAir HFA 90mcg/1actuation Oral Inhaler [1-2 puffs qid prn]    tiZANidine 4 mg oral capsule [Take 1 capsule by mouth every 12 hours as needed for muscle spasms]    cetirizine 10 mg oral capsule [Take 1 capsule(s) by mouth daily]    Lancet   Lancet [Check blood once daily as directed wi One Touch Ultra]    Xiidra 5 % ophthalmic (eye) Dropperette [Instill 1 drop(s) to each eye q12h]    Synthroid 112  mcg oral tablet [TAKE 1 TABLET BY MOUTH EVERY DAY]    Blood Glucose Test strips  [Check blood sugar one to two times daily. Accu chek soft view  Dx: E11.9]    lancets  [Check blood sugar one to two times daily.  One touch delica plus 33g.  DX: E11.9]        Objective:        Exams:     PHYSICAL EXAM:     GENERAL: vital signs recorded - well developed, well nourished;  no apparent distress;     RESPIRATORY: normal appearance and symmetric expansion of chest wall;     NEUROLOGIC: GROSSLY INTACT     PSYCHIATRIC:  appropriate affect and demeanor; normal speech pattern; grossly normal memory;         Assessment:         E03.8   Other specified hypothyroidism       E11.9   Type 2 diabetes mellitus without complications       I10   Essential (primary) hypertension       E78.2   Mixed hyperlipidemia       F41.9   Anxiety disorder, unspecified           ORDERS:         Meds Prescribed:       [Refilled] Januvia 50 mg oral tablet [TAKE 1 TABLET DAILY], #90 (ninety) tablets, Refills: 1 (one)       [Refilled] Synthroid 112 mcg oral tablet [TAKE 1 TABLET BY MOUTH EVERY DAY], #90 (ninety) tablets, Refills: 1 (one)       [Refilled] DULoxetine 60 mg oral capsule,delayed release (enteric coated) [TAKE 2 CAPSULES EVERY DAY], #180 (one hundred and eighty) capsules, Refills: 1 (one)         Lab Orders:       [id number]  TSH - [facility] TSH  (Send-Out)            [id number]  DIAB1 - [facility] LIPID,CMP, A1C: 64656, 43628, 86819  (Send-Out)                      Plan:         Other specified hypothyroidismwants to wait on mammogram for now / will get flu vaccine later this week /was walking daily but had cut back on that due to the heat    LABORATORY:  Labs ordered to be performed today include TSH.  Telehealth: Verbal consent obtained for visit to occur via televideo conferencing     FOLLOW-UP: later this week for high dose flu vaccine and fasting for labs, order to allergy room           Prescriptions:       [Refilled] Synthroid 112 mcg oral tablet [TAKE 1  TABLET BY MOUTH EVERY DAY], #90 (ninety) tablets, Refills: 1 (one)           Orders:       [id number]  TSH - [facility] TSH  (Send-Out)              Type 2 diabetes mellitus without complications    LABORATORY:  Labs ordered to be performed today include Diabetes Panel 1; CMP, Lipid, A1C.            Prescriptions:       [Refilled] Januvia 50 mg oral tablet [TAKE 1 TABLET DAILY], #90 (ninety) tablets, Refills: 1 (one)           Orders:       [id number]  DIAB1 - [facility] LIPID,CMP, A1C: 10443, 04473, 07294  (Send-Out)              Essential (primary) hypertension        RECOMMENDATIONS given include: perform routine monitoring of blood pressure with home blood pressure cuff.          Mixed hyperlipidemia        RECOMMENDATIONS given include: exercise and low cholesterol/low fat diet.          Anxiety disorder, unspecified          Prescriptions:       [Refilled] DULoxetine 60 mg oral capsule,delayed release (enteric coated) [TAKE 2 CAPSULES EVERY DAY], #180 (one hundred and eighty) capsules, Refills: 1 (one)             Patient Recommendations:        For  Essential (primary) hypertension:    Begin monitoring your blood pressure by brief nurse visits at our office, a home blood pressure monitor, or by checking on the machines in pharmacies or stores.  Keep a log of the readings.          For  Mixed hyperlipidemia:    Maintain a regular exercise program. Reduce the amount of cholesterol and saturated fat in your diet.              Charge Capture:         Primary Diagnosis:     E03.8  Other specified hypothyroidism           Orders:      [id number]  Office/outpatient visit; established patient, level 4  (In-House)              E11.9  Type 2 diabetes mellitus without complications     I10  Essential (primary) hypertension     E78.2  Mixed hyperlipidemia     F41.9  Anxiety disorder, unspecified

## 2021-05-25 ENCOUNTER — OFFICE VISIT CONVERTED (OUTPATIENT)
Dept: FAMILY MEDICINE CLINIC | Age: 66
End: 2021-05-25
Attending: NURSE PRACTITIONER

## 2021-06-05 NOTE — PROGRESS NOTES
Guillermina Gardner  1955     Office/Outpatient Visit    Visit Date: Tue, May 25, 2021 01:46 pm    Provider: Cecile Dennis N.P. (Assistant: Kendal Washington,  )    Location: [facility]        Electronically signed by Cecile Dennis N.P. on  2021 03:38:34 PM                             Subjective:        CC: PT NOT TAKING TRULICITY Mrs. Gardner is a 66 year old White female.  Pt present with a cough. Pt states the cough began . Pt notes some sore throat, and sinus drainage.  Pt notes she was swabbed early for COVID for the study but the result will take a few days.;         HPI:           Mrs. Gardner presents with acute upper respiratory infection, unspecified.  These have been present for the past 2 days.  (worse in the last two days) The symptoms include nasal congestion, purulent nasal discharge and frontal sinus pain and pressure.  She denies exposure to ill contacts.  She has already tried to relieve the symptoms with Mucinex dm /inhaler.  Medical history is significant for allergies.      ROS:     CONSTITUTIONAL:  Negative for fever.      E/N/T:  Positive for sore throat ( intermittent ).      CARDIOVASCULAR:  Negative for chest pain, palpitations, tachycardia, orthopnea, and edema.      RESPIRATORY:  Negative for dyspnea.      NEUROLOGICAL:  Negative for dizziness, headaches, paresthesias, and weakness.          Past Medical History / Family History / Social History:         Last Reviewed on 2021 02:19 PM by Cecile Dennis    Past Medical History:                 PAST MEDICAL HISTORY         Dizziness - Seeing Dr. Almaguer         GYNECOLOGICAL HISTORY:             PREVENTIVE HEALTH MAINTENANCE             Hepatitis C Medicare Screening: was last done 3-17-18; negative     MAMMOGRAM: Done within last 2 years and results in are chart was last done 01/15/21 stable         Surgical History:         : X 2;     Lap Band ; Procedures: Ablation      Positive for    thumb joint left replaced/repaired 8-6-15; and    right thumb replaced 18;;     COLONOSCOPY: was last done 2016 with normal results Dr Rosa Forte         Family History:     Father:  at age 89;  Type 2 Diabetes; Hypothyroidism;  Macular degeneration     Mother:  at age 90;  Hypertension;  Dementia     Sister(s): Hypertension; Endocrine Hypothyroidism         Social History:     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          Immunizations:     COVID-19, mRNA, LNP-S, PF, 30 mcg/0.3 mL dose 2020    influenza, high-dose, quadrivalent (FLUZONE HIGH-DOSE QUAD ) 2020    SARS-COV-2 (COVID-19) vaccine, UNSPECIFIED 2020    SARS-COV-2 (COVID-19) vaccine, UNSPECIFIED 2020    Hep A, adult dose 2018    Hep A, adult dose 12/10/2018    zzFluzone pf-quadrivalent 3 and up 10/7/2016    zzFluzone pf-quadrivalent 3 and up 2017    Fluzone (3 + years dose) 10/18/2006    Fluzone (3 + years dose) 2007    Fluzone (3 + years dose) 10/2/2008    Fluzone (3 + years dose) 2009    Fluzone (3 + years dose) 2010    Fluzone (3 + years dose) 10/17/2011    Fluzone (3 + years dose) 2012    Fluzone (3 + years dose) 10/7/2014    Fluzone pf (3+ years dose) 2013    Fluzone pf (3+ years dose) 2015    Fluzone Quadrivalent (3+ years) 2018    Fluzone Quadrivalent (3+ years) 10/8/2019    Influenza A (H1N1) pf, IM (3+ years) Monovalent 2009    Adacel (Tdap) 2010    Boostrix (Tdap) 2018        Allergies:     Last Reviewed on 2021 01:50 PM by Kendal Washingtones: itching  (Adverse Reaction)    Aldomet:          Current Medications:     Last Reviewed on 2021  01:50 PM by Kendal Washington    metFORMIN 500 mg oral tablet [TAKE 2 TABLETS TWICE A DAY]    lisinopriL 10 mg oral tablet [TAKE 1 TABLET DAILY]    atenoloL 25 mg oral tablet [TAKE 1 TABLET DAILY]    hydroCHLOROthiazide 25 mg oral tablet [TAKE 1/2 TO 1 TABLET DAILY]    traZODone 50 mg oral tablet [1 - 2  @ HS prn]    eye vitamin     Mucinex 600 mg oral Tablet,Extended Release 12 hr [PRN]    DULoxetine 60 mg oral capsule,delayed release (enteric coated) [TAKE 2 CAPSULES EVERY DAY]    buPROPion  mg oral Tablet, Extended Release 24 hr [TAKE 1 TABLET DAILY]    Vitamin D3 1,000 unit oral capsule [Take 1 capsule(s) by mouth daily]    Januvia 100 mg oral tablet [take 1 tablet (100 mg) by oral route once daily]    ProAir HFA 90mcg/1actuation Oral Inhaler [1-2 puffs qid prn]    cetirizine 10 mg oral capsule [Take 1 capsule(s) by mouth daily]    tiZANidine 4 mg oral capsule [Take 1 capsule by mouth every 12 hours as needed for muscle spasms]    Lancet   Lancet [Check blood once daily as directed wi One Touch Ultra]    Xiidra 5 % ophthalmic (eye) Dropperette [Instill 1 drop(s) to each eye q12h]    Synthroid 112 mcg oral tablet [TAKE 1 TABLET BY MOUTH EVERY DAY]    lancets  [Check blood sugar one to two times daily.  One touch delica plus 33g.  DX: E11.9]    Blood Glucose Test strips  [Check blood sugar one to two times daily. Accu chek Guide  Dx: E11.9]    rosuvastatin 10 mg oral tablet [TAKE 1 TABLET BY MOUTH EVERY DAY]    SUMAtriptan succinate 50 mg oral tablet [take 1 tablet (50 mg) by oral route after onset of migraine; may repeat after 2 hours if headache returns, not to exceed 200mg in 24hrs]    Rybelsus 3 mg oral tablet [take 1 tablet (3 mg) by oral route once daily ]    Premarin 0.625 mg/gram Vaginal Cream [apply 0.5 gram by topical route once daily cyclically, 3 weeks on and1 week off]        Objective:        Vitals:         Current: 5/25/2021 1:49:52 PM    Ht:  5 ft, 4.25 in;  Wt: 205.4 lbs;  BMI: 35.0T: 96.9  F (temporal);  BP: 117/67 mm Hg (left arm, sitting);  P: 75 bpm (left arm (BP Cuff), sitting);  sCr: 0.95 mg/dL;  GFR: 67.36        Exams:     PHYSICAL EXAM:     GENERAL:  well developed and nourished; appropriately groomed; in no apparent distress;     E/N/T: EARS:  normal external auditory canals and tympanic membranes;  grossly normal hearing; NOSE: bilateral frontal sinus tenderness present; OROPHARYNX:  normal mucosa, dentition, gingiva, and posterior pharynx;     RESPIRATORY: normal respiratory rate and pattern with no distress; normal breath sounds with no rales, rhonchi, wheezes or rubs;     CARDIOVASCULAR: normal rate; rhythm is regular;  no systolic murmur;     LYMPHATIC: no enlargement of cervical or facial nodes;         Assessment:         J01.90   Acute sinusitis, unspecified           ORDERS:         Meds Prescribed:       [New Rx] Augmentin 875-125 mg oral tablet [take 1 tablet by oral route every 12 hours], #20 (twenty) tablets, Refills: 0 (zero)       [New Rx] triamcinolone acetonide 0.1 % Topical Cream [apply a thin layer to the affected area(s) by topical route 2 times per day], #30 (thirty) grams, Refills: 1 (one)                 Plan:         Acute sinusitis, unspecified        RECOMMENDATIONS given include: rest and increase oral fluid intake.      FOLLOW-UP: Advised to call if there is no improvement in 4 day(s).            Prescriptions:       [New Rx] Augmentin 875-125 mg oral tablet [take 1 tablet by oral route every 12 hours], #20 (twenty) tablets, Refills: 0 (zero)             Other Prescriptions: requested rx steroid cream for her contact derm she gets during the summer       [New Rx] triamcinolone acetonide 0.1 % Topical Cream [apply a thin layer to the affected area(s) by topical route 2 times per day], #30 (thirty) grams, Refills: 1 (one)         Patient Recommendations:        For  Acute sinusitis, unspecified:    Get plenty of rest. Increase oral fluid intake.              Charge  Capture:         Primary Diagnosis:     J01.90  Acute sinusitis, unspecified           Orders:      [id number]  Office/outpatient visit; established patient, level 3  (In-House)

## 2021-06-15 RX ORDER — SUMATRIPTAN 50 MG/1
50 TABLET, FILM COATED ORAL
COMMUNITY
End: 2021-06-15 | Stop reason: SDUPTHER

## 2021-06-15 NOTE — TELEPHONE ENCOUNTER
Pharmacy requesting a refill of Metformin 500mg two bid. LV- 05/25/21, LF- 02/17/21, #360. Also requesting a refill of Sumatriptan succinate 50mg one at onset of migraine, may repeat in 2 hrs, max dose 200mg in 24 hr.    Pt also said she was supposed to get back with you about her blood sugars after taking the Rybelsus 3mg. She said her blood sugars have been running in the 150-154. She said you said you may increase the dose.

## 2021-06-16 RX ORDER — SUMATRIPTAN 50 MG/1
TABLET, FILM COATED ORAL
Qty: 9 TABLET | Refills: 2 | Status: SHIPPED | OUTPATIENT
Start: 2021-06-16 | End: 2022-02-01

## 2021-06-29 ENCOUNTER — TELEPHONE (OUTPATIENT)
Dept: FAMILY MEDICINE CLINIC | Age: 66
End: 2021-06-29

## 2021-06-29 NOTE — TELEPHONE ENCOUNTER
Caller: Guillermina Gardner    Relationship: Self    Best call back number: [phone]     What is the best time to reach you: ANY    What was the call regarding: PT MARIE DIN AND IS HAVING ISSUES WITH PRE AUTHORIZATION OF HER MEDS.  PLEASE CALL BACK AND ADVISE, THANK YOU.    Do you require a callback: YES

## 2021-07-01 VITALS
HEART RATE: 84 BPM | BODY MASS INDEX: 34.56 KG/M2 | OXYGEN SATURATION: 98 % | WEIGHT: 202.4 LBS | TEMPERATURE: 98.5 F | HEIGHT: 64 IN | SYSTOLIC BLOOD PRESSURE: 109 MMHG | DIASTOLIC BLOOD PRESSURE: 68 MMHG

## 2021-07-01 VITALS
HEIGHT: 64 IN | DIASTOLIC BLOOD PRESSURE: 74 MMHG | SYSTOLIC BLOOD PRESSURE: 115 MMHG | OXYGEN SATURATION: 98 % | WEIGHT: 207.9 LBS | TEMPERATURE: 97.8 F | BODY MASS INDEX: 35.49 KG/M2 | HEART RATE: 75 BPM

## 2021-07-01 VITALS
BODY MASS INDEX: 34.66 KG/M2 | WEIGHT: 203 LBS | SYSTOLIC BLOOD PRESSURE: 118 MMHG | TEMPERATURE: 99 F | HEIGHT: 64 IN | DIASTOLIC BLOOD PRESSURE: 64 MMHG | HEART RATE: 98 BPM

## 2021-07-01 VITALS
HEART RATE: 91 BPM | SYSTOLIC BLOOD PRESSURE: 134 MMHG | DIASTOLIC BLOOD PRESSURE: 86 MMHG | WEIGHT: 207.9 LBS | BODY MASS INDEX: 35.49 KG/M2 | HEIGHT: 64 IN | TEMPERATURE: 97.8 F

## 2021-07-01 VITALS
DIASTOLIC BLOOD PRESSURE: 82 MMHG | HEART RATE: 81 BPM | BODY MASS INDEX: 36.07 KG/M2 | SYSTOLIC BLOOD PRESSURE: 124 MMHG | WEIGHT: 211.3 LBS | TEMPERATURE: 98.7 F | HEIGHT: 64 IN

## 2021-07-01 VITALS
SYSTOLIC BLOOD PRESSURE: 130 MMHG | DIASTOLIC BLOOD PRESSURE: 68 MMHG | WEIGHT: 207 LBS | HEIGHT: 64 IN | BODY MASS INDEX: 35.34 KG/M2 | TEMPERATURE: 97.3 F

## 2021-07-01 VITALS
HEART RATE: 73 BPM | WEIGHT: 201.6 LBS | DIASTOLIC BLOOD PRESSURE: 47 MMHG | SYSTOLIC BLOOD PRESSURE: 110 MMHG | BODY MASS INDEX: 34.42 KG/M2 | HEIGHT: 64 IN | TEMPERATURE: 97.5 F

## 2021-07-01 VITALS
HEIGHT: 64 IN | WEIGHT: 209.3 LBS | BODY MASS INDEX: 35.73 KG/M2 | TEMPERATURE: 97.7 F | SYSTOLIC BLOOD PRESSURE: 126 MMHG | DIASTOLIC BLOOD PRESSURE: 82 MMHG | HEART RATE: 78 BPM

## 2021-07-01 VITALS
DIASTOLIC BLOOD PRESSURE: 60 MMHG | TEMPERATURE: 97.5 F | WEIGHT: 202 LBS | BODY MASS INDEX: 34.49 KG/M2 | HEART RATE: 67 BPM | SYSTOLIC BLOOD PRESSURE: 111 MMHG | HEIGHT: 64 IN

## 2021-07-02 ENCOUNTER — TRANSCRIBE ORDERS (OUTPATIENT)
Dept: ADMINISTRATIVE | Facility: HOSPITAL | Age: 66
End: 2021-07-02

## 2021-07-02 VITALS
BODY MASS INDEX: 35.71 KG/M2 | WEIGHT: 209.2 LBS | SYSTOLIC BLOOD PRESSURE: 105 MMHG | DIASTOLIC BLOOD PRESSURE: 69 MMHG | TEMPERATURE: 97 F | HEART RATE: 79 BPM | HEIGHT: 64 IN

## 2021-07-02 VITALS
WEIGHT: 206.8 LBS | HEART RATE: 79 BPM | BODY MASS INDEX: 35.3 KG/M2 | SYSTOLIC BLOOD PRESSURE: 114 MMHG | HEIGHT: 64 IN | DIASTOLIC BLOOD PRESSURE: 68 MMHG | TEMPERATURE: 97.1 F

## 2021-07-02 VITALS
TEMPERATURE: 96.9 F | HEIGHT: 64 IN | BODY MASS INDEX: 35.07 KG/M2 | DIASTOLIC BLOOD PRESSURE: 67 MMHG | HEART RATE: 75 BPM | SYSTOLIC BLOOD PRESSURE: 117 MMHG | WEIGHT: 205.4 LBS

## 2021-07-02 DIAGNOSIS — N28.1 RENAL CYST: Primary | ICD-10-CM

## 2021-07-08 ENCOUNTER — TRANSCRIBE ORDERS (OUTPATIENT)
Dept: ADMINISTRATIVE | Facility: HOSPITAL | Age: 66
End: 2021-07-08

## 2021-07-08 ENCOUNTER — LAB (OUTPATIENT)
Dept: LAB | Facility: HOSPITAL | Age: 66
End: 2021-07-08

## 2021-07-08 DIAGNOSIS — E11.9 DIABETES MELLITUS WITHOUT COMPLICATION (HCC): ICD-10-CM

## 2021-07-08 DIAGNOSIS — N18.2 CHRONIC KIDNEY DISEASE, STAGE II (MILD): Primary | ICD-10-CM

## 2021-07-08 DIAGNOSIS — I10 ESSENTIAL HYPERTENSION, MALIGNANT: ICD-10-CM

## 2021-07-08 DIAGNOSIS — N18.2 CHRONIC KIDNEY DISEASE, STAGE II (MILD): ICD-10-CM

## 2021-07-08 LAB
ALBUMIN SERPL-MCNC: 3.9 G/DL (ref 3.5–5.2)
ALBUMIN UR-MCNC: <1.2 MG/DL
ALBUMIN UR-MCNC: <1.2 MG/DL
ALBUMIN/GLOB SERPL: 1.5 G/DL
ALP SERPL-CCNC: 123 U/L (ref 39–117)
ALT SERPL W P-5'-P-CCNC: 16 U/L (ref 1–33)
ANION GAP SERPL CALCULATED.3IONS-SCNC: 8.6 MMOL/L (ref 5–15)
AST SERPL-CCNC: 14 U/L (ref 1–32)
BASOPHILS # BLD AUTO: 0.03 10*3/MM3 (ref 0–0.2)
BASOPHILS NFR BLD AUTO: 0.4 % (ref 0–1.5)
BILIRUB SERPL-MCNC: 0.3 MG/DL (ref 0–1.2)
BUN SERPL-MCNC: 14 MG/DL (ref 8–23)
BUN/CREAT SERPL: 13.5 (ref 7–25)
CALCIUM SPEC-SCNC: 9.2 MG/DL (ref 8.6–10.5)
CHLORIDE SERPL-SCNC: 101 MMOL/L (ref 98–107)
CO2 SERPL-SCNC: 29.4 MMOL/L (ref 22–29)
CREAT SERPL-MCNC: 1.04 MG/DL (ref 0.57–1)
CREAT UR-MCNC: 70.9 MG/DL
CREAT UR-MCNC: 87 MG/DL
DEPRECATED RDW RBC AUTO: 45.9 FL (ref 37–54)
EOSINOPHIL # BLD AUTO: 0.37 10*3/MM3 (ref 0–0.4)
EOSINOPHIL NFR BLD AUTO: 4.8 % (ref 0.3–6.2)
ERYTHROCYTE [DISTWIDTH] IN BLOOD BY AUTOMATED COUNT: 14.1 % (ref 12.3–15.4)
GFR SERPL CREATININE-BSD FRML MDRD: 53 ML/MIN/1.73
GLOBULIN UR ELPH-MCNC: 2.6 GM/DL
GLUCOSE SERPL-MCNC: 168 MG/DL (ref 65–99)
HCT VFR BLD AUTO: 38 % (ref 34–46.6)
HGB BLD-MCNC: 12.2 G/DL (ref 12–15.9)
IMM GRANULOCYTES # BLD AUTO: 0.01 10*3/MM3 (ref 0–0.05)
IMM GRANULOCYTES NFR BLD AUTO: 0.1 % (ref 0–0.5)
LYMPHOCYTES # BLD AUTO: 2.99 10*3/MM3 (ref 0.7–3.1)
LYMPHOCYTES NFR BLD AUTO: 38.9 % (ref 19.6–45.3)
MCH RBC QN AUTO: 28.6 PG (ref 26.6–33)
MCHC RBC AUTO-ENTMCNC: 32.1 G/DL (ref 31.5–35.7)
MCV RBC AUTO: 89.2 FL (ref 79–97)
MICROALBUMIN/CREAT UR: NORMAL MG/G{CREAT}
MONOCYTES # BLD AUTO: 0.55 10*3/MM3 (ref 0.1–0.9)
MONOCYTES NFR BLD AUTO: 7.2 % (ref 5–12)
NEUTROPHILS NFR BLD AUTO: 3.74 10*3/MM3 (ref 1.7–7)
NEUTROPHILS NFR BLD AUTO: 48.6 % (ref 42.7–76)
PLATELET # BLD AUTO: 329 10*3/MM3 (ref 140–450)
PMV BLD AUTO: 10.4 FL (ref 6–12)
POTASSIUM SERPL-SCNC: 4 MMOL/L (ref 3.5–5.2)
PROT SERPL-MCNC: 6.5 G/DL (ref 6–8.5)
PROT UR-MCNC: 9.5 MG/DL
PROT/CREAT UR: 0.1 MG/G{CREAT}
RBC # BLD AUTO: 4.26 10*6/MM3 (ref 3.77–5.28)
SODIUM SERPL-SCNC: 139 MMOL/L (ref 136–145)
WBC # BLD AUTO: 7.69 10*3/MM3 (ref 3.4–10.8)

## 2021-07-08 PROCEDURE — 84156 ASSAY OF PROTEIN URINE: CPT

## 2021-07-08 PROCEDURE — 85025 COMPLETE CBC W/AUTO DIFF WBC: CPT

## 2021-07-08 PROCEDURE — 36415 COLL VENOUS BLD VENIPUNCTURE: CPT

## 2021-07-08 PROCEDURE — 80053 COMPREHEN METABOLIC PANEL: CPT

## 2021-07-08 PROCEDURE — 82570 ASSAY OF URINE CREATININE: CPT

## 2021-07-08 PROCEDURE — 82043 UR ALBUMIN QUANTITATIVE: CPT

## 2021-07-12 ENCOUNTER — TELEPHONE (OUTPATIENT)
Dept: FAMILY MEDICINE CLINIC | Age: 66
End: 2021-07-12

## 2021-07-12 NOTE — TELEPHONE ENCOUNTER
Caller: NICOLE [facility]    Relationship: OTHER    Best call back number: [phone]       Which medication are you concerned about: RYBELSUS 7MG    What are your concerns: KEVIN FROM [facility] CALLED IN REGARDING THIS MEDICATION. SHE STATES THAT THIS PATIENT HAS BEEN WAITING TO GET THIS MEDICATION FILLED FOR 5 WEEKS NOW AND IS NEEDING A PRIOR AUTHORIZATION  ON THIS MEDICATION. KEVIN REQUESTS FOR THE PATIENT TO BE CALLED BACK.   [phone]    CORINA [address] HALLIE ORTIZ  - [phone]  - [phone]   [phone]

## 2021-07-13 ENCOUNTER — HOSPITAL ENCOUNTER (OUTPATIENT)
Dept: ULTRASOUND IMAGING | Facility: HOSPITAL | Age: 66
Discharge: HOME | End: 2021-07-13
Admitting: INTERNAL MEDICINE

## 2021-07-13 DIAGNOSIS — N28.1 RENAL CYST: ICD-10-CM

## 2021-07-13 PROCEDURE — 76775 US EXAM ABDO BACK WALL LIM: CPT

## 2021-07-30 DIAGNOSIS — N95.9 MENOPAUSAL PROBLEM: Primary | ICD-10-CM

## 2021-07-30 DIAGNOSIS — N95.9 MENOPAUSAL AND PERIMENOPAUSAL DISORDER: ICD-10-CM

## 2021-07-30 RX ORDER — CONJUGATED ESTROGENS 0.62 MG/G
0.5 CREAM VAGINAL TAKE AS DIRECTED
COMMUNITY
Start: 2021-06-30 | End: 2021-07-30 | Stop reason: SDUPTHER

## 2021-07-30 NOTE — TELEPHONE ENCOUNTER
Pharmacy requesting refill on Premarin Vaginal Cream, apply 0.5 gm topically as directed daily, 3 weeks on and 1 week off.     LAST OV: 5/25/21  NEXT OV: none  LAST REFILL: 5/19/21  MAMMO:

## 2021-07-31 RX ORDER — CONJUGATED ESTROGENS 0.62 MG/G
0.5 CREAM VAGINAL TAKE AS DIRECTED
Qty: 3 EACH | Refills: 0 | Status: SHIPPED | OUTPATIENT
Start: 2021-07-31 | End: 2022-05-09

## 2021-08-04 DIAGNOSIS — N95.9 MENOPAUSAL AND PERIMENOPAUSAL DISORDER: ICD-10-CM

## 2021-08-09 ENCOUNTER — TELEPHONE (OUTPATIENT)
Dept: FAMILY MEDICINE CLINIC | Age: 66
End: 2021-08-09

## 2021-08-09 RX ORDER — CONJUGATED ESTROGENS 0.62 MG/G
CREAM VAGINAL
Qty: 30 EACH | OUTPATIENT
Start: 2021-08-09

## 2021-08-09 NOTE — TELEPHONE ENCOUNTER
Pt said she has been on Rybelsus, it was increased to 7mg and she has taken it for 26 days now. It started out making her nauseated and then it went to vomiting daily. Please advise.

## 2021-08-09 NOTE — TELEPHONE ENCOUNTER
Caller: Yoli Epps    Relationship: Self    Best call back number: [phone]     What is the best time to reach you: ANYTIME     Who are you requesting to speak with (clinical staff, provider,  specific staff member): CLINICAL STAFF     Do you know the name of the person who called: YOLI EPPS     What was the call regarding: PATIENT IS NEEDING A DIFFERENT MEDICATION BECAUSE THE RYCELSUS IS MAKING HER VOMIT EVERYDAY. IT STARTED OUT FINE BUT NOW IT JUST NOT WORKING. SHE STOPPED TAKING IT YESTERDAY. PLEASE CALL AND ADVISE.     Do you require a callback:  YES

## 2021-08-10 RX ORDER — ORAL SEMAGLUTIDE 3 MG/1
3 TABLET ORAL DAILY
Qty: 90 TABLET | Refills: 0 | Status: SHIPPED | OUTPATIENT
Start: 2021-08-10 | End: 2021-11-08

## 2021-08-10 RX ORDER — ORAL SEMAGLUTIDE 3 MG/1
3 TABLET ORAL DAILY
COMMUNITY
Start: 2021-05-17 | End: 2021-08-10 | Stop reason: SDUPTHER

## 2021-08-19 ENCOUNTER — TELEPHONE (OUTPATIENT)
Dept: FAMILY MEDICINE CLINIC | Age: 66
End: 2021-08-19

## 2021-08-19 NOTE — TELEPHONE ENCOUNTER
Debo with [facility] called and said Rybelsus 3mg one daily needs a PA.  Have PA dept to call [phone], ref#- [id number].

## 2021-08-20 RX ORDER — BUPROPION HYDROCHLORIDE 300 MG/1
300 TABLET ORAL DAILY
Qty: 90 TABLET | Refills: 1 | Status: SHIPPED | OUTPATIENT
Start: 2021-08-20 | End: 2021-11-29 | Stop reason: SDUPTHER

## 2021-08-20 NOTE — TELEPHONE ENCOUNTER
Rx Refill Note  Requested Prescriptions     Pending Prescriptions Disp Refills   • buPROPion XL (WELLBUTRIN XL) 300 MG 24 hr tablet [Pharmacy Med Name: buPROPion HCL  MG TABLET] 90 tablet      Sig: TAKE ONE TABLET BY MOUTH DAILY      Last office visit with prescribing clinician: 5/25/21  Next office visit with prescribing clinician: 8/19/2021     {TIP  Is Refill Pharmacy correct?YES  Marcelle Pope  08/20/21, 10:18 EDT

## 2021-08-23 RX ORDER — SITAGLIPTIN 100 MG/1
TABLET, FILM COATED ORAL
Qty: 90 TABLET | Refills: 0 | Status: SHIPPED | OUTPATIENT
Start: 2021-08-23 | End: 2021-11-29 | Stop reason: SDUPTHER

## 2021-08-25 ENCOUNTER — TELEPHONE (OUTPATIENT)
Dept: FAMILY MEDICINE CLINIC | Age: 66
End: 2021-08-25

## 2021-08-25 NOTE — TELEPHONE ENCOUNTER
Caller: Guillremina Gardner    Relationship to patient: Self    Best call back number: [phone]     Patient is needing: PT CALLED WITH ISSUES GETTING HER RX FROM THE PHARMACY, SHE WOULD LIKE KATHARINA OR HER MA TO CALL HER, THANK YOU.    UNABLE TO WARM TRANSFER

## 2021-08-25 NOTE — TELEPHONE ENCOUNTER
Per [facility], pt insurance will only pay for 30 day supply every 720 days, med needs PA, do you want to PA or change med?

## 2021-08-26 ENCOUNTER — PRIOR AUTHORIZATION (OUTPATIENT)
Dept: FAMILY MEDICINE CLINIC | Age: 66
End: 2021-08-26

## 2021-09-01 ENCOUNTER — HOSPITAL ENCOUNTER (OUTPATIENT)
Dept: GENERAL RADIOLOGY | Facility: HOSPITAL | Age: 66
Discharge: HOME | End: 2021-09-01

## 2021-09-01 ENCOUNTER — OFFICE VISIT (OUTPATIENT)
Dept: FAMILY MEDICINE CLINIC | Age: 66
End: 2021-09-01

## 2021-09-01 VITALS
BODY MASS INDEX: 35.26 KG/M2 | DIASTOLIC BLOOD PRESSURE: 59 MMHG | WEIGHT: 207 LBS | HEART RATE: 71 BPM | SYSTOLIC BLOOD PRESSURE: 113 MMHG

## 2021-09-01 DIAGNOSIS — M54.42 CHRONIC LEFT-SIDED LOW BACK PAIN WITH LEFT-SIDED SCIATICA: ICD-10-CM

## 2021-09-01 DIAGNOSIS — G89.29 CHRONIC LEFT-SIDED LOW BACK PAIN WITH LEFT-SIDED SCIATICA: ICD-10-CM

## 2021-09-01 DIAGNOSIS — M25.552 HIP PAIN, CHRONIC, LEFT: ICD-10-CM

## 2021-09-01 DIAGNOSIS — M25.552 HIP PAIN, CHRONIC, LEFT: Primary | ICD-10-CM

## 2021-09-01 DIAGNOSIS — G89.29 HIP PAIN, CHRONIC, LEFT: ICD-10-CM

## 2021-09-01 DIAGNOSIS — G89.29 HIP PAIN, CHRONIC, LEFT: Primary | ICD-10-CM

## 2021-09-01 PROCEDURE — 72100 X-RAY EXAM L-S SPINE 2/3 VWS: CPT

## 2021-09-01 PROCEDURE — 73502 X-RAY EXAM HIP UNI 2-3 VIEWS: CPT

## 2021-09-01 PROCEDURE — 99213 OFFICE O/P EST LOW 20 MIN: CPT | Performed by: NURSE PRACTITIONER

## 2021-09-01 RX ORDER — TRIAMCINOLONE ACETONIDE 1 MG/G
1 CREAM TOPICAL TAKE AS DIRECTED
COMMUNITY
Start: 2021-05-25 | End: 2023-01-10

## 2021-09-01 RX ORDER — UBIDECARENONE 100 MG
1 CAPSULE ORAL DAILY
COMMUNITY

## 2021-09-01 RX ORDER — ATENOLOL 25 MG/1
25 TABLET ORAL DAILY
COMMUNITY
End: 2021-10-11

## 2021-09-01 RX ORDER — TIZANIDINE HYDROCHLORIDE 4 MG/1
4 CAPSULE, GELATIN COATED ORAL 3 TIMES DAILY
Qty: 60 CAPSULE | Refills: 1 | Status: SHIPPED | OUTPATIENT
Start: 2021-09-01

## 2021-09-01 RX ORDER — ALBUTEROL SULFATE 90 UG/1
2 AEROSOL, METERED RESPIRATORY (INHALATION) EVERY 6 HOURS PRN
COMMUNITY
End: 2022-04-04

## 2021-09-01 RX ORDER — LISINOPRIL 10 MG/1
10 TABLET ORAL DAILY
COMMUNITY
End: 2021-10-25

## 2021-09-01 RX ORDER — ROSUVASTATIN CALCIUM 10 MG/1
10 TABLET, COATED ORAL DAILY
COMMUNITY
End: 2021-11-29

## 2021-09-01 RX ORDER — HYDROCHLOROTHIAZIDE 25 MG/1
25 TABLET ORAL DAILY PRN
COMMUNITY
End: 2021-11-29 | Stop reason: SDUPTHER

## 2021-09-01 RX ORDER — TRAZODONE HYDROCHLORIDE 50 MG/1
50 TABLET ORAL NIGHTLY PRN
COMMUNITY
End: 2021-10-08 | Stop reason: SDUPTHER

## 2021-09-01 RX ORDER — MELATONIN
1000 DAILY
COMMUNITY

## 2021-09-01 RX ORDER — DULOXETIN HYDROCHLORIDE 60 MG/1
60 CAPSULE, DELAYED RELEASE ORAL 2 TIMES DAILY
COMMUNITY
End: 2021-11-29 | Stop reason: SDUPTHER

## 2021-09-01 RX ORDER — LEVOTHYROXINE SODIUM 112 MCG
112 TABLET ORAL EVERY MORNING
COMMUNITY
Start: 2021-08-15 | End: 2021-11-23

## 2021-09-01 NOTE — ASSESSMENT & PLAN NOTE
Continue muscle relaxer which she has, heat/ ice, and will make referral to PTA, will get a hip and lumbar x-ray

## 2021-09-01 NOTE — ASSESSMENT & PLAN NOTE
Her glucoses running over 150 in the am, she is still waiting on her insurance to approve Rybelsus dose, will work on getting that PA for her, continue to monitor sugars and work on her diet and exercise as well as take her other rx's as directed

## 2021-09-01 NOTE — PROGRESS NOTES
Guillermina Gardner presents to [facility].    Chief Complaint:  Hip Pain ((L) wants PT referral.)         History of Present Illness:  Back Pain  This is a recurrent problem. The current episode started 1 to 4 weeks ago. The problem occurs constantly. The problem has been waxing and waning since onset. The pain is present in the gluteal. The quality of the pain is described as aching, shooting and stabbing. The pain radiates to the left thigh. The pain is at a severity of 8/10. The pain is the same all the time. The symptoms are aggravated by position, standing, stress and twisting. Stiffness is present all day. Associated symptoms include leg pain and weakness. Pertinent negatives include no abdominal pain, bladder incontinence, bowel incontinence, chest pain, dysuria, fever, headaches, numbness, paresis, paresthesias, pelvic pain, perianal numbness, tingling or weight loss. Risk factors include obesity.       Review of Systems:  Review of Systems   Constitutional: Negative for fever and unexpected weight loss.   Cardiovascular: Negative for chest pain.   Gastrointestinal: Negative for abdominal pain and bowel incontinence.   Genitourinary: Negative for dysuria, pelvic pain and urinary incontinence.   Musculoskeletal: Positive for back pain.   Neurological: Positive for weakness. Negative for tingling, numbness and paresthesias.      No known injury, would like to go to PTA again, they have been helpful.      Vital Signs:   /59 (BP Location: Right arm, Patient Position: Sitting, Cuff Size: Large Adult)   Pulse 71   Wt 93.9 kg (207 lb)   BMI 35.26 kg/m²       Physical Exam:  Physical Exam  Vitals reviewed.   Constitutional:       General: She is not in acute distress.     Appearance: Normal appearance.   Neck:      Vascular: No carotid bruit.   Cardiovascular:      Rate and Rhythm: Normal rate and regular rhythm.      Heart sounds: Normal heart sounds. No murmur heard.      Pulmonary:      Effort: Pulmonary effort is normal. No respiratory distress.      Breath sounds: Normal breath sounds.   Musculoskeletal:      Comments: Pain with ROM Of back, no pain with ROM of left hip, pain over left lower lumbar and SI joint    Neurological:      Mental Status: She is alert.   Psychiatric:         Mood and Affect: Mood normal.         Behavior: Behavior normal.         Result Review      The following data was reviewed by: SHANNA Felix on 09/01/2021:    Results for orders placed or performed in visit on 07/08/21   Protein / Creatinine Ratio, Urine - Urine, Clean Catch    Specimen: Urine, Clean Catch   Result Value Ref Range    Creatinine, Urine 87.0 mg/dL    Total Protein, Urine 9.5 mg/dL    Protein/Creatinine Ratio, Urine 0.1    Comprehensive Metabolic Panel    Specimen: Blood   Result Value Ref Range    Glucose 168 (H) 65 - 99 mg/dL    BUN 14 8 - 23 mg/dL    Creatinine 1.04 (H) 0.57 - 1.00 mg/dL    Sodium 139 136 - 145 mmol/L    Potassium 4.0 3.5 - 5.2 mmol/L    Chloride 101 98 - 107 mmol/L    CO2 29.4 (H) 22.0 - 29.0 mmol/L    Calcium 9.2 8.6 - 10.5 mg/dL    Total Protein 6.5 6.0 - 8.5 g/dL    Albumin 3.90 3.50 - 5.20 g/dL    ALT (SGPT) 16 1 - 33 U/L    AST (SGOT) 14 1 - 32 U/L    Alkaline Phosphatase 123 (H) 39 - 117 U/L    Total Bilirubin 0.3 0.0 - 1.2 mg/dL    eGFR Non African Amer 53 (L) >60 mL/min/1.73    Globulin 2.6 gm/dL    A/G Ratio 1.5 g/dL    BUN/Creatinine Ratio 13.5 7.0 - 25.0    Anion Gap 8.6 5.0 - 15.0 mmol/L   MicroAlbumin, Urine, Random - Urine, Clean Catch    Specimen: Urine, Clean Catch   Result Value Ref Range    Microalbumin, Urine <1.2 mg/dL   Microalbumin / Creatinine Urine Ratio - Urine, Clean Catch    Specimen: Urine, Clean Catch   Result Value Ref Range    Microalbumin/Creatinine Ratio      Creatinine, Urine 70.9 mg/dL    Microalbumin, Urine <1.2 mg/dL   CBC Auto Differential    Specimen: Blood   Result Value Ref Range    WBC 7.69 3.40 - 10.80  10*3/mm3    RBC 4.26 3.77 - 5.28 10*6/mm3    Hemoglobin 12.2 12.0 - 15.9 g/dL    Hematocrit 38.0 34.0 - 46.6 %    MCV 89.2 79.0 - 97.0 fL    MCH 28.6 26.6 - 33.0 pg    MCHC 32.1 31.5 - 35.7 g/dL    RDW 14.1 12.3 - 15.4 %    RDW-SD 45.9 37.0 - 54.0 fl    MPV 10.4 6.0 - 12.0 fL    Platelets 329 140 - 450 10*3/mm3    Neutrophil % 48.6 42.7 - 76.0 %    Lymphocyte % 38.9 19.6 - 45.3 %    Monocyte % 7.2 5.0 - 12.0 %    Eosinophil % 4.8 0.3 - 6.2 %    Basophil % 0.4 0.0 - 1.5 %    Immature Grans % 0.1 0.0 - 0.5 %    Neutrophils, Absolute 3.74 1.70 - 7.00 10*3/mm3    Lymphocytes, Absolute 2.99 0.70 - 3.10 10*3/mm3    Monocytes, Absolute 0.55 0.10 - 0.90 10*3/mm3    Eosinophils, Absolute 0.37 0.00 - 0.40 10*3/mm3    Basophils, Absolute 0.03 0.00 - 0.20 10*3/mm3    Immature Grans, Absolute 0.01 0.00 - 0.05 10*3/mm3               Assessment and Plan:          Diagnoses and all orders for this visit:    1. Hip pain, chronic, left (Primary)  Assessment & Plan:  Checking x-ray of her left hip     Orders:  -     XR Hip With or Without Pelvis 2 - 3 View Left; Future  -     Ambulatory Referral to Physical Therapy  -     TiZANidine (ZANAFLEX) 4 MG capsule; Take 1 capsule by mouth 3 (Three) Times a Day.  Dispense: 60 capsule; Refill: 1    2. Chronic left-sided low back pain with left-sided sciatica  Assessment & Plan:  Continue muscle relaxer which she has, heat/ ice, and will make referral to PTA, will get a hip and lumbar x-ray    Orders:  -     XR Spine Lumbar 2 or 3 View; Future        Follow Up   Return if symptoms worsen or fail to improve.  Patient was given instructions and counseling regarding her condition or for health maintenance advice. Please see specific information pulled into the AVS if appropriate.       Answers for HPI/ROS submitted by the patient on 8/29/2021  What is the primary reason for your visit?: Back Pain

## 2021-10-04 ENCOUNTER — CLINICAL SUPPORT (OUTPATIENT)
Dept: FAMILY MEDICINE CLINIC | Age: 66
End: 2021-10-04

## 2021-10-04 DIAGNOSIS — Z23 NEED FOR INFLUENZA VACCINATION: Primary | ICD-10-CM

## 2021-10-04 PROCEDURE — G0008 ADMIN INFLUENZA VIRUS VAC: HCPCS | Performed by: FAMILY MEDICINE

## 2021-10-04 PROCEDURE — 90662 IIV NO PRSV INCREASED AG IM: CPT | Performed by: FAMILY MEDICINE

## 2021-10-08 DIAGNOSIS — G47.09 OTHER INSOMNIA: Primary | ICD-10-CM

## 2021-10-08 RX ORDER — TRAZODONE HYDROCHLORIDE 50 MG/1
50 TABLET ORAL NIGHTLY PRN
Qty: 90 TABLET | Refills: 0 | Status: SHIPPED | OUTPATIENT
Start: 2021-10-08 | End: 2022-01-10

## 2021-10-11 RX ORDER — ATENOLOL 25 MG/1
TABLET ORAL
Qty: 90 TABLET | Refills: 0 | Status: SHIPPED | OUTPATIENT
Start: 2021-10-11 | End: 2021-11-29 | Stop reason: SDUPTHER

## 2021-10-25 RX ORDER — LISINOPRIL 10 MG/1
TABLET ORAL
Qty: 90 TABLET | Refills: 0 | Status: SHIPPED | OUTPATIENT
Start: 2021-10-25 | End: 2021-11-29 | Stop reason: SDUPTHER

## 2021-11-11 DIAGNOSIS — E11.9 TYPE 2 DIABETES MELLITUS WITHOUT COMPLICATION, WITHOUT LONG-TERM CURRENT USE OF INSULIN (HCC): Primary | ICD-10-CM

## 2021-11-15 ENCOUNTER — OFFICE VISIT (OUTPATIENT)
Dept: FAMILY MEDICINE CLINIC | Age: 66
End: 2021-11-15

## 2021-11-15 VITALS
DIASTOLIC BLOOD PRESSURE: 59 MMHG | HEIGHT: 64 IN | WEIGHT: 206.6 LBS | SYSTOLIC BLOOD PRESSURE: 125 MMHG | OXYGEN SATURATION: 99 % | HEART RATE: 69 BPM | TEMPERATURE: 98.2 F | BODY MASS INDEX: 35.27 KG/M2

## 2021-11-15 DIAGNOSIS — J01.40 ACUTE NON-RECURRENT PANSINUSITIS: ICD-10-CM

## 2021-11-15 DIAGNOSIS — R05.9 COUGH: Primary | ICD-10-CM

## 2021-11-15 LAB
EXPIRATION DATE: NORMAL
FLUAV AG UPPER RESP QL IA.RAPID: NOT DETECTED
FLUBV AG UPPER RESP QL IA.RAPID: NOT DETECTED
INTERNAL CONTROL: NORMAL
Lab: NORMAL
SARS-COV-2 AG UPPER RESP QL IA.RAPID: NOT DETECTED

## 2021-11-15 PROCEDURE — 87428 SARSCOV & INF VIR A&B AG IA: CPT | Performed by: FAMILY MEDICINE

## 2021-11-15 PROCEDURE — 99213 OFFICE O/P EST LOW 20 MIN: CPT | Performed by: FAMILY MEDICINE

## 2021-11-15 RX ORDER — DEXTROMETHORPHAN HYDROBROMIDE AND PROMETHAZINE HYDROCHLORIDE 15; 6.25 MG/5ML; MG/5ML
5 SYRUP ORAL 4 TIMES DAILY PRN
Qty: 400 ML | Refills: 0 | Status: SHIPPED | OUTPATIENT
Start: 2021-11-15 | End: 2021-12-15

## 2021-11-15 RX ORDER — AMOXICILLIN AND CLAVULANATE POTASSIUM 875; 125 MG/1; MG/1
1 TABLET, FILM COATED ORAL 2 TIMES DAILY
Qty: 28 TABLET | Refills: 0 | Status: SHIPPED | OUTPATIENT
Start: 2021-11-15 | End: 2021-12-09

## 2021-11-15 NOTE — PROGRESS NOTES
Chief Complaint  Cough    Subjective          Guillermina Gardner presents to [facility]  History of Present Illness 66-year-old female, in the office today for an acute visit.  She has been complaining of cough, congestion and a sore scratchy throat.  She has been fighting this for the last 2 weeks.  Now she is starting to get some facial pain and sinus headaches and her over-the-counter preparations just were not really working other than maybe some cough syrup.    Review of Systems   Constitutional: Positive for fatigue. Negative for fever.   HENT: Positive for congestion, postnasal drip, rhinorrhea, sinus pressure and sore throat. Negative for ear discharge, ear pain, nosebleeds and sneezing.    Eyes: Negative.    Respiratory: Positive for cough and chest tightness. Negative for shortness of breath.    Cardiovascular: Negative.    Gastrointestinal: Negative.         Allergies   Allergen Reactions   • Tessalon [Benzonatate] Palpitations   • Methyldopa Itching       Current Outpatient Medications:   •  albuterol sulfate  (90 Base) MCG/ACT inhaler, Inhale 2 puffs Every 6 (Six) Hours As Needed., Disp: , Rfl:   •  atenolol (TENORMIN) 25 MG tablet, TAKE ONE TABLET BY MOUTH DAILY, Disp: 90 tablet, Rfl: 0  •  buPROPion XL (WELLBUTRIN XL) 300 MG 24 hr tablet, Take 1 tablet by mouth Daily., Disp: 90 tablet, Rfl: 1  •  cholecalciferol (Cholecalciferol) 25 MCG (1000 UT) tablet, Take 1,000 Units by mouth Daily., Disp: , Rfl:   •  coenzyme Q10 100 MG capsule, Take 1 tablet by mouth Daily., Disp: , Rfl:   •  DULoxetine (CYMBALTA) 60 MG capsule, Take 60 mg by mouth 2 (two) times a day., Disp: , Rfl:   •  hydroCHLOROthiazide (HYDRODIURIL) 25 MG tablet, Take 25 mg by mouth Daily As Needed., Disp: , Rfl:   •  Januvia 100 MG tablet, TAKE ONE TABLET BY MOUTH DAILY, Disp: 90 tablet, Rfl: 0  •  lisinopril (PRINIVIL,ZESTRIL) 10 MG tablet, TAKE ONE TABLET BY MOUTH DAILY, Disp: 90 tablet, Rfl: 0  •   "metFORMIN (GLUCOPHAGE) 500 MG tablet, TAKE TWO TABLETS BY MOUTH TWICE A DAY, Disp: 360 tablet, Rfl: 0  •  Premarin 0.625 MG/GM vaginal cream, Insert 0.5 g into the vagina Take As Directed. 3 weeks on and 1 week off, Disp: 3 each, Rfl: 0  •  rosuvastatin (CRESTOR) 10 MG tablet, Take 10 mg by mouth Daily., Disp: , Rfl:   •  Semaglutide,0.25 or 0.5MG/DOS, (OZEMPIC) 2 MG/1.5ML solution pen-injector, Inject 0.5 mg under the skin into the appropriate area as directed 1 (One) Time Per Week., Disp: 4 pen, Rfl: 2  •  SUMAtriptan (IMITREX) 50 MG tablet, Take one tablet at onset of headache. May repeat dose one time in 2 hours if headache not relieved. Max dose 200mg in 24 hrs, Disp: 9 tablet, Rfl: 2  •  Synthroid 112 MCG tablet, Take 112 mcg by mouth Every Morning., Disp: , Rfl:   •  TiZANidine (ZANAFLEX) 4 MG capsule, Take 1 capsule by mouth 3 (Three) Times a Day., Disp: 60 capsule, Rfl: 1  •  traZODone (DESYREL) 50 MG tablet, Take 1 tablet by mouth At Night As Needed for Sleep., Disp: 90 tablet, Rfl: 0  •  triamcinolone (KENALOG) 0.1 % cream, Apply 1 application topically to the appropriate area as directed Take As Directed., Disp: , Rfl:     Objective   Vital Signs:   /59 (BP Location: Left arm, Patient Position: Sitting)   Pulse 69   Temp 98.2 °F (36.8 °C) (Oral)   Ht 163.2 cm (64.25\")   Wt 93.7 kg (206 lb 9.6 oz)   SpO2 99% Comment: room air  BMI 35.19 kg/m²     Physical Exam  Constitutional:       Appearance: Normal appearance.   HENT:      Head: Normocephalic and atraumatic.      Nose: Congestion and rhinorrhea present.      Mouth/Throat:      Mouth: Mucous membranes are moist.      Pharynx: Oropharynx is clear. Posterior oropharyngeal erythema present. No oropharyngeal exudate.   Eyes:      Extraocular Movements: Extraocular movements intact.      Pupils: Pupils are equal, round, and reactive to light.   Cardiovascular:      Rate and Rhythm: Normal rate and regular rhythm.   Pulmonary:      Effort: " Pulmonary effort is normal.      Breath sounds: Normal breath sounds.   Abdominal:      General: Bowel sounds are normal.      Palpations: Abdomen is soft.   Musculoskeletal:      Cervical back: Normal range of motion and neck supple.   Skin:     General: Skin is warm and dry.      Capillary Refill: Capillary refill takes less than 2 seconds.   Neurological:      Mental Status: She is alert.        Result Review :   SARS Antigen   Date Value Ref Range Status   11/15/2021 Not Detected Not Detected Final     Influenza A Antigen LULU   Date Value Ref Range Status   11/15/2021 Not Detected  Final     Influenza B Antigen LULU   Date Value Ref Range Status   11/15/2021 Not Detected  Final               Assessment and Plan    Diagnoses and all orders for this visit:    1. Cough (Primary)  -     POCT SARS-CoV-2 Antigen LULU + Flu    2. Acute non-recurrent pansinusitis    Will call in a prescription for generic Augmentin that she will take for the next 2 weeks.  In addition I will send in some promethazine DM that she can take to help with the cough.  I advised her to rest and drink plenty of clear warm liquids.  She will keep her follow-up appointment as scheduled.  Of note, she says this morning after starting Ozempic last week, her morning blood sugar was down to 139 and she is quite pleased with how she feels on Ozempic to date.    Follow Up   No follow-ups on file.  Patient was given instructions and counseling regarding her condition or for health maintenance advice. Please see specific information pulled into the AVS if appropriate.

## 2021-11-22 NOTE — TELEPHONE ENCOUNTER
Rx Refill Note  Requested Prescriptions     Pending Prescriptions Disp Refills   • Synthroid 112 MCG tablet [Pharmacy Med Name: SYNTHROID 112 MCG TABLET] 90 tablet      Sig: TAKE ONE TABLET BY MOUTH DAILY      Last office visit with prescribing clinician: 9/1/2021      Next office visit with prescribing clinician: Visit date not found   Mary Fowler LPN  11/22/21, 15:23 EST     LF-4/26/21#90, RF-1    TSH-4/21/21    TSH  Order: [id number]   Status: Final result     Visible to patient: Yes (not seen)     Next appt: None          0 Result Notes    Component   Ref Range & Units 7 mo ago   (4/21/21) 1 yr ago   (9/30/20) 1 yr ago   (5/19/20) 1 yr ago   (2/18/20) 1 yr ago   (12/12/19) 2 yr ago   (6/17/19)   TSH   0.270 - 4.200 m[iU]/L 1.400

## 2021-11-23 RX ORDER — LEVOTHYROXINE SODIUM 112 MCG
TABLET ORAL
Qty: 90 TABLET | Refills: 0 | Status: SHIPPED | OUTPATIENT
Start: 2021-11-23 | End: 2022-03-07

## 2021-11-29 ENCOUNTER — LAB (OUTPATIENT)
Dept: LAB | Facility: HOSPITAL | Age: 66
End: 2021-11-29

## 2021-11-29 ENCOUNTER — OFFICE VISIT (OUTPATIENT)
Dept: FAMILY MEDICINE CLINIC | Age: 66
End: 2021-11-29

## 2021-11-29 VITALS
DIASTOLIC BLOOD PRESSURE: 67 MMHG | HEART RATE: 69 BPM | WEIGHT: 202.4 LBS | BODY MASS INDEX: 34.47 KG/M2 | SYSTOLIC BLOOD PRESSURE: 121 MMHG

## 2021-11-29 DIAGNOSIS — F41.3 OTHER MIXED ANXIETY DISORDERS: ICD-10-CM

## 2021-11-29 DIAGNOSIS — E03.9 HYPOTHYROIDISM (ACQUIRED): ICD-10-CM

## 2021-11-29 DIAGNOSIS — E11.9 TYPE 2 DIABETES MELLITUS WITHOUT COMPLICATION, WITHOUT LONG-TERM CURRENT USE OF INSULIN (HCC): Primary | ICD-10-CM

## 2021-11-29 DIAGNOSIS — M25.559 HIP PAIN: ICD-10-CM

## 2021-11-29 DIAGNOSIS — E78.2 MIXED HYPERLIPIDEMIA: ICD-10-CM

## 2021-11-29 DIAGNOSIS — I10 ESSENTIAL HYPERTENSION: ICD-10-CM

## 2021-11-29 DIAGNOSIS — E11.9 TYPE 2 DIABETES MELLITUS WITHOUT COMPLICATION, WITHOUT LONG-TERM CURRENT USE OF INSULIN (HCC): ICD-10-CM

## 2021-11-29 LAB
ALBUMIN SERPL-MCNC: 4.1 G/DL (ref 3.5–5.2)
ALBUMIN/GLOB SERPL: 1.5 G/DL
ALP SERPL-CCNC: 119 U/L (ref 39–117)
ALT SERPL W P-5'-P-CCNC: 13 U/L (ref 1–33)
ANION GAP SERPL CALCULATED.3IONS-SCNC: 6 MMOL/L (ref 5–15)
AST SERPL-CCNC: 17 U/L (ref 1–32)
BILIRUB SERPL-MCNC: 0.2 MG/DL (ref 0–1.2)
BUN SERPL-MCNC: 11 MG/DL (ref 8–23)
BUN/CREAT SERPL: 13.6 (ref 7–25)
CALCIUM SPEC-SCNC: 9.4 MG/DL (ref 8.6–10.5)
CHLORIDE SERPL-SCNC: 104 MMOL/L (ref 98–107)
CHOLEST SERPL-MCNC: 219 MG/DL (ref 0–200)
CO2 SERPL-SCNC: 28 MMOL/L (ref 22–29)
CREAT SERPL-MCNC: 0.81 MG/DL (ref 0.57–1)
GFR SERPL CREATININE-BSD FRML MDRD: 71 ML/MIN/1.73
GLOBULIN UR ELPH-MCNC: 2.7 GM/DL
GLUCOSE SERPL-MCNC: 136 MG/DL (ref 65–99)
HBA1C MFR BLD: 7.37 % (ref 4.8–5.6)
HDLC SERPL-MCNC: 60 MG/DL (ref 40–60)
LDLC SERPL CALC-MCNC: 140 MG/DL (ref 0–100)
LDLC/HDLC SERPL: 2.29 {RATIO}
POTASSIUM SERPL-SCNC: 4.1 MMOL/L (ref 3.5–5.2)
PROT SERPL-MCNC: 6.8 G/DL (ref 6–8.5)
SODIUM SERPL-SCNC: 138 MMOL/L (ref 136–145)
TRIGL SERPL-MCNC: 108 MG/DL (ref 0–150)
TSH SERPL DL<=0.05 MIU/L-ACNC: 2.6 UIU/ML (ref 0.27–4.2)
VLDLC SERPL-MCNC: 19 MG/DL (ref 5–40)

## 2021-11-29 PROCEDURE — 80053 COMPREHEN METABOLIC PANEL: CPT

## 2021-11-29 PROCEDURE — 84443 ASSAY THYROID STIM HORMONE: CPT

## 2021-11-29 PROCEDURE — 83036 HEMOGLOBIN GLYCOSYLATED A1C: CPT

## 2021-11-29 PROCEDURE — 80061 LIPID PANEL: CPT

## 2021-11-29 PROCEDURE — 36415 COLL VENOUS BLD VENIPUNCTURE: CPT

## 2021-11-29 PROCEDURE — 99214 OFFICE O/P EST MOD 30 MIN: CPT | Performed by: NURSE PRACTITIONER

## 2021-11-29 RX ORDER — ATENOLOL 25 MG/1
25 TABLET ORAL DAILY
Qty: 90 TABLET | Refills: 1 | Status: SHIPPED | OUTPATIENT
Start: 2021-11-29 | End: 2022-07-18

## 2021-11-29 RX ORDER — DULOXETIN HYDROCHLORIDE 60 MG/1
60 CAPSULE, DELAYED RELEASE ORAL 2 TIMES DAILY
Qty: 180 CAPSULE | Refills: 1 | Status: SHIPPED | OUTPATIENT
Start: 2021-11-29 | End: 2022-05-31

## 2021-11-29 RX ORDER — SITAGLIPTIN 100 MG/1
TABLET, FILM COATED ORAL
Qty: 90 TABLET | Refills: 0 | OUTPATIENT
Start: 2021-11-29

## 2021-11-29 RX ORDER — HYDROCHLOROTHIAZIDE 25 MG/1
25 TABLET ORAL DAILY
Qty: 90 TABLET | Refills: 1 | Status: SHIPPED | OUTPATIENT
Start: 2021-11-29 | End: 2022-05-25

## 2021-11-29 RX ORDER — LISINOPRIL 10 MG/1
10 TABLET ORAL DAILY
Qty: 90 TABLET | Refills: 1 | Status: SHIPPED | OUTPATIENT
Start: 2021-11-29 | End: 2022-08-22

## 2021-11-29 RX ORDER — BUPROPION HYDROCHLORIDE 300 MG/1
300 TABLET ORAL DAILY
Qty: 90 TABLET | Refills: 1 | Status: SHIPPED | OUTPATIENT
Start: 2021-11-29 | End: 2022-08-17

## 2021-11-29 NOTE — ASSESSMENT & PLAN NOTE
Rechecking labs, to continue efforts with diet, weight loss and regular exercise; discussed trying Crestor three days a week, consider cardiology consult

## 2021-11-29 NOTE — ASSESSMENT & PLAN NOTE
Discussed shingles vaccines, considering; checking labs, she sees neph and has lab orders but not seeing him until 1-2022

## 2021-11-29 NOTE — PROGRESS NOTES
Guillermina Gardner presents to [facility].    Chief Complaint:  Diabetes and Hypertension         History of Present Illness:  Diabetes:  Current medication Januvia, ozempic and metformin   Tolerating medication: Yes  Last eye exam summer 2021  Last foot exam 4-2021  At home BS ranges: *120-150 now, but before ozempic, was up to 180   Lab Results       Component                Value               Date                       HGBA1C                   7.3 (H)             04/21/2021                Hypertension:  Current medication Lisinopril, HCTZ  Tolerating Medication: Yes  Needs refills: Yes  Labs   Lab Results       Component                Value               Date                       GLUCOSE                  168 (H)             07/08/2021                 BUN                      14                  07/08/2021                 CREATININE               1.04 (H)            07/08/2021                 EGFRIFNONA               53 (L)              07/08/2021                 BCR                      13.5                07/08/2021                 K                        4.0                 07/08/2021                 CO2                      29.4 (H)            07/08/2021                 CALCIUM                  9.2                 07/08/2021                 ALBUMIN                  3.90                07/08/2021                 LABIL2                   1.4                 04/21/2021                 AST                      14                  07/08/2021                 ALT                      16                  07/08/2021              Hypothyroidism  Current rx synthroid  112 mcg   Tolerating rx Yes   Refills needed No/ [facility]   Lab Results       Component                Value               Date                       TSH                      1.400               04/21/2021              Anxiety/ Depression  Current medication/Cymbalta, wellbutrin   Tolerating medication Yes  Current symptoms:  rx helping     Hyperlipidemia  Current medication was on crestor daily, had N,V so stopped   Tolerating medication: No (now on red yeast )   Lab Results       Component                Value               Date                       CHLPL                    148                 2021                 TRIG                     92                  2021                 HDL                      65 (H)              2021                 LDL                      65 (L)              2021                      PAST MEDICAL HISTORY changes since her last visit: none         Dizziness - Seeing Dr. Almaguer         GYNECOLOGICAL HISTORY:             PREVENTIVE HEALTH MAINTENANCE             Hepatitis C Medicare Screening: was last done 3-17-18; negative     MAMMOGRAM: Done within last 2 years and results in are chart was last done 01/15/21 stable         Surgical History:         : X 2;     Lap Band ; Procedures: Ablation     Positive for    thumb joint left replaced/repaired 8-6-15; and    right thumb replaced 18;;     COLONOSCOPY: was last done 2016 with normal results Dr Rosa Forte         Family History:     Father:  at age 89;  Type 2 Diabetes; Hypothyroidism;  Macular degeneration     Mother:  at age 90;  Hypertension;  Dementia     Sister(s): Hypertension; Endocrine Hypothyroidism         Social History:     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   Respiratory: Negative for cough and shortness of breath.    Cardiovascular: Negative for chest pain, palpitations and leg swelling.   Musculoskeletal:        Right buttocks >L pain, going to PT helps    Neurological: Negative for numbness.          Vital Signs:   /67 (BP Location: Left arm, Patient Position: Sitting)   Pulse 69   Wt  91.8 kg (202 lb 6.4 oz)   BMI 34.47 kg/m²       Physical Exam:  Physical Exam  Vitals reviewed.   Constitutional:       General: She is not in acute distress.     Appearance: Normal appearance.   Neck:      Vascular: No carotid bruit.   Cardiovascular:      Rate and Rhythm: Normal rate and regular rhythm.      Heart sounds: Normal heart sounds. No murmur heard.      Pulmonary:      Effort: Pulmonary effort is normal. No respiratory distress.      Breath sounds: Normal breath sounds.   Musculoskeletal:      Right lower leg: No edema.      Left lower leg: No edema.   Neurological:      Mental Status: She is alert.   Psychiatric:         Mood and Affect: Mood normal.         Behavior: Behavior normal.         Result Review      The following data was reviewed by: SHANNA Felix on 11/29/2021:    Results for orders placed or performed in visit on 11/15/21   POCT SARS-CoV-2 Antigen LULU + Flu    Specimen: Swab   Result Value Ref Range    SARS Antigen Not Detected Not Detected    Influenza A Antigen LULU Not Detected     Influenza B Antigen LULU Not Detected     Internal Control Passed Passed    Lot Number [id number]     Expiration Date 12/16/2022                Assessment and Plan:          Diagnoses and all orders for this visit:    1. Type 2 diabetes mellitus without complication, without long-term current use of insulin (HCC) (Primary)  Assessment & Plan:  Discussed shingles vaccines, considering; checking labs, she sees neph and has lab orders but not seeing him until 1-2022    Orders:  -     Comprehensive Metabolic Panel; Future  -     Lipid Panel; Future  -     Hemoglobin A1c; Future  -     metFORMIN (GLUCOPHAGE) 500 MG tablet; Take 2 tablets by mouth 2 (Two) Times a Day.  Dispense: 360 tablet; Refill: 1  -     SITagliptin (Januvia) 100 MG tablet; Take 1 tablet by mouth Daily.  Dispense: 90 tablet; Refill: 1    2. Hypothyroidism (acquired)  -     TSH; Future    3. Essential hypertension  -     lisinopril  (PRINIVIL,ZESTRIL) 10 MG tablet; Take 1 tablet by mouth Daily.  Dispense: 90 tablet; Refill: 1  -     hydroCHLOROthiazide (HYDRODIURIL) 25 MG tablet; Take 1 tablet by mouth Daily.  Dispense: 90 tablet; Refill: 1  -     atenolol (TENORMIN) 25 MG tablet; Take 1 tablet by mouth Daily.  Dispense: 90 tablet; Refill: 1    4. Other mixed anxiety disorders  -     DULoxetine (CYMBALTA) 60 MG capsule; Take 1 capsule by mouth 2 (Two) Times a Day.  Dispense: 180 capsule; Refill: 1  -     buPROPion XL (WELLBUTRIN XL) 300 MG 24 hr tablet; Take 1 tablet by mouth Daily.  Dispense: 90 tablet; Refill: 1    5. Mixed hyperlipidemia  Assessment & Plan:  Rechecking labs, to continue efforts with diet, weight loss and regular exercise; discussed trying Crestor three days a week, consider cardiology consult       6. Hip pain        Follow Up   No follow-ups on file.  Patient was given instructions and counseling regarding her condition or for health maintenance advice. Please see specific information pulled into the AVS if appropriate.

## 2021-11-30 RX ORDER — FLUCONAZOLE 150 MG/1
TABLET ORAL
Qty: 2 TABLET | Refills: 0 | Status: SHIPPED | OUTPATIENT
Start: 2021-11-30 | End: 2021-12-09

## 2021-12-09 ENCOUNTER — OFFICE VISIT (OUTPATIENT)
Dept: FAMILY MEDICINE CLINIC | Age: 66
End: 2021-12-09

## 2021-12-09 VITALS
TEMPERATURE: 98.8 F | OXYGEN SATURATION: 99 % | DIASTOLIC BLOOD PRESSURE: 74 MMHG | HEART RATE: 79 BPM | BODY MASS INDEX: 33.55 KG/M2 | WEIGHT: 197 LBS | SYSTOLIC BLOOD PRESSURE: 134 MMHG

## 2021-12-09 DIAGNOSIS — R05.9 COUGH: ICD-10-CM

## 2021-12-09 DIAGNOSIS — J01.01 ACUTE RECURRENT MAXILLARY SINUSITIS: Primary | ICD-10-CM

## 2021-12-09 PROCEDURE — 99213 OFFICE O/P EST LOW 20 MIN: CPT | Performed by: NURSE PRACTITIONER

## 2021-12-09 RX ORDER — AMOXICILLIN AND CLAVULANATE POTASSIUM 875; 125 MG/1; MG/1
1 TABLET, FILM COATED ORAL 2 TIMES DAILY
Qty: 28 TABLET | Refills: 0 | Status: SHIPPED | OUTPATIENT
Start: 2021-12-09 | End: 2022-04-04

## 2021-12-09 RX ORDER — GUAIFENESIN AND CODEINE PHOSPHATE 100; 10 MG/5ML; MG/5ML
SOLUTION ORAL
Qty: 118 ML | Refills: 0 | Status: SHIPPED | OUTPATIENT
Start: 2021-12-09 | End: 2021-12-09

## 2021-12-09 RX ORDER — GUAIFENESIN AND CODEINE PHOSPHATE 100; 10 MG/5ML; MG/5ML
SOLUTION ORAL
Qty: 118 ML | Refills: 0 | Status: SHIPPED | OUTPATIENT
Start: 2021-12-09 | End: 2023-01-10

## 2021-12-09 NOTE — PROGRESS NOTES
Guillermina Gardner presents to [facility].    Chief Complaint:  Cough (seen Dr Lima on 11/15, finished antibiotic)         History of Present Illness:  URI  When did symptoms start: 1 month ago   Any exposures:none / was neg for covid and flu last month, imm for covid UTD   Symptoms: dry cough, thick PND, frontal sinus headache  Treatment tried:ATB augmentin and phenergan DM.   Improved, no green sputum/ sinus drainage   Would like a rx for Jesús AC to take at HS    History of DM, glucose 119 this am.            Review of Systems:  Review of Systems   Constitutional: Negative for fever.   HENT: Positive for sore throat (irritated throat ).    Respiratory: Negative for shortness of breath.    Cardiovascular: Negative for chest pain.   Neurological:        No loss of taste or smell           Current Outpatient Medications:   •  albuterol sulfate  (90 Base) MCG/ACT inhaler, Inhale 2 puffs Every 6 (Six) Hours As Needed., Disp: , Rfl:   •  amoxicillin-clavulanate (Augmentin) 875-125 MG per tablet, Take 1 tablet by mouth 2 (Two) Times a Day., Disp: 28 tablet, Rfl: 0  •  atenolol (TENORMIN) 25 MG tablet, Take 1 tablet by mouth Daily., Disp: 90 tablet, Rfl: 1  •  buPROPion XL (WELLBUTRIN XL) 300 MG 24 hr tablet, Take 1 tablet by mouth Daily., Disp: 90 tablet, Rfl: 1  •  cholecalciferol (Cholecalciferol) 25 MCG (1000 UT) tablet, Take 1,000 Units by mouth Daily., Disp: , Rfl:   •  coenzyme Q10 100 MG capsule, Take 1 tablet by mouth Daily., Disp: , Rfl:   •  DULoxetine (CYMBALTA) 60 MG capsule, Take 1 capsule by mouth 2 (Two) Times a Day., Disp: 180 capsule, Rfl: 1  •  guaiFENesin-codeine (GUAIFENESIN AC) 100-10 MG/5ML liquid, 5 mL at HS prn, Disp: 118 mL, Rfl: 0  •  hydroCHLOROthiazide (HYDRODIURIL) 25 MG tablet, Take 1 tablet by mouth Daily., Disp: 90 tablet, Rfl: 1  •  lisinopril (PRINIVIL,ZESTRIL) 10 MG tablet, Take 1 tablet by mouth Daily., Disp: 90 tablet, Rfl: 1  •  metFORMIN  (GLUCOPHAGE) 500 MG tablet, Take 2 tablets by mouth 2 (Two) Times a Day., Disp: 360 tablet, Rfl: 1  •  Premarin 0.625 MG/GM vaginal cream, Insert 0.5 g into the vagina Take As Directed. 3 weeks on and 1 week off, Disp: 3 each, Rfl: 0  •  promethazine-dextromethorphan (PROMETHAZINE-DM) 6.25-15 MG/5ML syrup, Take 5 mL by mouth 4 (Four) Times a Day As Needed for Cough for up to 30 days., Disp: 400 mL, Rfl: 0  •  Semaglutide,0.25 or 0.5MG/DOS, (OZEMPIC) 2 MG/1.5ML solution pen-injector, Inject 0.5 mg under the skin into the appropriate area as directed 1 (One) Time Per Week., Disp: 4 pen, Rfl: 2  •  SITagliptin (Januvia) 100 MG tablet, Take 1 tablet by mouth Daily., Disp: 90 tablet, Rfl: 1  •  SUMAtriptan (IMITREX) 50 MG tablet, Take one tablet at onset of headache. May repeat dose one time in 2 hours if headache not relieved. Max dose 200mg in 24 hrs, Disp: 9 tablet, Rfl: 2  •  Synthroid 112 MCG tablet, TAKE ONE TABLET BY MOUTH DAILY, Disp: 90 tablet, Rfl: 0  •  TiZANidine (ZANAFLEX) 4 MG capsule, Take 1 capsule by mouth 3 (Three) Times a Day., Disp: 60 capsule, Rfl: 1  •  traZODone (DESYREL) 50 MG tablet, Take 1 tablet by mouth At Night As Needed for Sleep., Disp: 90 tablet, Rfl: 0  •  triamcinolone (KENALOG) 0.1 % cream, Apply 1 application topically to the appropriate area as directed Take As Directed., Disp: , Rfl:     Vital Signs:   Vitals:    12/09/21 1053   BP: 134/74   BP Location: Left arm   Patient Position: Sitting   Pulse: 79   Temp: 98.8 °F (37.1 °C)   TempSrc: Oral   SpO2: 99%   Weight: 89.4 kg (197 lb)         Physical Exam:  Physical Exam  Constitutional:       General: She is not in acute distress.     Appearance: Normal appearance.   HENT:      Right Ear: Tympanic membrane, ear canal and external ear normal.      Left Ear: Tympanic membrane, ear canal and external ear normal.      Nose: Nose normal.      Comments: Bilateral frontal and maxillary sinus tenderness      Mouth/Throat:      Pharynx:  Oropharynx is clear. No posterior oropharyngeal erythema.   Cardiovascular:      Rate and Rhythm: Normal rate and regular rhythm.      Heart sounds: No murmur heard.      Pulmonary:      Effort: Pulmonary effort is normal.      Breath sounds: Normal breath sounds.   Lymphadenopathy:      Cervical: No cervical adenopathy.   Neurological:      Mental Status: She is alert.   Psychiatric:         Mood and Affect: Mood normal.         Behavior: Behavior normal.         Result Review      The following data was reviewed by: SHANNA Felix on 12/09/2021:    Results for orders placed or performed in visit on 11/29/21   TSH    Specimen: Blood   Result Value Ref Range    TSH 2.600 0.270 - 4.200 uIU/mL   Comprehensive Metabolic Panel    Specimen: Blood   Result Value Ref Range    Glucose 136 (H) 65 - 99 mg/dL    BUN 11 8 - 23 mg/dL    Creatinine 0.81 0.57 - 1.00 mg/dL    Sodium 138 136 - 145 mmol/L    Potassium 4.1 3.5 - 5.2 mmol/L    Chloride 104 98 - 107 mmol/L    CO2 28.0 22.0 - 29.0 mmol/L    Calcium 9.4 8.6 - 10.5 mg/dL    Total Protein 6.8 6.0 - 8.5 g/dL    Albumin 4.10 3.50 - 5.20 g/dL    ALT (SGPT) 13 1 - 33 U/L    AST (SGOT) 17 1 - 32 U/L    Alkaline Phosphatase 119 (H) 39 - 117 U/L    Total Bilirubin 0.2 0.0 - 1.2 mg/dL    eGFR Non African Amer 71 >60 mL/min/1.73    Globulin 2.7 gm/dL    A/G Ratio 1.5 g/dL    BUN/Creatinine Ratio 13.6 7.0 - 25.0    Anion Gap 6.0 5.0 - 15.0 mmol/L   Lipid Panel    Specimen: Blood   Result Value Ref Range    Total Cholesterol 219 (H) 0 - 200 mg/dL    Triglycerides 108 0 - 150 mg/dL    HDL Cholesterol 60 40 - 60 mg/dL    LDL Cholesterol  140 (H) 0 - 100 mg/dL    VLDL Cholesterol 19 5 - 40 mg/dL    LDL/HDL Ratio 2.29    Hemoglobin A1c    Specimen: Blood   Result Value Ref Range    Hemoglobin A1C 7.37 (H) 4.80 - 5.60 %               Assessment and Plan:          Diagnoses and all orders for this visit:    1. Acute recurrent maxillary sinusitis (Primary)  Assessment &  Plan:  Rest, increase fluids, follow up if symptoms progress or change       Orders:  -     amoxicillin-clavulanate (Augmentin) 875-125 MG per tablet; Take 1 tablet by mouth 2 (Two) Times a Day.  Dispense: 28 tablet; Refill: 0    2. Cough  -     Discontinue: guaiFENesin-codeine (GUAIFENESIN AC) 100-10 MG/5ML liquid; 5 mL at HS prn  Dispense: 118 mL; Refill: 0  -     guaiFENesin-codeine (GUAIFENESIN AC) 100-10 MG/5ML liquid; 5 mL at HS prn  Dispense: 118 mL; Refill: 0        Follow Up   Return if symptoms worsen or fail to improve.  Patient was given instructions and counseling regarding her condition or for health maintenance advice. Please see specific information pulled into the AVS if appropriate.

## 2022-01-03 ENCOUNTER — LAB (OUTPATIENT)
Dept: LAB | Facility: HOSPITAL | Age: 67
End: 2022-01-03

## 2022-01-03 ENCOUNTER — TRANSCRIBE ORDERS (OUTPATIENT)
Dept: ADMINISTRATIVE | Facility: HOSPITAL | Age: 67
End: 2022-01-03

## 2022-01-03 DIAGNOSIS — E11.9 DIABETES MELLITUS WITHOUT COMPLICATION: ICD-10-CM

## 2022-01-03 DIAGNOSIS — N18.30 STAGE 3 CHRONIC KIDNEY DISEASE, UNSPECIFIED WHETHER STAGE 3A OR 3B CKD: ICD-10-CM

## 2022-01-03 DIAGNOSIS — N18.30 STAGE 3 CHRONIC KIDNEY DISEASE, UNSPECIFIED WHETHER STAGE 3A OR 3B CKD: Primary | ICD-10-CM

## 2022-01-03 DIAGNOSIS — I10 HYPERTENSION, ESSENTIAL: ICD-10-CM

## 2022-01-03 DIAGNOSIS — N28.1 RENAL CYST: ICD-10-CM

## 2022-01-03 LAB
ALBUMIN SERPL-MCNC: 4.1 G/DL (ref 3.5–5.2)
ALBUMIN UR-MCNC: 2 MG/DL
ALBUMIN/GLOB SERPL: 1.7 G/DL
ALP SERPL-CCNC: 98 U/L (ref 39–117)
ALT SERPL W P-5'-P-CCNC: 12 U/L (ref 1–33)
ANION GAP SERPL CALCULATED.3IONS-SCNC: 9.8 MMOL/L (ref 5–15)
AST SERPL-CCNC: 16 U/L (ref 1–32)
BACTERIA UR QL AUTO: ABNORMAL /HPF
BASOPHILS # BLD AUTO: 0.02 10*3/MM3 (ref 0–0.2)
BASOPHILS NFR BLD AUTO: 0.3 % (ref 0–1.5)
BILIRUB SERPL-MCNC: 0.4 MG/DL (ref 0–1.2)
BILIRUB UR QL STRIP: NEGATIVE
BUN SERPL-MCNC: 14 MG/DL (ref 8–23)
BUN/CREAT SERPL: 15.4 (ref 7–25)
CALCIUM SPEC-SCNC: 9.7 MG/DL (ref 8.6–10.5)
CHLORIDE SERPL-SCNC: 98 MMOL/L (ref 98–107)
CLARITY UR: ABNORMAL
CO2 SERPL-SCNC: 29.2 MMOL/L (ref 22–29)
COLOR UR: YELLOW
CREAT SERPL-MCNC: 0.91 MG/DL (ref 0.57–1)
CREAT UR-MCNC: 151.1 MG/DL
CREAT UR-MCNC: 178.4 MG/DL
DEPRECATED RDW RBC AUTO: 46.8 FL (ref 37–54)
EOSINOPHIL # BLD AUTO: 0.13 10*3/MM3 (ref 0–0.4)
EOSINOPHIL NFR BLD AUTO: 2.1 % (ref 0.3–6.2)
ERYTHROCYTE [DISTWIDTH] IN BLOOD BY AUTOMATED COUNT: 14.4 % (ref 12.3–15.4)
GFR SERPL CREATININE-BSD FRML MDRD: 62 ML/MIN/1.73
GLOBULIN UR ELPH-MCNC: 2.4 GM/DL
GLUCOSE SERPL-MCNC: 170 MG/DL (ref 65–99)
GLUCOSE UR STRIP-MCNC: NEGATIVE MG/DL
HBA1C MFR BLD: 7.59 % (ref 4.8–5.6)
HCT VFR BLD AUTO: 39.1 % (ref 34–46.6)
HGB BLD-MCNC: 12.6 G/DL (ref 12–15.9)
HGB UR QL STRIP.AUTO: NEGATIVE
IMM GRANULOCYTES # BLD AUTO: 0 10*3/MM3 (ref 0–0.05)
IMM GRANULOCYTES NFR BLD AUTO: 0 % (ref 0–0.5)
KETONES UR QL STRIP: NEGATIVE
LEUKOCYTE ESTERASE UR QL STRIP.AUTO: ABNORMAL
LYMPHOCYTES # BLD AUTO: 2.14 10*3/MM3 (ref 0.7–3.1)
LYMPHOCYTES NFR BLD AUTO: 35 % (ref 19.6–45.3)
MCH RBC QN AUTO: 28.2 PG (ref 26.6–33)
MCHC RBC AUTO-ENTMCNC: 32.2 G/DL (ref 31.5–35.7)
MCV RBC AUTO: 87.5 FL (ref 79–97)
MICROALBUMIN/CREAT UR: 13.2 MG/G
MONOCYTES # BLD AUTO: 0.47 10*3/MM3 (ref 0.1–0.9)
MONOCYTES NFR BLD AUTO: 7.7 % (ref 5–12)
NEUTROPHILS NFR BLD AUTO: 3.36 10*3/MM3 (ref 1.7–7)
NEUTROPHILS NFR BLD AUTO: 54.9 % (ref 42.7–76)
NITRITE UR QL STRIP: NEGATIVE
PH UR STRIP.AUTO: 5.5 [PH] (ref 5–8)
PLATELET # BLD AUTO: 326 10*3/MM3 (ref 140–450)
PMV BLD AUTO: 10.2 FL (ref 6–12)
POTASSIUM SERPL-SCNC: 3.8 MMOL/L (ref 3.5–5.2)
PROT ?TM UR-MCNC: 14.8 MG/DL
PROT SERPL-MCNC: 6.5 G/DL (ref 6–8.5)
PROT UR QL STRIP: NEGATIVE
PROT/CREAT UR: 0.1 MG/G{CREAT}
RBC # BLD AUTO: 4.47 10*6/MM3 (ref 3.77–5.28)
RBC # UR STRIP: ABNORMAL /HPF
REF LAB TEST METHOD: ABNORMAL
SODIUM SERPL-SCNC: 137 MMOL/L (ref 136–145)
SP GR UR STRIP: 1.02 (ref 1–1.03)
SQUAMOUS #/AREA URNS HPF: ABNORMAL /HPF
UROBILINOGEN UR QL STRIP: ABNORMAL
WBC # UR STRIP: ABNORMAL /HPF
WBC NRBC COR # BLD: 6.12 10*3/MM3 (ref 3.4–10.8)

## 2022-01-03 PROCEDURE — 84156 ASSAY OF PROTEIN URINE: CPT

## 2022-01-03 PROCEDURE — 80053 COMPREHEN METABOLIC PANEL: CPT

## 2022-01-03 PROCEDURE — 81001 URINALYSIS AUTO W/SCOPE: CPT

## 2022-01-03 PROCEDURE — 83036 HEMOGLOBIN GLYCOSYLATED A1C: CPT

## 2022-01-03 PROCEDURE — 85025 COMPLETE CBC W/AUTO DIFF WBC: CPT

## 2022-01-03 PROCEDURE — 82043 UR ALBUMIN QUANTITATIVE: CPT

## 2022-01-03 PROCEDURE — 36415 COLL VENOUS BLD VENIPUNCTURE: CPT

## 2022-01-03 PROCEDURE — 82570 ASSAY OF URINE CREATININE: CPT

## 2022-01-08 DIAGNOSIS — G47.09 OTHER INSOMNIA: ICD-10-CM

## 2022-01-10 ENCOUNTER — TRANSCRIBE ORDERS (OUTPATIENT)
Dept: ADMINISTRATIVE | Facility: HOSPITAL | Age: 67
End: 2022-01-10

## 2022-01-10 DIAGNOSIS — E11.22 CKD STAGE 3 SECONDARY TO DIABETES: Primary | ICD-10-CM

## 2022-01-10 DIAGNOSIS — N18.30 CKD STAGE 3 SECONDARY TO DIABETES: Primary | ICD-10-CM

## 2022-01-10 RX ORDER — TRAZODONE HYDROCHLORIDE 50 MG/1
TABLET ORAL
Qty: 90 TABLET | Refills: 0 | Status: SHIPPED | OUTPATIENT
Start: 2022-01-10 | End: 2022-04-11

## 2022-01-19 ENCOUNTER — HOSPITAL ENCOUNTER (OUTPATIENT)
Dept: ULTRASOUND IMAGING | Facility: HOSPITAL | Age: 67
Discharge: HOME | End: 2022-01-19
Admitting: INTERNAL MEDICINE

## 2022-01-19 DIAGNOSIS — N18.30 CKD STAGE 3 SECONDARY TO DIABETES: ICD-10-CM

## 2022-01-19 DIAGNOSIS — E11.22 CKD STAGE 3 SECONDARY TO DIABETES: ICD-10-CM

## 2022-01-19 PROCEDURE — 76775 US EXAM ABDO BACK WALL LIM: CPT

## 2022-01-25 DIAGNOSIS — E78.2 MIXED HYPERLIPIDEMIA: Primary | ICD-10-CM

## 2022-01-25 RX ORDER — ROSUVASTATIN CALCIUM 5 MG/1
5 TABLET, COATED ORAL DAILY
Qty: 30 TABLET | Refills: 1 | Status: SHIPPED | OUTPATIENT
Start: 2022-01-25 | End: 2022-03-29

## 2022-02-01 RX ORDER — SUMATRIPTAN 50 MG/1
TABLET, FILM COATED ORAL
Qty: 9 TABLET | Refills: 2 | Status: SHIPPED | OUTPATIENT
Start: 2022-02-01 | End: 2022-05-10 | Stop reason: SDUPTHER

## 2022-02-01 NOTE — TELEPHONE ENCOUNTER
Rx Refill Note  Requested Prescriptions     Pending Prescriptions Disp Refills   • SUMAtriptan (IMITREX) 50 MG tablet [Pharmacy Med Name: SUMAtriptan SUCC 50 MG TABLET] 9 tablet 2     Sig: TAKE ONE TABLET BY MOUTH AT ONSET OF HEADACHE; MAY REPEAT ONE TABLET IN 2 HOURS IF NEEDED. MAX DOSE 200 MG IN 24 HOURS      Last office visit with prescribing clinician: 12/9/2021      Next office visit with prescribing clinician: Visit date not found   Mary Fowler LPN  02/01/22, 16:03 EST     -6/16/21#9, RF-2

## 2022-02-04 DIAGNOSIS — E11.9 TYPE 2 DIABETES MELLITUS WITHOUT COMPLICATION, WITHOUT LONG-TERM CURRENT USE OF INSULIN: ICD-10-CM

## 2022-02-07 NOTE — TELEPHONE ENCOUNTER
Rx Refill Note  Requested Prescriptions     Pending Prescriptions Disp Refills   • Ozempic, 0.25 or 0.5 MG/DOSE, 2 MG/1.5ML solution pen-injector [Pharmacy Med Name: OZEMPIC 0.25-0.5 MG/DOSE PEN] 1.5 mL      Sig: DIAL AND INJECT UNDER THE SKIN 0.5 MG WEEKLY      Last office visit with prescribing clinician: 12/9/2021      Next office visit with prescribing clinician: Visit date not found       {TIP  Please add Last Relevant Lab Date if appropriate:   Lab Results   Component Value Date    HGBA1C 7.59 (H) 01/03/2022     {TIP  Is Refill Pharmacy correct?: yes   Savi Castle  02/07/22, 14:50 EST

## 2022-02-08 RX ORDER — SEMAGLUTIDE 1.34 MG/ML
INJECTION, SOLUTION SUBCUTANEOUS
Qty: 1.5 ML | Refills: 4 | Status: SHIPPED | OUTPATIENT
Start: 2022-02-08 | End: 2022-06-24

## 2022-03-03 RX ORDER — FLUCONAZOLE 150 MG/1
150 TABLET ORAL ONCE
Qty: 1 TABLET | Refills: 0 | Status: SHIPPED | OUTPATIENT
Start: 2022-03-03 | End: 2022-03-03

## 2022-03-07 RX ORDER — LEVOTHYROXINE SODIUM 112 MCG
112 TABLET ORAL DAILY
Qty: 30 TABLET | Refills: 0 | Status: SHIPPED | OUTPATIENT
Start: 2022-03-07 | End: 2022-04-11

## 2022-03-29 DIAGNOSIS — E78.2 MIXED HYPERLIPIDEMIA: ICD-10-CM

## 2022-03-29 RX ORDER — ROSUVASTATIN CALCIUM 5 MG/1
TABLET, COATED ORAL
Qty: 30 TABLET | Refills: 1 | Status: SHIPPED | OUTPATIENT
Start: 2022-03-29 | End: 2022-06-01

## 2022-04-04 ENCOUNTER — LAB (OUTPATIENT)
Dept: LAB | Facility: HOSPITAL | Age: 67
End: 2022-04-04

## 2022-04-04 ENCOUNTER — OFFICE VISIT (OUTPATIENT)
Dept: FAMILY MEDICINE CLINIC | Age: 67
End: 2022-04-04

## 2022-04-04 VITALS
BODY MASS INDEX: 31.92 KG/M2 | WEIGHT: 187.4 LBS | DIASTOLIC BLOOD PRESSURE: 75 MMHG | SYSTOLIC BLOOD PRESSURE: 112 MMHG | HEART RATE: 80 BPM | OXYGEN SATURATION: 98 %

## 2022-04-04 DIAGNOSIS — E11.9 TYPE 2 DIABETES MELLITUS WITHOUT COMPLICATION, WITHOUT LONG-TERM CURRENT USE OF INSULIN: ICD-10-CM

## 2022-04-04 DIAGNOSIS — I10 ESSENTIAL HYPERTENSION: ICD-10-CM

## 2022-04-04 DIAGNOSIS — E03.9 HYPOTHYROIDISM (ACQUIRED): ICD-10-CM

## 2022-04-04 DIAGNOSIS — R10.11 RUQ ABDOMINAL PAIN: ICD-10-CM

## 2022-04-04 DIAGNOSIS — R10.9 FLANK PAIN: Primary | ICD-10-CM

## 2022-04-04 LAB
ALBUMIN SERPL-MCNC: 4.2 G/DL (ref 3.5–5.2)
ALBUMIN/GLOB SERPL: 1.6 G/DL
ALP SERPL-CCNC: 109 U/L (ref 39–117)
ALT SERPL W P-5'-P-CCNC: 13 U/L (ref 1–33)
ANION GAP SERPL CALCULATED.3IONS-SCNC: 14.8 MMOL/L (ref 5–15)
AST SERPL-CCNC: 13 U/L (ref 1–32)
BASOPHILS # BLD AUTO: 0.04 10*3/MM3 (ref 0–0.2)
BASOPHILS NFR BLD AUTO: 0.4 % (ref 0–1.5)
BILIRUB SERPL-MCNC: 0.2 MG/DL (ref 0–1.2)
BILIRUB UR QL STRIP: NEGATIVE
BUN SERPL-MCNC: 17 MG/DL (ref 8–23)
BUN/CREAT SERPL: 16.2 (ref 7–25)
CALCIUM SPEC-SCNC: 9.9 MG/DL (ref 8.6–10.5)
CHLORIDE SERPL-SCNC: 101 MMOL/L (ref 98–107)
CLARITY UR: ABNORMAL
CO2 SERPL-SCNC: 24.2 MMOL/L (ref 22–29)
COLOR UR: YELLOW
CREAT SERPL-MCNC: 1.05 MG/DL (ref 0.57–1)
DEPRECATED RDW RBC AUTO: 52 FL (ref 37–54)
EGFRCR SERPLBLD CKD-EPI 2021: 58.7 ML/MIN/1.73
EOSINOPHIL # BLD AUTO: 0.29 10*3/MM3 (ref 0–0.4)
EOSINOPHIL NFR BLD AUTO: 3.1 % (ref 0.3–6.2)
ERYTHROCYTE [DISTWIDTH] IN BLOOD BY AUTOMATED COUNT: 15.3 % (ref 12.3–15.4)
GLOBULIN UR ELPH-MCNC: 2.7 GM/DL
GLUCOSE SERPL-MCNC: 181 MG/DL (ref 65–99)
GLUCOSE UR STRIP-MCNC: NEGATIVE MG/DL
HBA1C MFR BLD: 6.8 % (ref 4.8–5.6)
HCT VFR BLD AUTO: 39.5 % (ref 34–46.6)
HGB BLD-MCNC: 12.5 G/DL (ref 12–15.9)
HGB UR QL STRIP.AUTO: NEGATIVE
IMM GRANULOCYTES # BLD AUTO: 0.01 10*3/MM3 (ref 0–0.05)
IMM GRANULOCYTES NFR BLD AUTO: 0.1 % (ref 0–0.5)
KETONES UR QL STRIP: NEGATIVE
LEUKOCYTE ESTERASE UR QL STRIP.AUTO: NEGATIVE
LYMPHOCYTES # BLD AUTO: 3.49 10*3/MM3 (ref 0.7–3.1)
LYMPHOCYTES NFR BLD AUTO: 37.5 % (ref 19.6–45.3)
MCH RBC QN AUTO: 29 PG (ref 26.6–33)
MCHC RBC AUTO-ENTMCNC: 31.6 G/DL (ref 31.5–35.7)
MCV RBC AUTO: 91.6 FL (ref 79–97)
MONOCYTES # BLD AUTO: 0.69 10*3/MM3 (ref 0.1–0.9)
MONOCYTES NFR BLD AUTO: 7.4 % (ref 5–12)
NEUTROPHILS NFR BLD AUTO: 4.79 10*3/MM3 (ref 1.7–7)
NEUTROPHILS NFR BLD AUTO: 51.5 % (ref 42.7–76)
NITRITE UR QL STRIP: NEGATIVE
PH UR STRIP.AUTO: <=5 [PH] (ref 5–8)
PLATELET # BLD AUTO: 355 10*3/MM3 (ref 140–450)
PMV BLD AUTO: 10.4 FL (ref 6–12)
POTASSIUM SERPL-SCNC: 4.3 MMOL/L (ref 3.5–5.2)
PROT SERPL-MCNC: 6.9 G/DL (ref 6–8.5)
PROT UR QL STRIP: NEGATIVE
RBC # BLD AUTO: 4.31 10*6/MM3 (ref 3.77–5.28)
SODIUM SERPL-SCNC: 140 MMOL/L (ref 136–145)
SP GR UR STRIP: >=1.03 (ref 1–1.03)
TSH SERPL DL<=0.05 MIU/L-ACNC: 3.71 UIU/ML (ref 0.27–4.2)
UROBILINOGEN UR QL STRIP: ABNORMAL
WBC NRBC COR # BLD: 9.31 10*3/MM3 (ref 3.4–10.8)

## 2022-04-04 PROCEDURE — 81003 URINALYSIS AUTO W/O SCOPE: CPT | Performed by: NURSE PRACTITIONER

## 2022-04-04 PROCEDURE — 80050 GENERAL HEALTH PANEL: CPT

## 2022-04-04 PROCEDURE — 36415 COLL VENOUS BLD VENIPUNCTURE: CPT

## 2022-04-04 PROCEDURE — 83036 HEMOGLOBIN GLYCOSYLATED A1C: CPT

## 2022-04-04 PROCEDURE — 99214 OFFICE O/P EST MOD 30 MIN: CPT | Performed by: NURSE PRACTITIONER

## 2022-04-04 RX ORDER — LIFITEGRAST 50 MG/ML
1 SOLUTION/ DROPS OPHTHALMIC 2 TIMES DAILY
COMMUNITY
Start: 2022-01-13

## 2022-04-04 NOTE — PROGRESS NOTES
Guillermina Gardner presents to [facility].    Chief Complaint:  Back Pain (Middle back and upper right stomach pain, pt states it is a stabbing pain. Pt states she was covid + on 22 but still feels lethargic. )         History of Present Illness:  Joint pain:   Right Mid back pain  Symptoms started: over a month ago   associated symptoms: pain in upper right abd, stabbing pain (7 out of 10 at times)   Treatment tried: some topical heat   Dg testing : renal ultrasound, bilateral renal cysts in     Diabetes:  Current medication metformin/ ozempic   Tolerating medication: Yes but has some nausea with ozempic   At home BS ranges: 120's   Lab Results       Component                Value               Date                       HGBA1C                   7.59 (H)            2022             Hypothyroidism  Current rx: synthroid 112   Tolerating rx: yes     Lab Results       Component                Value               Date                       TSH                      2.600               2021                   PAST MEDICAL HISTORY changes since her last visit here:       covid + 2-    Dizziness - Seen Dr. Almaguer         GYNECOLOGICAL HISTORY:             PREVENTIVE HEALTH MAINTENANCE             Hepatitis C Medicare Screening: was last done 3-17-18; negative     MAMMOGRAM: Done within last 2 years and results in are chart was last done 01/15/21 stable         Surgical History:         : X 2;     Lap Band ; Procedures: Ablation     Positive for    thumb joint left replaced/repaired 8-6-15; and    right thumb replaced 18;;     COLONOSCOPY: was last done 2016 with normal results Dr Rosa Forte         Family History:     Father:  at age 89;  Type 2 Diabetes; Hypothyroidism;  Macular degeneration     Mother:  at age 90;  Hypertension;  Dementia     Sister(s): Hypertension; Endocrine Hypothyroidism         Social History:      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       Review of Systems:  Review of Systems   Constitutional: Positive for fatigue. Negative for fever.   Respiratory: Positive for shortness of breath (mild, with more strenuous exertion since covid ). Negative for cough.    Cardiovascular: Negative for chest pain, palpitations and leg swelling.   Gastrointestinal: Positive for nausea.   Genitourinary: Negative for dysuria.   Musculoskeletal: Positive for back pain.   Neurological: Negative for numbness.        Abnormal taste and smell           Current Outpatient Medications:   •  atenolol (TENORMIN) 25 MG tablet, Take 1 tablet by mouth Daily., Disp: 90 tablet, Rfl: 1  •  buPROPion XL (WELLBUTRIN XL) 300 MG 24 hr tablet, Take 1 tablet by mouth Daily., Disp: 90 tablet, Rfl: 1  •  cholecalciferol (VITAMIN D3) 25 MCG (1000 UT) tablet, Take 1,000 Units by mouth Daily., Disp: , Rfl:   •  coenzyme Q10 100 MG capsule, Take 1 tablet by mouth Daily., Disp: , Rfl:   •  DULoxetine (CYMBALTA) 60 MG capsule, Take 1 capsule by mouth 2 (Two) Times a Day., Disp: 180 capsule, Rfl: 1  •  guaiFENesin-codeine (GUAIFENESIN AC) 100-10 MG/5ML liquid, 5 mL at HS prn, Disp: 118 mL, Rfl: 0  •  hydroCHLOROthiazide (HYDRODIURIL) 25 MG tablet, Take 1 tablet by mouth Daily., Disp: 90 tablet, Rfl: 1  •  lisinopril (PRINIVIL,ZESTRIL) 10 MG tablet, Take 1 tablet by mouth Daily., Disp: 90 tablet, Rfl: 1  •  metFORMIN (GLUCOPHAGE) 500 MG tablet, Take 2 tablets by mouth 2 (Two) Times a Day., Disp: 360 tablet, Rfl: 1  •  Ozempic, 0.25 or 0.5 MG/DOSE, 2 MG/1.5ML solution pen-injector, DIAL AND INJECT UNDER THE SKIN 0.5 MG WEEKLY, Disp: 1.5 mL, Rfl: 4  •  Premarin 0.625 MG/GM vaginal cream, Insert 0.5 g into the vagina Take As Directed. 3 weeks on and 1 week off, Disp: 3 each, Rfl: 0  •   rosuvastatin (CRESTOR) 5 MG tablet, TAKE ONE TABLET BY MOUTH DAILY, Disp: 30 tablet, Rfl: 1  •  SITagliptin (Januvia) 100 MG tablet, Take 1 tablet by mouth Daily., Disp: 90 tablet, Rfl: 1  •  SUMAtriptan (IMITREX) 50 MG tablet, TAKE ONE TABLET BY MOUTH AT ONSET OF HEADACHE; MAY REPEAT ONE TABLET IN 2 HOURS IF NEEDED. MAX DOSE 200 MG IN 24 HOURS, Disp: 9 tablet, Rfl: 2  •  Synthroid 112 MCG tablet, Take 1 tablet by mouth Daily. DUE FOR OFFICE VISIT FOR FURTHER REFILLS, Disp: 30 tablet, Rfl: 0  •  TiZANidine (ZANAFLEX) 4 MG capsule, Take 1 capsule by mouth 3 (Three) Times a Day., Disp: 60 capsule, Rfl: 1  •  traZODone (DESYREL) 50 MG tablet, TAKE ONE TABLET BY MOUTH ONCE NIGHTLY AS NEEDED FOR SLEEP, Disp: 90 tablet, Rfl: 0  •  triamcinolone (KENALOG) 0.1 % cream, Apply 1 application topically to the appropriate area as directed Take As Directed., Disp: , Rfl:   •  Xiidra 5 % ophthalmic solution, Apply 1 drop to eye(s) as directed by provider 2 (Two) Times a Day., Disp: , Rfl:     Vital Signs:   Vitals:    04/04/22 1414   BP: 112/75   BP Location: Right arm   Patient Position: Sitting   Pulse: 80   SpO2: 98%   Weight: 85 kg (187 lb 6.4 oz)         Physical Exam:  Physical Exam  Vitals reviewed.   Constitutional:       General: She is not in acute distress.     Appearance: Normal appearance.   Neck:      Vascular: No carotid bruit.   Cardiovascular:      Rate and Rhythm: Normal rate and regular rhythm.      Heart sounds: Normal heart sounds. No murmur heard.  Pulmonary:      Effort: Pulmonary effort is normal. No respiratory distress.      Breath sounds: Normal breath sounds.   Abdominal:      Palpations: Abdomen is soft.      Tenderness: There is abdominal tenderness (RUQ). There is right CVA tenderness. There is no guarding or rebound.   Musculoskeletal:      Right lower leg: No edema.      Left lower leg: No edema.   Neurological:      Mental Status: She is alert.   Psychiatric:         Mood and Affect: Mood normal.          Behavior: Behavior normal.         Result Review      The following data was reviewed by: SHANNA Felix on 04/04/2022:    Results for orders placed or performed in visit on 01/03/22   Protein / Creatinine Ratio, Urine - Urine, Clean Catch    Specimen: Urine, Clean Catch   Result Value Ref Range    Creatinine, Urine 178.4 mg/dL    Total Protein, Urine 14.8 mg/dL    Protein/Creatinine Ratio, Urine 0.1    Comprehensive Metabolic Panel    Specimen: Blood   Result Value Ref Range    Glucose 170 (H) 65 - 99 mg/dL    BUN 14 8 - 23 mg/dL    Creatinine 0.91 0.57 - 1.00 mg/dL    Sodium 137 136 - 145 mmol/L    Potassium 3.8 3.5 - 5.2 mmol/L    Chloride 98 98 - 107 mmol/L    CO2 29.2 (H) 22.0 - 29.0 mmol/L    Calcium 9.7 8.6 - 10.5 mg/dL    Total Protein 6.5 6.0 - 8.5 g/dL    Albumin 4.10 3.50 - 5.20 g/dL    ALT (SGPT) 12 1 - 33 U/L    AST (SGOT) 16 1 - 32 U/L    Alkaline Phosphatase 98 39 - 117 U/L    Total Bilirubin 0.4 0.0 - 1.2 mg/dL    eGFR Non African Amer 62 >60 mL/min/1.73    Globulin 2.4 gm/dL    A/G Ratio 1.7 g/dL    BUN/Creatinine Ratio 15.4 7.0 - 25.0    Anion Gap 9.8 5.0 - 15.0 mmol/L   Hemoglobin A1c    Specimen: Blood   Result Value Ref Range    Hemoglobin A1C 7.59 (H) 4.80 - 5.60 %   Urinalysis With Microscopic If Indicated (No Culture) - Urine, Clean Catch    Specimen: Urine, Clean Catch   Result Value Ref Range    Color, UA Yellow Yellow, Straw    Appearance, UA Slightly Cloudy (A) Clear    pH, UA 5.5 5.0 - 8.0    Specific Gravity, UA 1.025 1.005 - 1.030    Glucose, UA Negative Negative    Ketones, UA Negative Negative    Bilirubin, UA Negative Negative    Blood, UA Negative Negative    Protein, UA Negative Negative    Leuk Esterase, UA Small (1+) (A) Negative    Nitrite, UA Negative Negative    Urobilinogen, UA 0.2 E.U./dL 0.2 - 1.0 E.U./dL   Microalbumin / Creatinine Urine Ratio - Urine, Clean Catch    Specimen: Urine, Clean Catch   Result Value Ref Range    Microalbumin/Creatinine Ratio  13.2 mg/g    Creatinine, Urine 151.1 mg/dL    Microalbumin, Urine 2.0 mg/dL   CBC Auto Differential    Specimen: Blood   Result Value Ref Range    WBC 6.12 3.40 - 10.80 10*3/mm3    RBC 4.47 3.77 - 5.28 10*6/mm3    Hemoglobin 12.6 12.0 - 15.9 g/dL    Hematocrit 39.1 34.0 - 46.6 %    MCV 87.5 79.0 - 97.0 fL    MCH 28.2 26.6 - 33.0 pg    MCHC 32.2 31.5 - 35.7 g/dL    RDW 14.4 12.3 - 15.4 %    RDW-SD 46.8 37.0 - 54.0 fl    MPV 10.2 6.0 - 12.0 fL    Platelets 326 140 - 450 10*3/mm3    Neutrophil % 54.9 42.7 - 76.0 %    Lymphocyte % 35.0 19.6 - 45.3 %    Monocyte % 7.7 5.0 - 12.0 %    Eosinophil % 2.1 0.3 - 6.2 %    Basophil % 0.3 0.0 - 1.5 %    Immature Grans % 0.0 0.0 - 0.5 %    Neutrophils, Absolute 3.36 1.70 - 7.00 10*3/mm3    Lymphocytes, Absolute 2.14 0.70 - 3.10 10*3/mm3    Monocytes, Absolute 0.47 0.10 - 0.90 10*3/mm3    Eosinophils, Absolute 0.13 0.00 - 0.40 10*3/mm3    Basophils, Absolute 0.02 0.00 - 0.20 10*3/mm3    Immature Grans, Absolute 0.00 0.00 - 0.05 10*3/mm3   Urinalysis, Microscopic Only - Urine, Clean Catch    Specimen: Urine, Clean Catch   Result Value Ref Range    RBC, UA None Seen None Seen /HPF    WBC, UA 0-2 (A) None Seen /HPF    Bacteria, UA None Seen None Seen /HPF    Squamous Epithelial Cells, UA 3-6 (A) None Seen, 0-2 /HPF    Methodology Manual Light Microscopy                Assessment and Plan:          Diagnoses and all orders for this visit:    1. Flank pain (Primary)  -     Urinalysis With Culture If Indicated - Urine, Clean Catch    2. Type 2 diabetes mellitus without complication, without long-term current use of insulin (HCC)  -     Hemoglobin A1c; Future  -     Comprehensive Metabolic Panel; Future  -     CBC & Differential; Future    3. Hypothyroidism (acquired)  -     TSH; Future    4. Essential hypertension  -     Comprehensive Metabolic Panel; Future    5. RUQ abdominal pain  Assessment & Plan:  Checking labs, consider RUQ ultrasound to evaluate her gall bladder           Follow  Up   Return for followup pending lab results.  Patient was given instructions and counseling regarding her condition or for health maintenance advice. Please see specific information pulled into the AVS if appropriate.

## 2022-04-07 DIAGNOSIS — R10.11 RUQ ABDOMINAL PAIN: Primary | ICD-10-CM

## 2022-04-07 DIAGNOSIS — R10.9 FLANK PAIN: ICD-10-CM

## 2022-04-09 DIAGNOSIS — G47.09 OTHER INSOMNIA: ICD-10-CM

## 2022-04-11 RX ORDER — TRAZODONE HYDROCHLORIDE 50 MG/1
TABLET ORAL
Qty: 30 TABLET | Refills: 0 | Status: SHIPPED | OUTPATIENT
Start: 2022-04-11 | End: 2022-05-09

## 2022-04-11 RX ORDER — LEVOTHYROXINE SODIUM 112 MCG
TABLET ORAL
Qty: 30 TABLET | Refills: 0 | Status: SHIPPED | OUTPATIENT
Start: 2022-04-11 | End: 2022-05-09

## 2022-05-02 ENCOUNTER — HOSPITAL ENCOUNTER (OUTPATIENT)
Dept: ULTRASOUND IMAGING | Facility: HOSPITAL | Age: 67
Discharge: HOME | End: 2022-05-02
Admitting: NURSE PRACTITIONER

## 2022-05-02 DIAGNOSIS — R10.9 FLANK PAIN: ICD-10-CM

## 2022-05-02 DIAGNOSIS — R10.11 RUQ ABDOMINAL PAIN: ICD-10-CM

## 2022-05-02 PROCEDURE — 76705 ECHO EXAM OF ABDOMEN: CPT

## 2022-05-08 DIAGNOSIS — N95.9 MENOPAUSAL AND PERIMENOPAUSAL DISORDER: ICD-10-CM

## 2022-05-08 DIAGNOSIS — Z12.31 SCREENING MAMMOGRAM FOR BREAST CANCER: ICD-10-CM

## 2022-05-08 DIAGNOSIS — E03.9 HYPOTHYROIDISM (ACQUIRED): Primary | ICD-10-CM

## 2022-05-09 DIAGNOSIS — G47.09 OTHER INSOMNIA: ICD-10-CM

## 2022-05-09 RX ORDER — LEVOTHYROXINE SODIUM 112 MCG
112 TABLET ORAL DAILY
Qty: 90 TABLET | Refills: 1 | Status: SHIPPED | OUTPATIENT
Start: 2022-05-09 | End: 2022-11-14

## 2022-05-09 RX ORDER — CONJUGATED ESTROGENS 0.62 MG/G
CREAM VAGINAL
Qty: 60 G | Refills: 0 | Status: SHIPPED | OUTPATIENT
Start: 2022-05-09 | End: 2022-09-07

## 2022-05-09 RX ORDER — TRAZODONE HYDROCHLORIDE 50 MG/1
TABLET ORAL
Qty: 30 TABLET | Refills: 0 | Status: SHIPPED | OUTPATIENT
Start: 2022-05-09 | End: 2022-06-08

## 2022-05-09 NOTE — TELEPHONE ENCOUNTER
Send 90 days supply for both, see about her mammogram, last one 1-2021, would need mammogram done

## 2022-05-10 RX ORDER — SUMATRIPTAN 50 MG/1
TABLET, FILM COATED ORAL
Qty: 9 TABLET | Refills: 2 | Status: SHIPPED | OUTPATIENT
Start: 2022-05-10

## 2022-05-10 NOTE — TELEPHONE ENCOUNTER
Rx Refill Note  Requested Prescriptions     Pending Prescriptions Disp Refills   • SUMAtriptan (IMITREX) 50 MG tablet 9 tablet 2     Sig: TAKE ONE TABLET BY MOUTH AT ONSET OF HEADACHE; MAY REPEAT ONE TABLET IN 2 HOURS IF NEEDED. MAX DOSE 200 MG IN 24 HOURS      Last office visit with prescribing clinician: 4/4/2022      Next office visit with prescribing clinician: Visit date not found  Last fill: 02/01/22, #9, 2 refills    Adrienne Dela Cruz LPN  05/10/22, 10:39 EDT

## 2022-05-25 DIAGNOSIS — I10 ESSENTIAL HYPERTENSION: ICD-10-CM

## 2022-05-25 RX ORDER — HYDROCHLOROTHIAZIDE 25 MG/1
TABLET ORAL
Qty: 90 TABLET | Refills: 1 | Status: SHIPPED | OUTPATIENT
Start: 2022-05-25 | End: 2022-11-21

## 2022-05-30 DIAGNOSIS — E11.9 TYPE 2 DIABETES MELLITUS WITHOUT COMPLICATION, WITHOUT LONG-TERM CURRENT USE OF INSULIN: ICD-10-CM

## 2022-05-30 DIAGNOSIS — F41.3 OTHER MIXED ANXIETY DISORDERS: ICD-10-CM

## 2022-05-31 DIAGNOSIS — E11.9 TYPE 2 DIABETES MELLITUS WITHOUT COMPLICATION, WITHOUT LONG-TERM CURRENT USE OF INSULIN: ICD-10-CM

## 2022-05-31 DIAGNOSIS — F41.3 OTHER MIXED ANXIETY DISORDERS: ICD-10-CM

## 2022-05-31 RX ORDER — SITAGLIPTIN 100 MG/1
TABLET, FILM COATED ORAL
Qty: 90 TABLET | Refills: 1 | Status: SHIPPED | OUTPATIENT
Start: 2022-05-31 | End: 2022-12-06

## 2022-05-31 RX ORDER — DULOXETIN HYDROCHLORIDE 60 MG/1
CAPSULE, DELAYED RELEASE ORAL
Qty: 180 CAPSULE | Refills: 1 | OUTPATIENT
Start: 2022-05-31

## 2022-05-31 RX ORDER — DULOXETIN HYDROCHLORIDE 60 MG/1
CAPSULE, DELAYED RELEASE ORAL
Qty: 180 CAPSULE | Refills: 1 | Status: SHIPPED | OUTPATIENT
Start: 2022-05-31 | End: 2022-12-06

## 2022-05-31 RX ORDER — SITAGLIPTIN 100 MG/1
TABLET, FILM COATED ORAL
Qty: 90 TABLET | Refills: 1 | OUTPATIENT
Start: 2022-05-31

## 2022-06-01 DIAGNOSIS — E78.2 MIXED HYPERLIPIDEMIA: ICD-10-CM

## 2022-06-01 RX ORDER — ROSUVASTATIN CALCIUM 5 MG/1
TABLET, COATED ORAL
Qty: 30 TABLET | Refills: 1 | Status: SHIPPED | OUTPATIENT
Start: 2022-06-01 | End: 2022-07-28

## 2022-06-08 DIAGNOSIS — G47.09 OTHER INSOMNIA: ICD-10-CM

## 2022-06-08 RX ORDER — TRAZODONE HYDROCHLORIDE 50 MG/1
TABLET ORAL
Qty: 90 TABLET | Refills: 0 | Status: SHIPPED | OUTPATIENT
Start: 2022-06-08 | End: 2022-09-12

## 2022-06-08 NOTE — TELEPHONE ENCOUNTER
Rx Refill Note  Requested Prescriptions     Pending Prescriptions Disp Refills   • traZODone (DESYREL) 50 MG tablet [Pharmacy Med Name: traZODone 50 MG TABLET] 30 tablet 0     Sig: TAKE ONE TABLET BY MOUTH ONCE NIGHTLY AS NEEDED FOR SLEEP      Last office visit with prescribing clinician: 4/4/2022      Next office visit with prescribing clinician: Visit date not found    Adrienne Dela Cruz LPN  06/08/22, 09:57 EDT

## 2022-06-09 DIAGNOSIS — E11.9 TYPE 2 DIABETES MELLITUS WITHOUT COMPLICATION, WITHOUT LONG-TERM CURRENT USE OF INSULIN: ICD-10-CM

## 2022-06-09 NOTE — TELEPHONE ENCOUNTER
Rx Refill Note  Requested Prescriptions     Pending Prescriptions Disp Refills   • metFORMIN (GLUCOPHAGE) 500 MG tablet [Pharmacy Med Name: metFORMIN  MG TABLET] 360 tablet 1     Sig: TAKE TWO TABLETS BY MOUTH TWICE A DAY      Last office visit with prescribing clinician: 4/4/2022      Next office visit with prescribing clinician: none        3}Adrienne Dela Cruz LPN  06/09/22, 08:07 EDT

## 2022-06-16 ENCOUNTER — APPOINTMENT (OUTPATIENT)
Dept: MAMMOGRAPHY | Facility: HOSPITAL | Age: 67
End: 2022-06-16

## 2022-06-23 DIAGNOSIS — E11.9 TYPE 2 DIABETES MELLITUS WITHOUT COMPLICATION, WITHOUT LONG-TERM CURRENT USE OF INSULIN: ICD-10-CM

## 2022-06-24 RX ORDER — SEMAGLUTIDE 1.34 MG/ML
INJECTION, SOLUTION SUBCUTANEOUS
Qty: 1.5 ML | Refills: 4 | Status: SHIPPED | OUTPATIENT
Start: 2022-06-24 | End: 2022-11-11

## 2022-06-24 NOTE — TELEPHONE ENCOUNTER
Rx Refill Note  Requested Prescriptions     Pending Prescriptions Disp Refills   • Ozempic, 0.25 or 0.5 MG/DOSE, 2 MG/1.5ML solution pen-injector [Pharmacy Med Name: OZEMPIC 0.25-0.5 MG/DOSE PEN] 1.5 mL 4     Sig: DIAL AND INJECT UNDER THE SKIN 0.5 MG WEEKLY      Last office visit with prescribing clinician: 4/4/2022      Next office visit with prescribing clinician: 7/6/2022  Last fill: 02/08/22, 4 refills  Lab: Hemoglobin A1c (04/04/2022 15:19)      Adrienne Dela Cruz LPN  06/24/22, 09:25 EDT

## 2022-06-28 ENCOUNTER — PRIOR AUTHORIZATION (OUTPATIENT)
Dept: FAMILY MEDICINE CLINIC | Age: 67
End: 2022-06-28

## 2022-06-28 NOTE — TELEPHONE ENCOUNTER
Received PA request for Ozempic, Key: [id number].     Available without authorization - spoke to Melia at [facility], she said that it is going through for no charge and will be ready for  tomorrow 6/29.

## 2022-06-29 ENCOUNTER — APPOINTMENT (OUTPATIENT)
Dept: MAMMOGRAPHY | Facility: HOSPITAL | Age: 67
End: 2022-06-29

## 2022-06-30 ENCOUNTER — TRANSCRIBE ORDERS (OUTPATIENT)
Dept: ADMINISTRATIVE | Facility: HOSPITAL | Age: 67
End: 2022-06-30

## 2022-06-30 DIAGNOSIS — N18.31 STAGE 3A CHRONIC KIDNEY DISEASE: Primary | ICD-10-CM

## 2022-06-30 DIAGNOSIS — I10 ESSENTIAL HYPERTENSION: ICD-10-CM

## 2022-07-06 ENCOUNTER — OFFICE VISIT (OUTPATIENT)
Dept: FAMILY MEDICINE CLINIC | Age: 67
End: 2022-07-06

## 2022-07-06 ENCOUNTER — LAB (OUTPATIENT)
Dept: LAB | Facility: HOSPITAL | Age: 67
End: 2022-07-06

## 2022-07-06 ENCOUNTER — TRANSCRIBE ORDERS (OUTPATIENT)
Dept: ADMINISTRATIVE | Facility: HOSPITAL | Age: 67
End: 2022-07-06

## 2022-07-06 VITALS
SYSTOLIC BLOOD PRESSURE: 94 MMHG | BODY MASS INDEX: 31 KG/M2 | WEIGHT: 182 LBS | DIASTOLIC BLOOD PRESSURE: 63 MMHG | HEART RATE: 71 BPM

## 2022-07-06 DIAGNOSIS — E11.9 DIABETES MELLITUS WITHOUT COMPLICATION: ICD-10-CM

## 2022-07-06 DIAGNOSIS — E78.2 MIXED HYPERLIPIDEMIA: ICD-10-CM

## 2022-07-06 DIAGNOSIS — E03.9 HYPOTHYROIDISM (ACQUIRED): ICD-10-CM

## 2022-07-06 DIAGNOSIS — L70.0 CLOSED COMEDONE: ICD-10-CM

## 2022-07-06 DIAGNOSIS — N18.30 STAGE 3 CHRONIC KIDNEY DISEASE, UNSPECIFIED WHETHER STAGE 3A OR 3B CKD: Primary | ICD-10-CM

## 2022-07-06 DIAGNOSIS — I10 HYPERTENSION, ESSENTIAL: ICD-10-CM

## 2022-07-06 DIAGNOSIS — R11.0 NAUSEA: ICD-10-CM

## 2022-07-06 DIAGNOSIS — Z23 IMMUNIZATION DUE: ICD-10-CM

## 2022-07-06 DIAGNOSIS — Z87.2 HISTORY OF SEBACEOUS CYST: ICD-10-CM

## 2022-07-06 DIAGNOSIS — N18.30 STAGE 3 CHRONIC KIDNEY DISEASE, UNSPECIFIED WHETHER STAGE 3A OR 3B CKD: ICD-10-CM

## 2022-07-06 DIAGNOSIS — Z78.0 POSTMENOPAUSAL: ICD-10-CM

## 2022-07-06 DIAGNOSIS — E11.9 TYPE 2 DIABETES MELLITUS WITHOUT COMPLICATION, WITHOUT LONG-TERM CURRENT USE OF INSULIN: ICD-10-CM

## 2022-07-06 DIAGNOSIS — Z00.00 ROUTINE GENERAL MEDICAL EXAMINATION AT A HEALTH CARE FACILITY: Primary | ICD-10-CM

## 2022-07-06 PROBLEM — R05.9 COUGH: Status: RESOLVED | Noted: 2021-12-09 | Resolved: 2022-07-06

## 2022-07-06 LAB
ALBUMIN SERPL-MCNC: 4.3 G/DL (ref 3.5–5.2)
ALBUMIN UR-MCNC: <1.2 MG/DL
ALBUMIN/GLOB SERPL: 1.5 G/DL
ALP SERPL-CCNC: 103 U/L (ref 39–117)
ALT SERPL W P-5'-P-CCNC: 13 U/L (ref 1–33)
AMORPH URATE CRY URNS QL MICRO: ABNORMAL /HPF
ANION GAP SERPL CALCULATED.3IONS-SCNC: 11.9 MMOL/L (ref 5–15)
AST SERPL-CCNC: 15 U/L (ref 1–32)
BACTERIA UR QL AUTO: ABNORMAL /HPF
BASOPHILS # BLD AUTO: 0.03 10*3/MM3 (ref 0–0.2)
BASOPHILS NFR BLD AUTO: 0.3 % (ref 0–1.5)
BILIRUB SERPL-MCNC: 0.3 MG/DL (ref 0–1.2)
BILIRUB UR QL STRIP: NEGATIVE
BUN SERPL-MCNC: 20 MG/DL (ref 8–23)
BUN/CREAT SERPL: 18.5 (ref 7–25)
CALCIUM SPEC-SCNC: 9.8 MG/DL (ref 8.6–10.5)
CHLORIDE SERPL-SCNC: 99 MMOL/L (ref 98–107)
CHOLEST SERPL-MCNC: 159 MG/DL (ref 0–200)
CLARITY UR: ABNORMAL
CO2 SERPL-SCNC: 28.1 MMOL/L (ref 22–29)
COLOR UR: YELLOW
CREAT SERPL-MCNC: 1.08 MG/DL (ref 0.57–1)
CREAT UR-MCNC: 155.3 MG/DL
CREAT UR-MCNC: 184.5 MG/DL
DEPRECATED RDW RBC AUTO: 47.5 FL (ref 37–54)
EGFRCR SERPLBLD CKD-EPI 2021: 56.8 ML/MIN/1.73
EOSINOPHIL # BLD AUTO: 0.41 10*3/MM3 (ref 0–0.4)
EOSINOPHIL NFR BLD AUTO: 4.5 % (ref 0.3–6.2)
ERYTHROCYTE [DISTWIDTH] IN BLOOD BY AUTOMATED COUNT: 14.1 % (ref 12.3–15.4)
GLOBULIN UR ELPH-MCNC: 2.8 GM/DL
GLUCOSE SERPL-MCNC: 133 MG/DL (ref 65–99)
GLUCOSE UR STRIP-MCNC: NEGATIVE MG/DL
HBA1C MFR BLD: 6.9 % (ref 4.8–5.6)
HCT VFR BLD AUTO: 39.1 % (ref 34–46.6)
HDLC SERPL-MCNC: 61 MG/DL (ref 40–60)
HGB BLD-MCNC: 12.3 G/DL (ref 12–15.9)
HGB UR QL STRIP.AUTO: NEGATIVE
IMM GRANULOCYTES # BLD AUTO: 0.02 10*3/MM3 (ref 0–0.05)
IMM GRANULOCYTES NFR BLD AUTO: 0.2 % (ref 0–0.5)
KETONES UR QL STRIP: NEGATIVE
LDLC SERPL CALC-MCNC: 81 MG/DL (ref 0–100)
LDLC/HDLC SERPL: 1.31 {RATIO}
LEUKOCYTE ESTERASE UR QL STRIP.AUTO: NEGATIVE
LYMPHOCYTES # BLD AUTO: 3.54 10*3/MM3 (ref 0.7–3.1)
LYMPHOCYTES NFR BLD AUTO: 39.2 % (ref 19.6–45.3)
MCH RBC QN AUTO: 28.3 PG (ref 26.6–33)
MCHC RBC AUTO-ENTMCNC: 31.5 G/DL (ref 31.5–35.7)
MCV RBC AUTO: 90.1 FL (ref 79–97)
MICROALBUMIN/CREAT UR: NORMAL MG/G{CREAT}
MONOCYTES # BLD AUTO: 0.69 10*3/MM3 (ref 0.1–0.9)
MONOCYTES NFR BLD AUTO: 7.6 % (ref 5–12)
NEUTROPHILS NFR BLD AUTO: 4.33 10*3/MM3 (ref 1.7–7)
NEUTROPHILS NFR BLD AUTO: 48.2 % (ref 42.7–76)
NITRITE UR QL STRIP: NEGATIVE
PH UR STRIP.AUTO: <=5 [PH] (ref 5–8)
PLATELET # BLD AUTO: 340 10*3/MM3 (ref 140–450)
PMV BLD AUTO: 9.9 FL (ref 6–12)
POTASSIUM SERPL-SCNC: 4.2 MMOL/L (ref 3.5–5.2)
PROT ?TM UR-MCNC: 12.2 MG/DL
PROT SERPL-MCNC: 7.1 G/DL (ref 6–8.5)
PROT UR QL STRIP: NEGATIVE
PROT/CREAT UR: 0.07 MG/G{CREAT}
RBC # BLD AUTO: 4.34 10*6/MM3 (ref 3.77–5.28)
RBC # UR STRIP: ABNORMAL /HPF
REF LAB TEST METHOD: ABNORMAL
SODIUM SERPL-SCNC: 139 MMOL/L (ref 136–145)
SP GR UR STRIP: >=1.03 (ref 1–1.03)
SQUAMOUS #/AREA URNS HPF: ABNORMAL /HPF
TRIGL SERPL-MCNC: 89 MG/DL (ref 0–150)
UROBILINOGEN UR QL STRIP: ABNORMAL
VLDLC SERPL-MCNC: 17 MG/DL (ref 5–40)
WBC # UR STRIP: ABNORMAL /HPF
WBC NRBC COR # BLD: 9.02 10*3/MM3 (ref 3.4–10.8)

## 2022-07-06 PROCEDURE — 80061 LIPID PANEL: CPT

## 2022-07-06 PROCEDURE — G0009 ADMIN PNEUMOCOCCAL VACCINE: HCPCS | Performed by: NURSE PRACTITIONER

## 2022-07-06 PROCEDURE — 82570 ASSAY OF URINE CREATININE: CPT

## 2022-07-06 PROCEDURE — 82043 UR ALBUMIN QUANTITATIVE: CPT

## 2022-07-06 PROCEDURE — 36415 COLL VENOUS BLD VENIPUNCTURE: CPT

## 2022-07-06 PROCEDURE — G0439 PPPS, SUBSEQ VISIT: HCPCS | Performed by: NURSE PRACTITIONER

## 2022-07-06 PROCEDURE — 84156 ASSAY OF PROTEIN URINE: CPT

## 2022-07-06 PROCEDURE — 1170F FXNL STATUS ASSESSED: CPT | Performed by: NURSE PRACTITIONER

## 2022-07-06 PROCEDURE — 90677 PCV20 VACCINE IM: CPT | Performed by: NURSE PRACTITIONER

## 2022-07-06 PROCEDURE — 1159F MED LIST DOCD IN RCRD: CPT | Performed by: NURSE PRACTITIONER

## 2022-07-06 PROCEDURE — 85025 COMPLETE CBC W/AUTO DIFF WBC: CPT

## 2022-07-06 PROCEDURE — 81001 URINALYSIS AUTO W/SCOPE: CPT

## 2022-07-06 PROCEDURE — 80053 COMPREHEN METABOLIC PANEL: CPT

## 2022-07-06 PROCEDURE — 83036 HEMOGLOBIN GLYCOSYLATED A1C: CPT

## 2022-07-06 RX ORDER — PROMETHAZINE HYDROCHLORIDE 25 MG/1
25 TABLET ORAL EVERY 6 HOURS PRN
Qty: 30 TABLET | Refills: 1 | Status: SHIPPED | OUTPATIENT
Start: 2022-07-06 | End: 2023-01-10 | Stop reason: SDUPTHER

## 2022-07-06 NOTE — PROGRESS NOTES
The ABCs of the Annual Wellness Visit  Subsequent Medicare Wellness Visit    Chief Complaint   Patient presents with   • Medicare Wellness-subsequent      Subjective    History of Present Illness:  Guillermina Gardner is a 67 y.o. female who presents for a Subsequent Medicare Wellness Visit.    Medicare wellness HPI  Exercises regularly: yes/ bikes at home   Eats healthy: yes   Last mammogram:/scheduled for  22  Last DEXA: years ago   Last pap smear: > 2 years   BSE: yes   Wears seatbelts: yes   Living will:no  Optometrist: megan SALDIVAR   Dentist: Rolan SALDIVAR   Tobacco:quit years ago   Alcohol intake: not drinking   Drugs:none   Falls: fell at home going down basement steps, 2 weeks ago, symptoms resolved   Colonoscopy: 2016  Immunizations:tdap ,     PAST MEDICAL HISTORY changes since her last visit here:        covid + 22    Dizziness - Seen Dr. Almaguer         GYNECOLOGICAL HISTORY:             PREVENTIVE HEALTH MAINTENANCE             Hepatitis C Medicare Screening: was last done 3-17-18; negative     MAMMOGRAM: Done within last 2 years and results in are chart was last done 01/15/21 stable         Surgical History:         : X 2;     Lap Band ; Procedures: Ablation     Positive for    thumb joint left replaced/repaired 8-6-15; and    right thumb replaced 18;;     COLONOSCOPY: was last done 2016 with normal results Dr Rosa Forte         Family History:     Father:  at age 89;  Type 2 Diabetes; Hypothyroidism;  Macular degeneration     Mother:  at age 90;  Hypertension;  Dementia     Sister(s): Hypertension; Endocrine Hypothyroidism         Social History:     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    Compared to one year ago, the patient feels her physical   health is better.    Compared to one year ago, the patient feels her mental   health is the same.    Recent Hospitalizations:  She was not admitted to the hospital during the last year.       Current Medical Providers:  Patient Care Team:  Cecile Dennis APRN as PCP - General (Family Medicine)  Provider, No Known as PCP - Family Medicine    Outpatient Medications Prior to Visit   Medication Sig Dispense Refill   • atenolol (TENORMIN) 25 MG tablet Take 1 tablet by mouth Daily. 90 tablet 1   • buPROPion XL (WELLBUTRIN XL) 300 MG 24 hr tablet Take 1 tablet by mouth Daily. 90 tablet 1   • cholecalciferol (VITAMIN D3) 25 MCG (1000 UT) tablet Take 1,000 Units by mouth Daily.     • coenzyme Q10 100 MG capsule Take 1 tablet by mouth Daily.     • DULoxetine (CYMBALTA) 60 MG capsule TAKE ONE CAPSULE BY MOUTH TWICE A  capsule 1   • guaiFENesin-codeine (GUAIFENESIN AC) 100-10 MG/5ML liquid 5 mL at HS prn 118 mL 0   • hydroCHLOROthiazide (HYDRODIURIL) 25 MG tablet TAKE ONE TABLET BY MOUTH DAILY 90 tablet 1   • Januvia 100 MG tablet TAKE ONE TABLET BY MOUTH DAILY 90 tablet 1   • lisinopril (PRINIVIL,ZESTRIL) 10 MG tablet Take 1 tablet by mouth Daily. 90 tablet 1   • metFORMIN (GLUCOPHAGE) 500 MG tablet TAKE TWO TABLETS BY MOUTH TWICE A  tablet 1   • Multiple Vitamins-Minerals (EYE VITAMINS PO) Take 1 tablet by mouth Daily.     • Ozempic, 0.25 or 0.5 MG/DOSE, 2 MG/1.5ML solution pen-injector DIAL AND INJECT UNDER THE SKIN 0.5 MG WEEKLY 1.5 mL 4   • Premarin 0.625 MG/GM vaginal cream Insert 0.5grams into vagina daily for 3 weeks then off one week and repeat 60 g 0   • rosuvastatin (CRESTOR) 5 MG tablet TAKE ONE TABLET BY MOUTH DAILY 30 tablet 1   • SUMAtriptan (IMITREX) 50 MG tablet TAKE ONE TABLET BY MOUTH AT ONSET OF HEADACHE; MAY REPEAT ONE TABLET IN 2 HOURS IF NEEDED. MAX DOSE 200 MG IN 24  HOURS 9 tablet 2   • Synthroid 112 MCG tablet Take 1 tablet by mouth Daily. 90 tablet 1   • TiZANidine (ZANAFLEX) 4 MG capsule Take 1 capsule by mouth 3 (Three) Times a Day. (Patient taking differently: Take 4 mg by mouth 3 (Three) Times a Day As Needed.) 60 capsule 1   • traZODone (DESYREL) 50 MG tablet TAKE ONE TABLET BY MOUTH ONCE NIGHTLY AS NEEDED FOR SLEEP 90 tablet 0   • triamcinolone (KENALOG) 0.1 % cream Apply 1 application topically to the appropriate area as directed Take As Directed.     • Xiidra 5 % ophthalmic solution Apply 1 drop to eye(s) as directed by provider 2 (Two) Times a Day.       No facility-administered medications prior to visit.       No opioid medication identified on active medication list. I have reviewed chart for other potential  high risk medication/s and harmful drug interactions in the elderly.          Aspirin is not on active medication list.  Aspirin use is not indicated based on review of current medical condition/s. Risk of harm outweighs potential benefits.  .    Patient Active Problem List   Diagnosis   • Low back pain   • Hip pain, chronic, left   • Type 2 diabetes mellitus without complication (HCC)   • Hypothyroidism (acquired)   • Essential hypertension   • Anxiety disorder, unspecified   • Mixed hyperlipidemia   • Hip pain   • Flank pain   • RUQ abdominal pain   • Routine general medical examination at a health care facility   • Postmenopausal   • Nausea   • Immunization due   • History of sebaceous cyst   • Closed comedone     Advance Care Planning  Advance Directive is not on file.  ACP discussion was held with the patient during this visit. Patient does not have an advance directive, information provided.    Review of Systems   Constitutional: Negative for fatigue and fever.   HENT: Negative for sore throat.    Eyes: Negative for visual disturbance.   Respiratory: Negative for cough and shortness of breath.    Cardiovascular: Negative for chest pain, palpitations and  "leg swelling.   Gastrointestinal: Positive for nausea (with rx's ozempic and statin ). Negative for abdominal pain.   Genitourinary: Negative for difficulty urinating.   Musculoskeletal: Negative for arthralgias.   Skin: Negative for rash.        Cyst on back and black head    Hematological: Negative for adenopathy.   Psychiatric/Behavioral: Negative for sleep disturbance.        Objective    Vitals:    07/06/22 0929   BP: 94/63   BP Location: Left arm   Patient Position: Sitting   Pulse: 71   Weight: 82.6 kg (182 lb)     Estimated body mass index is 31 kg/m² as calculated from the following:    Height as of 11/15/21: 163.2 cm (64.25\").    Weight as of this encounter: 82.6 kg (182 lb).    {BMI Follow-up - 31.00. continue efforts with diet and regular exercise     Does the patient have evidence of cognitive impairment? No    Physical Exam  Vitals reviewed.   Constitutional:       Appearance: Normal appearance. She is well-developed.   HENT:      Head: Normocephalic and atraumatic.      Right Ear: External ear normal.      Left Ear: External ear normal.      Mouth/Throat:      Pharynx: No oropharyngeal exudate.   Eyes:      Conjunctiva/sclera: Conjunctivae normal.      Pupils: Pupils are equal, round, and reactive to light.   Cardiovascular:      Rate and Rhythm: Normal rate and regular rhythm.      Heart sounds: No murmur heard.    No friction rub. No gallop.   Pulmonary:      Effort: Pulmonary effort is normal.      Breath sounds: Normal breath sounds. No wheezing or rhonchi.   Chest:   Breasts:      Right: No mass, nipple discharge, skin change or axillary adenopathy.      Left: No mass, nipple discharge, skin change or axillary adenopathy.       Abdominal:      General: Bowel sounds are normal. There is no distension.      Palpations: Abdomen is soft.      Tenderness: There is no abdominal tenderness.   Lymphadenopathy:      Upper Body:      Right upper body: No axillary adenopathy.      Left upper body: No " axillary adenopathy.   Skin:     General: Skin is warm and dry.      Comments: Closed comedone to middle upper back, superior to area is a soft palpable 2 cm area, no redness    Neurological:      Mental Status: She is alert and oriented to person, place, and time.      Cranial Nerves: No cranial nerve deficit.   Psychiatric:         Mood and Affect: Mood and affect normal.         Behavior: Behavior normal.         Thought Content: Thought content normal.         Judgment: Judgment normal.                 HEALTH RISK ASSESSMENT    Smoking Status:  Social History     Tobacco Use   Smoking Status Former Smoker   • Packs/day: 0.50   • Years: 41.00   • Pack years: 20.50   • Types: Cigarettes   • Quit date:    • Years since quittin.5   Smokeless Tobacco Never Used     Alcohol Consumption:  Social History     Substance and Sexual Activity   Alcohol Use Defer     Fall Risk Screen:    MARIA ISABEL Fall Risk Assessment was completed, and patient is at LOW risk for falls.Assessment completed on:2022    Depression Screening:  PHQ-2/PHQ-9 Depression Screening 2022   Retired PHQ-9 Total Score -   Retired Total Score -   Little Interest or Pleasure in Doing Things 0-->not at all   Feeling Down, Depressed or Hopeless 0-->not at all   PHQ-9: Brief Depression Severity Measure Score 0       Health Habits and Functional and Cognitive Screening:  Functional & Cognitive Status 2022   Do you have difficulty preparing food and eating? No   Do you have difficulty bathing yourself, getting dressed or grooming yourself? No   Do you have difficulty using the toilet? No   Do you have difficulty moving around from place to place? No   Do you have trouble with steps or getting out of a bed or a chair? No   Current Diet Diabetic Diet   Dental Exam Up to date   Eye Exam Up to date   Exercise (times per week) 5 times per week   Current Exercises Include Stationary Bicycling/Spin Class   Do you need help using the phone?  No   Are  you deaf or do you have serious difficulty hearing?  Yes   Do you need help with transportation? No   Do you need help shopping? No   Do you need help preparing meals?  No   Do you need help with housework?  No   Do you need help with laundry? No   Do you need help taking your medications? No   Do you need help managing money? No   Do you ever drive or ride in a car without wearing a seat belt? No   Have you felt unusual stress, anger or loneliness in the last month? No   Who do you live with? Spouse   If you need help, do you have trouble finding someone available to you? No   Have you been bothered in the last four weeks by sexual problems? No   Do you have difficulty concentrating, remembering or making decisions? No       Age-appropriate Screening Schedule:  Refer to the list below for future screening recommendations based on patient's age, sex and/or medical conditions. Orders for these recommended tests are listed in the plan section. The patient has been provided with a written plan.    Health Maintenance   Topic Date Due   • DXA SCAN  Never done   • ZOSTER VACCINE (1 of 2) Never done   • DIABETIC FOOT EXAM  Never done   • INFLUENZA VACCINE  10/01/2022   • HEMOGLOBIN A1C  10/04/2022   • LIPID PANEL  11/29/2022   • URINE MICROALBUMIN  01/03/2023   • MAMMOGRAM  01/15/2023   • DIABETIC EYE EXAM  05/25/2023   • TDAP/TD VACCINES (2 - Td or Tdap) 05/11/2028              Assessment & Plan   CMS Preventative Services Quick Reference  Risk Factors Identified During Encounter  Immunizations Discussed/Encouraged (specific Immunizations; Prevnar 20 (Pneumococcal 20-valent conjugate), Shingrix and COVID19  The above risks/problems have been discussed with the patient.  Follow up actions/plans if indicated are seen below in the Assessment/Plan Section.  Pertinent information has been shared with the patient in the After Visit Summary.    Diagnoses and all orders for this visit:    1. Routine general medical examination at  a health care facility (Primary)  Assessment & Plan:  Advise regular exercise, healthy eating, always wear seat belts. Living will discussed, handout given, fall prevention discussed.  Immunizations discussed. Will update with pneum 20 today, advised to get covid booster and shingrex in future   To continue yearly optometry and dental exams.    Keep her upcoming mammogram appt         2. Postmenopausal  Assessment & Plan:  setting up dexa     Orders:  -     DEXA Bone Density Axial; Future    3. Mixed hyperlipidemia  Assessment & Plan:  Due lipids, getting other labs today for neph, CMP, CBC, A1C, urine      Orders:  -     Lipid panel; Future    4. Nausea  Assessment & Plan:  [place] of phenergan, she will take just at HS     Orders:  -     promethazine (PHENERGAN) 25 MG tablet; Take 1/2 to 1 tablet by mouth Every 6 (Six) Hours As Needed for Nausea  Dispense: 30 tablet; Refill: 1    5. Immunization due  -     Pneumococcal Conjugate Vaccine 20-Valent All    6. Closed comedone  Assessment & Plan:  She is going to make her own derm appt       7. History of sebaceous cyst  Assessment & Plan:  She is going to make her own derm appt      8. Hypothyroidism (acquired)  Assessment & Plan:  Reviewed last TSH       9. Type 2 diabetes mellitus without complication, without long-term current use of insulin (HCC)  Assessment & Plan:  Has lost weight with ozempic, getting labs today   To continue efforts with her diet and regular exercise         Follow Up:   Return for followup pending lab results.     An After Visit Summary and PPPS were made available to the patient.

## 2022-07-06 NOTE — ASSESSMENT & PLAN NOTE
Advise regular exercise, healthy eating, always wear seat belts. Living will discussed, handout given, fall prevention discussed.  Immunizations discussed. Will update with pneum 20 today, advised to get covid booster and shingrex in future   To continue yearly optometry and dental exams.    Keep her upcoming mammogram appt

## 2022-07-06 NOTE — ASSESSMENT & PLAN NOTE
Has lost weight with ozempic, getting labs today   To continue efforts with her diet and regular exercise

## 2022-07-17 DIAGNOSIS — I10 ESSENTIAL HYPERTENSION: ICD-10-CM

## 2022-07-18 ENCOUNTER — HOSPITAL ENCOUNTER (OUTPATIENT)
Dept: ULTRASOUND IMAGING | Facility: HOSPITAL | Age: 67
Discharge: HOME | End: 2022-07-18

## 2022-07-18 ENCOUNTER — HOSPITAL ENCOUNTER (OUTPATIENT)
Dept: BONE DENSITY | Facility: HOSPITAL | Age: 67
Discharge: HOME | End: 2022-07-18

## 2022-07-18 DIAGNOSIS — N18.31 STAGE 3A CHRONIC KIDNEY DISEASE: ICD-10-CM

## 2022-07-18 DIAGNOSIS — Z78.0 POSTMENOPAUSAL: ICD-10-CM

## 2022-07-18 DIAGNOSIS — I10 ESSENTIAL HYPERTENSION: ICD-10-CM

## 2022-07-18 PROCEDURE — 76775 US EXAM ABDO BACK WALL LIM: CPT

## 2022-07-18 PROCEDURE — 77080 DXA BONE DENSITY AXIAL: CPT

## 2022-07-18 RX ORDER — ATENOLOL 25 MG/1
TABLET ORAL
Qty: 90 TABLET | Refills: 1 | Status: SHIPPED | OUTPATIENT
Start: 2022-07-18 | End: 2023-01-10 | Stop reason: SDUPTHER

## 2022-07-19 DIAGNOSIS — R89.9 ABNORMAL LABORATORY TEST: Primary | ICD-10-CM

## 2022-07-21 ENCOUNTER — APPOINTMENT (OUTPATIENT)
Dept: MAMMOGRAPHY | Facility: HOSPITAL | Age: 67
End: 2022-07-21

## 2022-07-28 DIAGNOSIS — E78.2 MIXED HYPERLIPIDEMIA: ICD-10-CM

## 2022-07-28 RX ORDER — ROSUVASTATIN CALCIUM 5 MG/1
TABLET, COATED ORAL
Qty: 30 TABLET | Refills: 1 | Status: SHIPPED | OUTPATIENT
Start: 2022-07-28 | End: 2022-11-28

## 2022-07-29 ENCOUNTER — HOSPITAL ENCOUNTER (OUTPATIENT)
Dept: MAMMOGRAPHY | Facility: HOSPITAL | Age: 67
Discharge: HOME | End: 2022-07-29
Admitting: NURSE PRACTITIONER

## 2022-07-29 DIAGNOSIS — Z12.31 SCREENING MAMMOGRAM FOR BREAST CANCER: ICD-10-CM

## 2022-07-29 PROCEDURE — 77067 SCR MAMMO BI INCL CAD: CPT

## 2022-07-29 PROCEDURE — 77063 BREAST TOMOSYNTHESIS BI: CPT

## 2022-08-17 DIAGNOSIS — F41.3 OTHER MIXED ANXIETY DISORDERS: ICD-10-CM

## 2022-08-17 RX ORDER — BUPROPION HYDROCHLORIDE 300 MG/1
TABLET ORAL
Qty: 90 TABLET | Refills: 1 | Status: SHIPPED | OUTPATIENT
Start: 2022-08-17 | End: 2023-01-10 | Stop reason: SDUPTHER

## 2022-08-17 NOTE — TELEPHONE ENCOUNTER
Rx Refill Note  Requested Prescriptions     Pending Prescriptions Disp Refills   • buPROPion XL (WELLBUTRIN XL) 300 MG 24 hr tablet [Pharmacy Med Name: buPROPion HCL  MG TABLET] 90 tablet 1     Sig: TAKE ONE TABLET BY MOUTH DAILY      Last office visit with prescribing clinician: 7/6/2022      Next office visit with prescribing clinician: 1/10/2023    Adrienne Dela Cruz LPN  08/17/22, 10:53 EDT

## 2022-08-21 DIAGNOSIS — I10 ESSENTIAL HYPERTENSION: ICD-10-CM

## 2022-08-22 RX ORDER — LISINOPRIL 10 MG/1
TABLET ORAL
Qty: 90 TABLET | Refills: 1 | Status: SHIPPED | OUTPATIENT
Start: 2022-08-22 | End: 2023-01-10 | Stop reason: SDUPTHER

## 2022-08-25 ENCOUNTER — TELEPHONE (OUTPATIENT)
Dept: FAMILY MEDICINE CLINIC | Age: 67
End: 2022-08-25

## 2022-08-25 NOTE — TELEPHONE ENCOUNTER
----- Message from Minoo Nelson LPN sent at 8/24/2022  8:03 AM EDT -----      ----- Message -----  From: SYSTEM  Sent: 8/24/2022   1:22 AM EDT  To: [facility] [place]

## 2022-08-31 ENCOUNTER — LAB (OUTPATIENT)
Dept: LAB | Facility: HOSPITAL | Age: 67
End: 2022-08-31

## 2022-08-31 DIAGNOSIS — R89.9 ABNORMAL LABORATORY TEST: ICD-10-CM

## 2022-08-31 LAB
BASOPHILS # BLD AUTO: 0.03 10*3/MM3 (ref 0–0.2)
BASOPHILS NFR BLD AUTO: 0.4 % (ref 0–1.5)
DEPRECATED RDW RBC AUTO: 48.4 FL (ref 37–54)
EOSINOPHIL # BLD AUTO: 0.33 10*3/MM3 (ref 0–0.4)
EOSINOPHIL NFR BLD AUTO: 4.4 % (ref 0.3–6.2)
ERYTHROCYTE [DISTWIDTH] IN BLOOD BY AUTOMATED COUNT: 14.4 % (ref 12.3–15.4)
HCT VFR BLD AUTO: 38 % (ref 34–46.6)
HGB BLD-MCNC: 11.8 G/DL (ref 12–15.9)
IMM GRANULOCYTES # BLD AUTO: 0.01 10*3/MM3 (ref 0–0.05)
IMM GRANULOCYTES NFR BLD AUTO: 0.1 % (ref 0–0.5)
LYMPHOCYTES # BLD AUTO: 3.18 10*3/MM3 (ref 0.7–3.1)
LYMPHOCYTES NFR BLD AUTO: 42.7 % (ref 19.6–45.3)
MCH RBC QN AUTO: 28 PG (ref 26.6–33)
MCHC RBC AUTO-ENTMCNC: 31.1 G/DL (ref 31.5–35.7)
MCV RBC AUTO: 90.3 FL (ref 79–97)
MONOCYTES # BLD AUTO: 0.61 10*3/MM3 (ref 0.1–0.9)
MONOCYTES NFR BLD AUTO: 8.2 % (ref 5–12)
NEUTROPHILS NFR BLD AUTO: 3.28 10*3/MM3 (ref 1.7–7)
NEUTROPHILS NFR BLD AUTO: 44.2 % (ref 42.7–76)
PLATELET # BLD AUTO: 348 10*3/MM3 (ref 140–450)
PMV BLD AUTO: 9.8 FL (ref 6–12)
RBC # BLD AUTO: 4.21 10*6/MM3 (ref 3.77–5.28)
WBC NRBC COR # BLD: 7.44 10*3/MM3 (ref 3.4–10.8)

## 2022-08-31 PROCEDURE — 85025 COMPLETE CBC W/AUTO DIFF WBC: CPT

## 2022-08-31 PROCEDURE — 36415 COLL VENOUS BLD VENIPUNCTURE: CPT

## 2022-09-02 DIAGNOSIS — I10 ESSENTIAL HYPERTENSION: Primary | ICD-10-CM

## 2022-09-06 DIAGNOSIS — N95.9 MENOPAUSAL AND PERIMENOPAUSAL DISORDER: ICD-10-CM

## 2022-09-07 RX ORDER — CONJUGATED ESTROGENS 0.62 MG/G
CREAM VAGINAL
Qty: 60 G | Refills: 0 | Status: SHIPPED | OUTPATIENT
Start: 2022-09-07 | End: 2023-02-28

## 2022-09-07 NOTE — TELEPHONE ENCOUNTER
Rx Refill Note  Requested Prescriptions     Pending Prescriptions Disp Refills   • Premarin 0.625 MG/GM vaginal cream [Pharmacy Med Name: PREMARIN VAGINAL CREAM-APPL] 60 g 0     Sig: INSERT 0.5 GRAMS VAGINALLY DAILY FOR 3 WEEKS , THEN OFF 1 WEEK AND REPEAT      Last office visit with prescribing clinician: 7/6/2022      Next office visit with prescribing clinician: 1/10/2023     Mary Fowler LPN  09/07/22, 10:58 EDT     LF-5/9/22-60G  MAMMO-5/9/22

## 2022-09-10 DIAGNOSIS — G47.09 OTHER INSOMNIA: ICD-10-CM

## 2022-09-12 RX ORDER — TRAZODONE HYDROCHLORIDE 50 MG/1
TABLET ORAL
Qty: 30 TABLET | Refills: 0 | Status: SHIPPED | OUTPATIENT
Start: 2022-09-12

## 2022-09-27 ENCOUNTER — CLINICAL SUPPORT (OUTPATIENT)
Dept: FAMILY MEDICINE CLINIC | Age: 67
End: 2022-09-27

## 2022-09-27 DIAGNOSIS — Z23 NEED FOR VACCINATION: ICD-10-CM

## 2022-09-27 DIAGNOSIS — Z23 NEED FOR INFLUENZA VACCINATION: Primary | ICD-10-CM

## 2022-09-27 PROCEDURE — 0124A PR ADM SARSCOV2 30MCG/0.3ML BST: CPT | Performed by: FAMILY MEDICINE

## 2022-09-27 PROCEDURE — 90662 IIV NO PRSV INCREASED AG IM: CPT | Performed by: FAMILY MEDICINE

## 2022-09-27 PROCEDURE — 90471 IMMUNIZATION ADMIN: CPT | Performed by: FAMILY MEDICINE

## 2022-09-27 PROCEDURE — 91312 COVID-19 (PFIZER) BIVALENT BOOSTER 12+YRS: CPT | Performed by: FAMILY MEDICINE

## 2022-11-11 DIAGNOSIS — E11.9 TYPE 2 DIABETES MELLITUS WITHOUT COMPLICATION, WITHOUT LONG-TERM CURRENT USE OF INSULIN: Primary | ICD-10-CM

## 2022-11-11 DIAGNOSIS — E11.9 TYPE 2 DIABETES MELLITUS WITHOUT COMPLICATION, WITHOUT LONG-TERM CURRENT USE OF INSULIN: ICD-10-CM

## 2022-11-11 RX ORDER — SEMAGLUTIDE 1.34 MG/ML
1 INJECTION, SOLUTION SUBCUTANEOUS WEEKLY
Qty: 1.5 ML | Refills: 2 | Status: SHIPPED | OUTPATIENT
Start: 2022-11-11 | End: 2022-12-07 | Stop reason: ALTCHOICE

## 2022-11-13 DIAGNOSIS — E03.9 HYPOTHYROIDISM (ACQUIRED): ICD-10-CM

## 2022-11-14 RX ORDER — LEVOTHYROXINE SODIUM 112 MCG
TABLET ORAL
Qty: 90 TABLET | Refills: 0 | Status: SHIPPED | OUTPATIENT
Start: 2022-11-14 | End: 2023-01-10 | Stop reason: SDUPTHER

## 2022-11-14 NOTE — TELEPHONE ENCOUNTER
Rx Refill Note  Requested Prescriptions     Pending Prescriptions Disp Refills   • Synthroid 112 MCG tablet [Pharmacy Med Name: SYNTHROID 112 MCG TABLET] 90 tablet 1     Sig: TAKE ONE TABLET BY MOUTH DAILY      Last office visit with prescribing clinician: 7/6/2022      Next office visit with prescribing clinician: 1/10/2023       Marnie Mendoza LPN  11/14/22, 09:54 EST

## 2022-11-20 DIAGNOSIS — I10 ESSENTIAL HYPERTENSION: ICD-10-CM

## 2022-11-21 RX ORDER — HYDROCHLOROTHIAZIDE 25 MG/1
TABLET ORAL
Qty: 90 TABLET | Refills: 0 | Status: SHIPPED | OUTPATIENT
Start: 2022-11-21 | End: 2023-01-10 | Stop reason: SDUPTHER

## 2022-11-22 ENCOUNTER — LAB (OUTPATIENT)
Dept: LAB | Facility: HOSPITAL | Age: 67
End: 2022-11-22

## 2022-11-22 ENCOUNTER — TRANSCRIBE ORDERS (OUTPATIENT)
Dept: ADMINISTRATIVE | Facility: HOSPITAL | Age: 67
End: 2022-11-22

## 2022-11-22 DIAGNOSIS — I10 ESSENTIAL HYPERTENSION: ICD-10-CM

## 2022-11-22 DIAGNOSIS — I10 ESSENTIAL HYPERTENSION, MALIGNANT: ICD-10-CM

## 2022-11-22 DIAGNOSIS — E11.9 TYPE 2 DIABETES MELLITUS WITHOUT COMPLICATION, WITHOUT LONG-TERM CURRENT USE OF INSULIN: ICD-10-CM

## 2022-11-22 DIAGNOSIS — N18.2 CHRONIC KIDNEY DISEASE, STAGE II (MILD): Primary | ICD-10-CM

## 2022-11-22 DIAGNOSIS — N18.2 CHRONIC KIDNEY DISEASE, STAGE II (MILD): ICD-10-CM

## 2022-11-22 LAB
ALBUMIN SERPL-MCNC: 4 G/DL (ref 3.5–5.2)
ALBUMIN UR-MCNC: <1.2 MG/DL
ALBUMIN/GLOB SERPL: 1.6 G/DL
ALP SERPL-CCNC: 106 U/L (ref 39–117)
ALT SERPL W P-5'-P-CCNC: 12 U/L (ref 1–33)
ANION GAP SERPL CALCULATED.3IONS-SCNC: 8 MMOL/L (ref 5–15)
AST SERPL-CCNC: 17 U/L (ref 1–32)
BACTERIA UR QL AUTO: ABNORMAL /HPF
BASOPHILS # BLD AUTO: 0.02 10*3/MM3 (ref 0–0.2)
BASOPHILS NFR BLD AUTO: 0.3 % (ref 0–1.5)
BILIRUB SERPL-MCNC: 0.2 MG/DL (ref 0–1.2)
BILIRUB UR QL STRIP: NEGATIVE
BUN SERPL-MCNC: 17 MG/DL (ref 8–23)
BUN/CREAT SERPL: 14.9 (ref 7–25)
CALCIUM SPEC-SCNC: 9.7 MG/DL (ref 8.6–10.5)
CHLORIDE SERPL-SCNC: 102 MMOL/L (ref 98–107)
CLARITY UR: CLEAR
CO2 SERPL-SCNC: 28 MMOL/L (ref 22–29)
COD CRY URNS QL: ABNORMAL /HPF
COLOR UR: YELLOW
CREAT SERPL-MCNC: 1.14 MG/DL (ref 0.57–1)
CREAT UR-MCNC: 122.7 MG/DL
DEPRECATED RDW RBC AUTO: 47.6 FL (ref 37–54)
EGFRCR SERPLBLD CKD-EPI 2021: 53.2 ML/MIN/1.73
EOSINOPHIL # BLD AUTO: 0.27 10*3/MM3 (ref 0–0.4)
EOSINOPHIL NFR BLD AUTO: 3.5 % (ref 0.3–6.2)
ERYTHROCYTE [DISTWIDTH] IN BLOOD BY AUTOMATED COUNT: 14 % (ref 12.3–15.4)
GLOBULIN UR ELPH-MCNC: 2.5 GM/DL
GLUCOSE SERPL-MCNC: 135 MG/DL (ref 65–99)
GLUCOSE UR STRIP-MCNC: NEGATIVE MG/DL
HBA1C MFR BLD: 7 % (ref 4.8–5.6)
HCT VFR BLD AUTO: 38.9 % (ref 34–46.6)
HGB BLD-MCNC: 12.2 G/DL (ref 12–15.9)
HGB UR QL STRIP.AUTO: NEGATIVE
IMM GRANULOCYTES # BLD AUTO: 0.01 10*3/MM3 (ref 0–0.05)
IMM GRANULOCYTES NFR BLD AUTO: 0.1 % (ref 0–0.5)
KETONES UR QL STRIP: NEGATIVE
LEUKOCYTE ESTERASE UR QL STRIP.AUTO: ABNORMAL
LYMPHOCYTES # BLD AUTO: 3.04 10*3/MM3 (ref 0.7–3.1)
LYMPHOCYTES NFR BLD AUTO: 39.8 % (ref 19.6–45.3)
MCH RBC QN AUTO: 28.8 PG (ref 26.6–33)
MCHC RBC AUTO-ENTMCNC: 31.4 G/DL (ref 31.5–35.7)
MCV RBC AUTO: 91.7 FL (ref 79–97)
MICROALBUMIN/CREAT UR: NORMAL MG/G{CREAT}
MONOCYTES # BLD AUTO: 0.52 10*3/MM3 (ref 0.1–0.9)
MONOCYTES NFR BLD AUTO: 6.8 % (ref 5–12)
NEUTROPHILS NFR BLD AUTO: 3.78 10*3/MM3 (ref 1.7–7)
NEUTROPHILS NFR BLD AUTO: 49.5 % (ref 42.7–76)
NITRITE UR QL STRIP: NEGATIVE
PH UR STRIP.AUTO: 6 [PH] (ref 5–8)
PLATELET # BLD AUTO: 353 10*3/MM3 (ref 140–450)
PMV BLD AUTO: 10 FL (ref 6–12)
POTASSIUM SERPL-SCNC: 4.4 MMOL/L (ref 3.5–5.2)
PROT SERPL-MCNC: 6.5 G/DL (ref 6–8.5)
PROT UR QL STRIP: NEGATIVE
RBC # BLD AUTO: 4.24 10*6/MM3 (ref 3.77–5.28)
RBC # UR STRIP: ABNORMAL /HPF
REF LAB TEST METHOD: ABNORMAL
SODIUM SERPL-SCNC: 138 MMOL/L (ref 136–145)
SP GR UR STRIP: 1.02 (ref 1–1.03)
SQUAMOUS #/AREA URNS HPF: ABNORMAL /HPF
UROBILINOGEN UR QL STRIP: ABNORMAL
WBC # UR STRIP: ABNORMAL /HPF
WBC NRBC COR # BLD: 7.64 10*3/MM3 (ref 3.4–10.8)

## 2022-11-22 PROCEDURE — 83036 HEMOGLOBIN GLYCOSYLATED A1C: CPT

## 2022-11-22 PROCEDURE — 80053 COMPREHEN METABOLIC PANEL: CPT

## 2022-11-22 PROCEDURE — 81001 URINALYSIS AUTO W/SCOPE: CPT

## 2022-11-22 PROCEDURE — 82043 UR ALBUMIN QUANTITATIVE: CPT

## 2022-11-22 PROCEDURE — 36415 COLL VENOUS BLD VENIPUNCTURE: CPT

## 2022-11-22 PROCEDURE — 85025 COMPLETE CBC W/AUTO DIFF WBC: CPT

## 2022-11-22 PROCEDURE — 82570 ASSAY OF URINE CREATININE: CPT

## 2022-11-27 DIAGNOSIS — E78.2 MIXED HYPERLIPIDEMIA: ICD-10-CM

## 2022-11-28 RX ORDER — ROSUVASTATIN CALCIUM 5 MG/1
TABLET, COATED ORAL
Qty: 30 TABLET | Refills: 1 | Status: SHIPPED | OUTPATIENT
Start: 2022-11-28 | End: 2023-01-10 | Stop reason: SDUPTHER

## 2022-12-06 DIAGNOSIS — F41.3 OTHER MIXED ANXIETY DISORDERS: ICD-10-CM

## 2022-12-06 DIAGNOSIS — E11.9 TYPE 2 DIABETES MELLITUS WITHOUT COMPLICATION, WITHOUT LONG-TERM CURRENT USE OF INSULIN: ICD-10-CM

## 2022-12-06 DIAGNOSIS — E11.9 TYPE 2 DIABETES MELLITUS WITHOUT COMPLICATION, WITHOUT LONG-TERM CURRENT USE OF INSULIN: Primary | ICD-10-CM

## 2022-12-06 RX ORDER — SITAGLIPTIN 100 MG/1
TABLET, FILM COATED ORAL
Qty: 90 TABLET | Refills: 0 | Status: SHIPPED | OUTPATIENT
Start: 2022-12-06 | End: 2023-01-10 | Stop reason: ALTCHOICE

## 2022-12-06 RX ORDER — DULOXETIN HYDROCHLORIDE 60 MG/1
CAPSULE, DELAYED RELEASE ORAL
Qty: 180 CAPSULE | Refills: 0 | Status: SHIPPED | OUTPATIENT
Start: 2022-12-06 | End: 2023-01-10 | Stop reason: SDUPTHER

## 2022-12-06 RX ORDER — TRIAMCINOLONE ACETONIDE 1 MG/G
CREAM TOPICAL
Qty: 30 G | OUTPATIENT
Start: 2022-12-06

## 2022-12-06 NOTE — TELEPHONE ENCOUNTER
Rx Refill Note  Requested Prescriptions     Pending Prescriptions Disp Refills   • Januvia 100 MG tablet [Pharmacy Med Name: JANUVIA 100 MG TABLET] 90 tablet 0     Sig: TAKE ONE TABLET BY MOUTH DAILY   • DULoxetine (CYMBALTA) 60 MG capsule [Pharmacy Med Name: DULOXETINE HCL DR 60 MG CAPSULE] 180 capsule 0     Sig: TAKE ONE CAPSULE BY MOUTH TWICE A DAY   • metFORMIN (GLUCOPHAGE) 500 MG tablet [Pharmacy Med Name: metFORMIN  MG TABLET] 360 tablet 0     Sig: TAKE TWO TABLETS BY MOUTH TWICE A DAY   • triamcinolone (KENALOG) 0.1 % cream [Pharmacy Med Name: TRIAMCINOLONE 0.1% CREAM] 30 g      Sig: APPLY A THIN LAYER TO AFFECTED AREA TWO TIMES A DAY      Last office visit with prescribing clinician: 7/6/2022     Next office visit with prescribing clinician: 1/10/2023      Marnie Mendoza LPN  12/06/22, 15:41 EST

## 2022-12-06 NOTE — TELEPHONE ENCOUNTER
----- Message from SHANNA Felix sent at 12/6/2022  1:17 PM EST -----  Regarding: FW: Ozempic  Contact: [phone]        ----- Message -----  From: Marnie Mendoza LPN  Sent: 12/5/2022   3:49 PM EST  To: SHANNA Felix  Subject: FW: Ozempic                                        ----- Message -----  From: Aisha Hines LPN  Sent: 12/5/2022   3:42 PM EST  To: [facility]  Subject: FW: Ozempic                                        ----- Message -----  From: Guillermina Gardner  Sent: 12/5/2022   3:20 PM EST  To: [facility] [place] [facility]  Subject: Ozempic                                          As I will be unable to get Ozempic until sometime after the first of the year I wondered if I could go back to [facility] until my visit with you in January. I see nephrologist on same day. Since it’s 30 days at 3 mg at least I will have something until visit. I just don’t want to feel bad through the holidays. Thanks. Guillermina

## 2022-12-07 DIAGNOSIS — E11.9 TYPE 2 DIABETES MELLITUS WITHOUT COMPLICATION, WITHOUT LONG-TERM CURRENT USE OF INSULIN: ICD-10-CM

## 2022-12-07 RX ORDER — SEMAGLUTIDE 1.34 MG/ML
1 INJECTION, SOLUTION SUBCUTANEOUS WEEKLY
Qty: 1.5 ML | Refills: 2 | Status: CANCELLED | OUTPATIENT
Start: 2022-12-07

## 2022-12-07 NOTE — TELEPHONE ENCOUNTER
----- Message from SHANNA Felix sent at 12/7/2022 11:20 AM EST -----  Regarding: JHONATHAN: Ozempic  Contact: [phone]  You will need to send to me as an encounter or refill so I can review and sign, I don't see  a  ----- Message -----  From: Marnie Mendoza LPN  Sent: 12/6/2022   2:27 PM EST  To: SHANNA Felix  Subject: FW: Ozempic                                            ----- Message -----  From: Cecile Dennis APRN  Sent: 12/6/2022   1:17 PM EST  To: [facility]  Subject: FW: Ozempic                                            ----- Message -----  From: Marnie Mendoza LPN  Sent: 12/5/2022   3:49 PM EST  To: SHANNA Felix  Subject: FW: Ozempic                                        ----- Message -----  From: Aisha Hines LPN  Sent: 12/5/2022   3:42 PM EST  To: [facility]  Subject: FW: Ozempic                                        ----- Message -----  From: Guillermina Gardner  Sent: 12/5/2022   3:20 PM EST  To: [facility] [place] [facility]  Subject: Ozempic                                          As I will be unable to get Ozempic until sometime after the first of the year I wondered if I could go back to [facility] until my visit with you in January. I see nephrologist on same day. Since it’s 30 days at 3 mg at least I will have something until visit. I just don’t want to feel bad through the holidays. Thanks. Guillermina

## 2023-01-10 ENCOUNTER — OFFICE VISIT (OUTPATIENT)
Dept: FAMILY MEDICINE CLINIC | Age: 68
End: 2023-01-10
Payer: COMMERCIAL

## 2023-01-10 ENCOUNTER — LAB (OUTPATIENT)
Dept: LAB | Facility: HOSPITAL | Age: 68
End: 2023-01-10
Payer: COMMERCIAL

## 2023-01-10 VITALS
WEIGHT: 180.4 LBS | DIASTOLIC BLOOD PRESSURE: 77 MMHG | HEART RATE: 71 BPM | HEIGHT: 64 IN | BODY MASS INDEX: 30.8 KG/M2 | OXYGEN SATURATION: 97 % | SYSTOLIC BLOOD PRESSURE: 118 MMHG | TEMPERATURE: 98.3 F

## 2023-01-10 DIAGNOSIS — E11.9 TYPE 2 DIABETES MELLITUS WITHOUT COMPLICATION, WITHOUT LONG-TERM CURRENT USE OF INSULIN: ICD-10-CM

## 2023-01-10 DIAGNOSIS — F41.3 OTHER MIXED ANXIETY DISORDERS: ICD-10-CM

## 2023-01-10 DIAGNOSIS — E03.9 HYPOTHYROIDISM (ACQUIRED): ICD-10-CM

## 2023-01-10 DIAGNOSIS — R30.0 DYSURIA: Primary | ICD-10-CM

## 2023-01-10 DIAGNOSIS — E78.2 MIXED HYPERLIPIDEMIA: ICD-10-CM

## 2023-01-10 DIAGNOSIS — R11.0 NAUSEA: ICD-10-CM

## 2023-01-10 DIAGNOSIS — I10 ESSENTIAL HYPERTENSION: ICD-10-CM

## 2023-01-10 LAB
ALBUMIN SERPL-MCNC: 4.1 G/DL (ref 3.5–5.2)
ALBUMIN/GLOB SERPL: 1.5 G/DL
ALP SERPL-CCNC: 84 U/L (ref 39–117)
ALT SERPL W P-5'-P-CCNC: 13 U/L (ref 1–33)
ANION GAP SERPL CALCULATED.3IONS-SCNC: 11.2 MMOL/L (ref 5–15)
AST SERPL-CCNC: 17 U/L (ref 1–32)
BACTERIA UR QL AUTO: ABNORMAL /HPF
BILIRUB SERPL-MCNC: 0.3 MG/DL (ref 0–1.2)
BILIRUB UR QL STRIP: NEGATIVE
BUN SERPL-MCNC: 24 MG/DL (ref 8–23)
BUN/CREAT SERPL: 19 (ref 7–25)
CALCIUM SPEC-SCNC: 9.8 MG/DL (ref 8.6–10.5)
CHLORIDE SERPL-SCNC: 100 MMOL/L (ref 98–107)
CHOLEST SERPL-MCNC: 201 MG/DL (ref 0–200)
CLARITY UR: ABNORMAL
CO2 SERPL-SCNC: 28.8 MMOL/L (ref 22–29)
COLOR UR: YELLOW
CREAT SERPL-MCNC: 1.26 MG/DL (ref 0.57–1)
EGFRCR SERPLBLD CKD-EPI 2021: 47.2 ML/MIN/1.73
GLOBULIN UR ELPH-MCNC: 2.7 GM/DL
GLUCOSE SERPL-MCNC: 148 MG/DL (ref 65–99)
GLUCOSE UR STRIP-MCNC: NEGATIVE MG/DL
HDLC SERPL-MCNC: 61 MG/DL (ref 40–60)
HGB UR QL STRIP.AUTO: ABNORMAL
KETONES UR QL STRIP: NEGATIVE
LDLC SERPL CALC-MCNC: 120 MG/DL (ref 0–100)
LDLC/HDLC SERPL: 1.93 {RATIO}
LEUKOCYTE ESTERASE UR QL STRIP.AUTO: ABNORMAL
NITRITE UR QL STRIP: NEGATIVE
PH UR STRIP.AUTO: 6 [PH] (ref 5–8)
POTASSIUM SERPL-SCNC: 4.6 MMOL/L (ref 3.5–5.2)
PROT SERPL-MCNC: 6.8 G/DL (ref 6–8.5)
PROT UR QL STRIP: NEGATIVE
RBC # UR STRIP: ABNORMAL /HPF
REF LAB TEST METHOD: ABNORMAL
SODIUM SERPL-SCNC: 140 MMOL/L (ref 136–145)
SP GR UR STRIP: 1.02 (ref 1–1.03)
SQUAMOUS #/AREA URNS HPF: ABNORMAL /HPF
TRIGL SERPL-MCNC: 110 MG/DL (ref 0–150)
TSH SERPL DL<=0.05 MIU/L-ACNC: 3.13 UIU/ML (ref 0.27–4.2)
UROBILINOGEN UR QL STRIP: ABNORMAL
VLDLC SERPL-MCNC: 20 MG/DL (ref 5–40)
WBC # UR STRIP: ABNORMAL /HPF

## 2023-01-10 PROCEDURE — 87086 URINE CULTURE/COLONY COUNT: CPT | Performed by: NURSE PRACTITIONER

## 2023-01-10 PROCEDURE — 84443 ASSAY THYROID STIM HORMONE: CPT | Performed by: NURSE PRACTITIONER

## 2023-01-10 PROCEDURE — 87186 SC STD MICRODIL/AGAR DIL: CPT | Performed by: NURSE PRACTITIONER

## 2023-01-10 PROCEDURE — 81001 URINALYSIS AUTO W/SCOPE: CPT | Performed by: NURSE PRACTITIONER

## 2023-01-10 PROCEDURE — 87077 CULTURE AEROBIC IDENTIFY: CPT | Performed by: NURSE PRACTITIONER

## 2023-01-10 PROCEDURE — 80061 LIPID PANEL: CPT | Performed by: NURSE PRACTITIONER

## 2023-01-10 PROCEDURE — 80053 COMPREHEN METABOLIC PANEL: CPT | Performed by: NURSE PRACTITIONER

## 2023-01-10 PROCEDURE — 36415 COLL VENOUS BLD VENIPUNCTURE: CPT | Performed by: NURSE PRACTITIONER

## 2023-01-10 PROCEDURE — 99214 OFFICE O/P EST MOD 30 MIN: CPT | Performed by: NURSE PRACTITIONER

## 2023-01-10 RX ORDER — BUPROPION HYDROCHLORIDE 300 MG/1
300 TABLET ORAL DAILY
Qty: 90 TABLET | Refills: 1 | Status: SHIPPED | OUTPATIENT
Start: 2023-01-10

## 2023-01-10 RX ORDER — LEVOTHYROXINE SODIUM 112 MCG
112 TABLET ORAL DAILY
Qty: 90 TABLET | Refills: 1 | Status: SHIPPED | OUTPATIENT
Start: 2023-01-10

## 2023-01-10 RX ORDER — LISINOPRIL 10 MG/1
10 TABLET ORAL DAILY
Qty: 90 TABLET | Refills: 1 | Status: SHIPPED | OUTPATIENT
Start: 2023-01-10

## 2023-01-10 RX ORDER — PROMETHAZINE HYDROCHLORIDE 25 MG/1
25 TABLET ORAL EVERY 6 HOURS PRN
Qty: 30 TABLET | Refills: 1 | Status: SHIPPED | OUTPATIENT
Start: 2023-01-10

## 2023-01-10 RX ORDER — DULOXETIN HYDROCHLORIDE 60 MG/1
60 CAPSULE, DELAYED RELEASE ORAL 2 TIMES DAILY
Qty: 180 CAPSULE | Refills: 1 | Status: SHIPPED | OUTPATIENT
Start: 2023-01-10

## 2023-01-10 RX ORDER — ROSUVASTATIN CALCIUM 5 MG/1
5 TABLET, COATED ORAL DAILY
Qty: 90 TABLET | Refills: 1 | Status: SHIPPED | OUTPATIENT
Start: 2023-01-10

## 2023-01-10 RX ORDER — ATENOLOL 25 MG/1
25 TABLET ORAL DAILY
Qty: 90 TABLET | Refills: 1 | Status: SHIPPED | OUTPATIENT
Start: 2023-01-10

## 2023-01-10 RX ORDER — HYDROCHLOROTHIAZIDE 25 MG/1
25 TABLET ORAL DAILY
Qty: 90 TABLET | Refills: 1 | Status: SHIPPED | OUTPATIENT
Start: 2023-01-10 | End: 2023-02-20

## 2023-01-10 NOTE — ASSESSMENT & PLAN NOTE
Hypertension is stable.  to monitor BP at home. Continue current meds. Continue to modify diet and lifestyle. Sent in the 25 mg, but she was dropping a little low, so is only taking 12.5 mg of HCTZ

## 2023-01-10 NOTE — ASSESSMENT & PLAN NOTE
UTI treatment: pending u/a  If needed will send rx to [facility] pharmacy

## 2023-01-10 NOTE — TELEPHONE ENCOUNTER
Rx Refill Note  Requested Prescriptions     Pending Prescriptions Disp Refills   • promethazine (PHENERGAN) 25 MG tablet 30 tablet 1     Sig: Take 1/2 to 1 tablet by mouth Every 6 (Six) Hours As Needed for Nausea      Last office visit with prescribing clinician: 1/10/2023   Last telemedicine visit with prescribing clinician: Visit date not found   Next office visit with prescribing clinician: Visit date not found     Mary Fowler LPN  01/10/23, 09:23 EST     LF-7/6/22 #30, RF-1

## 2023-01-10 NOTE — PROGRESS NOTES
Guillermina Gardner presents to [facility].    Chief Complaint:  Hyperlipidemia, Hypertension, Diabetes, and Hypothyroidism (6 months )         History of Present Illness:  Diabetes:  Current medication: metformin, ozempic 1 mg weekly (out due to not able to get refills) seeing endo now through [facility]   Tolerating medication: Yes  Last eye exam: UTD   Last foot exam: > 1 year   At home BS ranges: now has an Fredrick, 120's   Lab Results       Component                Value               Date                       HGBA1C                   7.00 (H)            11/22/2022              Hypothyroidism  Current rx: synthroid 112   Tolerating rx: yes   Refills needed yes   Lab Results       Component                Value               Date                       TSH                      3.710               04/04/2022              Hyperlipidemia  Current medication: crestor   Tolerating medication: most of the time, but has some GI issues with combination of rx's   Needs Refill: Yes    Lab Results       Component                Value               Date                       CHOL                     159                 07/06/2022                 CHLPL                    148                 04/21/2021                 TRIG                     89                  07/06/2022                 HDL                      61 (H)              07/06/2022                 LDL                      81                  07/06/2022              Hypertension:  Current medication: atenolol, HCTZ 12. 5 mg, Lisinopril   Tolerating Medication: Yes    Needs refills: Yes  Labs:  Lab Results       Component                Value               Date                       GLUCOSE                  135 (H)             11/22/2022                 BUN                      17                  11/22/2022                 CREATININE               1.14 (H)            11/22/2022                 EGFRIFNONA               62                   2022                 BCR                      14.9                2022                 K                        4.4                 2022                 CO2                      28.0                2022                 CALCIUM                  9.7                 2022                 ALBUMIN                  4.00                2022                 LABIL2                   1.4                 2021                 AST                      17                  2022                 ALT                      12                  2022           Anxiety/ Depression/insomnia   Current medication/cymbalta, wellbutrin, trazodone (takes prn)    Tolerating medication Yes  Stressors: her  fell off a ladder recently has a fractured tibia, having to help care for him        PAST MEDICAL HISTORY changes since :         covid + 2-    Dizziness - Seen Dr. Almaguer         GYNECOLOGICAL HISTORY:             PREVENTIVE HEALTH MAINTENANCE             Hepatitis C Medicare Screening: was last done 3-17-18; negative     MAMMOGRAM: Done within last 2 years and results in are chart was last done 01/15/21 stable         Surgical History:         : X 2;     Lap Band ; Procedures: Ablation     Positive for    thumb joint left replaced/repaired 8-6-15; and    right thumb replaced 18;;     COLONOSCOPY: was last done 2016 with normal results Dr Rosa Forte         Family History:     Father:  at age 89;  Type 2 Diabetes; Hypothyroidism;  Macular degeneration     Mother:  at age 90;  Hypertension;  Dementia     Sister(s): Hypertension; Endocrine Hypothyroidism         Social History:     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        Weight loss of 25 pounds with  ozempic and having covid 2-1-22   Respiratory: Negative for cough and shortness of breath.    Cardiovascular: Negative for chest pain, palpitations and leg swelling.   Genitourinary: Positive for dysuria (1-2 weeks ).   Neurological: Negative for numbness.          Current Outpatient Medications:   •  atenolol (TENORMIN) 25 MG tablet, Take 1 tablet by mouth Daily., Disp: 90 tablet, Rfl: 1  •  buPROPion XL (WELLBUTRIN XL) 300 MG 24 hr tablet, Take 1 tablet by mouth Daily., Disp: 90 tablet, Rfl: 1  •  cholecalciferol (VITAMIN D3) 25 MCG (1000 UT) tablet, Take 1,000 Units by mouth Daily., Disp: , Rfl:   •  coenzyme Q10 100 MG capsule, Take 1 tablet by mouth Daily., Disp: , Rfl:   •  DULoxetine (CYMBALTA) 60 MG capsule, Take 1 capsule by mouth 2 (Two) Times a Day., Disp: 180 capsule, Rfl: 1  •  hydroCHLOROthiazide (HYDRODIURIL) 25 MG tablet, Take 1 tablet by mouth Daily., Disp: 90 tablet, Rfl: 1  •  lisinopril (PRINIVIL,ZESTRIL) 10 MG tablet, Take 1 tablet by mouth Daily., Disp: 90 tablet, Rfl: 1  •  metFORMIN (GLUCOPHAGE) 500 MG tablet, Take 2 tablets by mouth 2 (Two) Times a Day., Disp: 360 tablet, Rfl: 1  •  Multiple Vitamins-Minerals (EYE VITAMINS PO), Take 1 tablet by mouth Daily., Disp: , Rfl:   •  Premarin 0.625 MG/GM vaginal cream, INSERT 0.5 GRAMS VAGINALLY DAILY FOR 3 WEEKS , THEN OFF 1 WEEK AND REPEAT, Disp: 60 g, Rfl: 0  •  rosuvastatin (CRESTOR) 5 MG tablet, Take 1 tablet by mouth Daily., Disp: 90 tablet, Rfl: 1  •  Semaglutide 3 MG tablet, Take 1 tablet by mouth Daily., Disp: 30 tablet, Rfl: 0  •  Semaglutide, 1 MG/DOSE, (OZEMPIC) 2 MG/1.5ML solution pen-injector, Inject  under the skin into the appropriate area as directed 1 (One) Time Per Week., Disp: , Rfl:   •  SUMAtriptan (IMITREX) 50 MG tablet, TAKE ONE TABLET BY MOUTH AT ONSET OF HEADACHE; MAY REPEAT ONE TABLET IN 2 HOURS IF NEEDED. MAX DOSE 200 MG IN 24 HOURS, Disp: 9 tablet, Rfl: 2  •  Synthroid 112 MCG tablet, Take 1 tablet by mouth Daily., Disp:  90 tablet, Rfl: 1  •  traZODone (DESYREL) 50 MG tablet, TAKE 1 TABLET BY MOUTH ONCE NIGHTLY AS NEEDED FOR SLEEP, Disp: 30 tablet, Rfl: 0  •  triamcinolone (KENALOG) 0.1 % cream, Apply 1 application topically to the appropriate area as directed Take As Directed., Disp: , Rfl:   •  Xiidra 5 % ophthalmic solution, Apply 1 drop to eye(s) as directed by provider 2 (Two) Times a Day., Disp: , Rfl:   •  promethazine (PHENERGAN) 25 MG tablet, Take 1/2 to 1 tablet by mouth Every 6 (Six) Hours As Needed for Nausea, Disp: 30 tablet, Rfl: 1  •  TiZANidine (ZANAFLEX) 4 MG capsule, Take 1 capsule by mouth 3 (Three) Times a Day. (Patient taking differently: Take 4 mg by mouth 3 (Three) Times a Day As Needed.), Disp: 60 capsule, Rfl: 1    Vital Signs:   Vitals:    01/10/23 0847   BP: 118/77   BP Location: Left arm   Patient Position: Sitting   Cuff Size: Adult   Pulse: 71   Temp: 98.3 °F (36.8 °C)   TempSrc: Oral   SpO2: 97%  Comment: room air   Weight: 81.8 kg (180 lb 6.4 oz)   Height: 163.2 cm (64.25\")         Physical Exam:  Physical Exam  Constitutional:       Appearance: Normal appearance.   Neck:      Vascular: No carotid bruit.   Cardiovascular:      Rate and Rhythm: Normal rate and regular rhythm.      Pulses:           Posterior tibial pulses are 2+ on the right side and 2+ on the left side.      Heart sounds: No murmur heard.  Pulmonary:      Effort: No respiratory distress.      Breath sounds: Normal breath sounds.   Musculoskeletal:         General: No swelling.   Feet:      Right foot:      Protective Sensation: 3 sites tested. 3 sites sensed.      Skin integrity: Dry skin present. No ulcer or blister.      Toenail Condition: Right toenails are normal.      Left foot:      Protective Sensation: 3 sites tested. 3 sites sensed.      Skin integrity: Callus (tiny one on lateral fifth toe ) and dry skin present. No ulcer or blister.      Toenail Condition: Left toenails are normal.      Comments: Diabetic Foot Exam  Performed and Monofilament Test Performed     Neurological:      Mental Status: She is alert.   Psychiatric:         Mood and Affect: Mood normal.         Thought Content: Thought content normal.         Result Review      The following data was reviewed by: SHANNA Felix on 01/10/2023:    Results for orders placed or performed in visit on 11/22/22   Hemoglobin A1c    Specimen: Blood   Result Value Ref Range    Hemoglobin A1C 7.00 (H) 4.80 - 5.60 %   Comprehensive Metabolic Panel    Specimen: Blood   Result Value Ref Range    Glucose 135 (H) 65 - 99 mg/dL    BUN 17 8 - 23 mg/dL    Creatinine 1.14 (H) 0.57 - 1.00 mg/dL    Sodium 138 136 - 145 mmol/L    Potassium 4.4 3.5 - 5.2 mmol/L    Chloride 102 98 - 107 mmol/L    CO2 28.0 22.0 - 29.0 mmol/L    Calcium 9.7 8.6 - 10.5 mg/dL    Total Protein 6.5 6.0 - 8.5 g/dL    Albumin 4.00 3.50 - 5.20 g/dL    ALT (SGPT) 12 1 - 33 U/L    AST (SGOT) 17 1 - 32 U/L    Alkaline Phosphatase 106 39 - 117 U/L    Total Bilirubin 0.2 0.0 - 1.2 mg/dL    Globulin 2.5 gm/dL    A/G Ratio 1.6 g/dL    BUN/Creatinine Ratio 14.9 7.0 - 25.0    Anion Gap 8.0 5.0 - 15.0 mmol/L    eGFR 53.2 (L) >60.0 mL/min/1.73   CBC Auto Differential    Specimen: Blood   Result Value Ref Range    WBC 7.64 3.40 - 10.80 10*3/mm3    RBC 4.24 3.77 - 5.28 10*6/mm3    Hemoglobin 12.2 12.0 - 15.9 g/dL    Hematocrit 38.9 34.0 - 46.6 %    MCV 91.7 79.0 - 97.0 fL    MCH 28.8 26.6 - 33.0 pg    MCHC 31.4 (L) 31.5 - 35.7 g/dL    RDW 14.0 12.3 - 15.4 %    RDW-SD 47.6 37.0 - 54.0 fl    MPV 10.0 6.0 - 12.0 fL    Platelets 353 140 - 450 10*3/mm3    Neutrophil % 49.5 42.7 - 76.0 %    Lymphocyte % 39.8 19.6 - 45.3 %    Monocyte % 6.8 5.0 - 12.0 %    Eosinophil % 3.5 0.3 - 6.2 %    Basophil % 0.3 0.0 - 1.5 %    Immature Grans % 0.1 0.0 - 0.5 %    Neutrophils, Absolute 3.78 1.70 - 7.00 10*3/mm3    Lymphocytes, Absolute 3.04 0.70 - 3.10 10*3/mm3    Monocytes, Absolute 0.52 0.10 - 0.90 10*3/mm3    Eosinophils, Absolute 0.27  0.00 - 0.40 10*3/mm3    Basophils, Absolute 0.02 0.00 - 0.20 10*3/mm3    Immature Grans, Absolute 0.01 0.00 - 0.05 10*3/mm3   Urinalysis With Microscopic If Indicated (No Culture) - Urine, Clean Catch    Specimen: Urine, Clean Catch   Result Value Ref Range    Color, UA Yellow Yellow, Straw    Appearance, UA Clear Clear    pH, UA 6.0 5.0 - 8.0    Specific Gravity, UA 1.025 1.005 - 1.030    Glucose, UA Negative Negative    Ketones, UA Negative Negative    Bilirubin, UA Negative Negative    Blood, UA Negative Negative    Protein, UA Negative Negative    Leuk Esterase, UA Trace (A) Negative    Nitrite, UA Negative Negative    Urobilinogen, UA 0.2 E.U./dL 0.2 - 1.0 E.U./dL   Microalbumin / Creatinine Urine Ratio - Urine, Clean Catch    Specimen: Urine, Clean Catch   Result Value Ref Range    Microalbumin/Creatinine Ratio      Creatinine, Urine 122.7 mg/dL    Microalbumin, Urine <1.2 mg/dL   Urinalysis, Microscopic Only - Urine, Clean Catch    Specimen: Urine, Clean Catch   Result Value Ref Range    RBC, UA None Seen None Seen /HPF    WBC, UA 0-2 (A) None Seen /HPF    Bacteria, UA 1+ (A) None Seen /HPF    Squamous Epithelial Cells, UA 7-12 (A) None Seen, 0-2 /HPF    Calcium Oxalate Crystals, UA Small/1+ None Seen /HPF    Methodology Manual Light Microscopy                Assessment and Plan:          Diagnoses and all orders for this visit:    1. Dysuria (Primary)  Assessment & Plan:  UTI treatment: pending u/a  If needed will send rx to [facility] pharmacy     Orders:  -     Urinalysis With Culture If Indicated -    2. Mixed hyperlipidemia  Assessment & Plan:  Continue current medication and efforts with diet and exercise.       Orders:  -     Comprehensive Metabolic Panel  -     Lipid Panel  -     rosuvastatin (CRESTOR) 5 MG tablet; Take 1 tablet by mouth Daily.  Dispense: 90 tablet; Refill: 1    3. Other mixed anxiety disorders  Assessment & Plan:  Continue wellbutrin and cymbalta     Orders:  -     buPROPion XL  (WELLBUTRIN XL) 300 MG 24 hr tablet; Take 1 tablet by mouth Daily.  Dispense: 90 tablet; Refill: 1  -     DULoxetine (CYMBALTA) 60 MG capsule; Take 1 capsule by mouth 2 (Two) Times a Day.  Dispense: 180 capsule; Refill: 1    4. Hypothyroidism (acquired)  Assessment & Plan:  Rechecking TSH, sent in rx, will adjust dose if needed, reviewed last few labs     Orders:  -     TSH Rfx On Abnormal To Free T4  -     Synthroid 112 MCG tablet; Take 1 tablet by mouth Daily.  Dispense: 90 tablet; Refill: 1    5. Essential hypertension  Assessment & Plan:  Hypertension is stable.  to monitor BP at home. Continue current meds. Continue to modify diet and lifestyle. Sent in the 25 mg, but she was dropping a little low, so is only taking 12.5 mg of HCTZ     Orders:  -     atenolol (TENORMIN) 25 MG tablet; Take 1 tablet by mouth Daily.  Dispense: 90 tablet; Refill: 1  -     hydroCHLOROthiazide (HYDRODIURIL) 25 MG tablet; Take 1 tablet by mouth Daily.  Dispense: 90 tablet; Refill: 1  -     lisinopril (PRINIVIL,ZESTRIL) 10 MG tablet; Take 1 tablet by mouth Daily.  Dispense: 90 tablet; Refill: 1    6. Type 2 diabetes mellitus without complication, without long-term current use of insulin (HCC)  Assessment & Plan:  Rechecking some labs today, reviewed her last A1C;; advised to do feet exams     Orders:  -     metFORMIN (GLUCOPHAGE) 500 MG tablet; Take 2 tablets by mouth 2 (Two) Times a Day.  Dispense: 360 tablet; Refill: 1        Follow Up   Return for followup pending lab results.  Patient was given instructions and counseling regarding her condition or for health maintenance advice. Please see specific information pulled into the AVS if appropriate.

## 2023-01-12 LAB — BACTERIA SPEC AEROBE CULT: ABNORMAL

## 2023-01-12 RX ORDER — NITROFURANTOIN 25; 75 MG/1; MG/1
100 CAPSULE ORAL 2 TIMES DAILY
Qty: 14 CAPSULE | Refills: 0 | Status: SHIPPED | OUTPATIENT
Start: 2023-01-12 | End: 2023-02-20

## 2023-02-20 ENCOUNTER — HOSPITAL ENCOUNTER (OUTPATIENT)
Dept: GENERAL RADIOLOGY | Facility: HOSPITAL | Age: 68
Discharge: HOME | End: 2023-02-20
Payer: COMMERCIAL

## 2023-02-20 ENCOUNTER — OFFICE VISIT (OUTPATIENT)
Dept: FAMILY MEDICINE CLINIC | Age: 68
End: 2023-02-20
Payer: COMMERCIAL

## 2023-02-20 ENCOUNTER — LAB (OUTPATIENT)
Dept: LAB | Facility: HOSPITAL | Age: 68
End: 2023-02-20
Payer: COMMERCIAL

## 2023-02-20 VITALS
BODY MASS INDEX: 30.93 KG/M2 | RESPIRATION RATE: 18 BRPM | HEART RATE: 77 BPM | HEIGHT: 64 IN | SYSTOLIC BLOOD PRESSURE: 109 MMHG | WEIGHT: 181.2 LBS | DIASTOLIC BLOOD PRESSURE: 59 MMHG

## 2023-02-20 DIAGNOSIS — R55 SYNCOPE AND COLLAPSE: ICD-10-CM

## 2023-02-20 DIAGNOSIS — E11.9 TYPE 2 DIABETES MELLITUS WITHOUT COMPLICATION, WITHOUT LONG-TERM CURRENT USE OF INSULIN: ICD-10-CM

## 2023-02-20 DIAGNOSIS — I10 ESSENTIAL HYPERTENSION: ICD-10-CM

## 2023-02-20 DIAGNOSIS — M53.3 PAIN IN THE COCCYX: Primary | ICD-10-CM

## 2023-02-20 LAB
ANION GAP SERPL CALCULATED.3IONS-SCNC: 10 MMOL/L (ref 5–15)
BASOPHILS # BLD AUTO: 0.06 10*3/MM3 (ref 0–0.2)
BASOPHILS NFR BLD AUTO: 0.7 % (ref 0–1.5)
BUN SERPL-MCNC: 21 MG/DL (ref 8–23)
BUN/CREAT SERPL: 21.2 (ref 7–25)
CALCIUM SPEC-SCNC: 9.8 MG/DL (ref 8.6–10.5)
CHLORIDE SERPL-SCNC: 103 MMOL/L (ref 98–107)
CO2 SERPL-SCNC: 26 MMOL/L (ref 22–29)
CREAT SERPL-MCNC: 0.99 MG/DL (ref 0.57–1)
DEPRECATED RDW RBC AUTO: 51.4 FL (ref 37–54)
EGFRCR SERPLBLD CKD-EPI 2021: 62.6 ML/MIN/1.73
EOSINOPHIL # BLD AUTO: 0.56 10*3/MM3 (ref 0–0.4)
EOSINOPHIL NFR BLD AUTO: 6.8 % (ref 0.3–6.2)
ERYTHROCYTE [DISTWIDTH] IN BLOOD BY AUTOMATED COUNT: 15.4 % (ref 12.3–15.4)
GLUCOSE SERPL-MCNC: 139 MG/DL (ref 65–99)
HCT VFR BLD AUTO: 37.8 % (ref 34–46.6)
HGB BLD-MCNC: 12.1 G/DL (ref 12–15.9)
IMM GRANULOCYTES # BLD AUTO: 0.01 10*3/MM3 (ref 0–0.05)
IMM GRANULOCYTES NFR BLD AUTO: 0.1 % (ref 0–0.5)
LYMPHOCYTES # BLD AUTO: 4.01 10*3/MM3 (ref 0.7–3.1)
LYMPHOCYTES NFR BLD AUTO: 48.9 % (ref 19.6–45.3)
MCH RBC QN AUTO: 29 PG (ref 26.6–33)
MCHC RBC AUTO-ENTMCNC: 32 G/DL (ref 31.5–35.7)
MCV RBC AUTO: 90.6 FL (ref 79–97)
MONOCYTES # BLD AUTO: 0.72 10*3/MM3 (ref 0.1–0.9)
MONOCYTES NFR BLD AUTO: 8.8 % (ref 5–12)
NEUTROPHILS NFR BLD AUTO: 2.84 10*3/MM3 (ref 1.7–7)
NEUTROPHILS NFR BLD AUTO: 34.7 % (ref 42.7–76)
PLATELET # BLD AUTO: 345 10*3/MM3 (ref 140–450)
PMV BLD AUTO: 9.3 FL (ref 6–12)
POTASSIUM SERPL-SCNC: 4.4 MMOL/L (ref 3.5–5.2)
RBC # BLD AUTO: 4.17 10*6/MM3 (ref 3.77–5.28)
SODIUM SERPL-SCNC: 139 MMOL/L (ref 136–145)
WBC NRBC COR # BLD: 8.2 10*3/MM3 (ref 3.4–10.8)

## 2023-02-20 PROCEDURE — 36415 COLL VENOUS BLD VENIPUNCTURE: CPT

## 2023-02-20 PROCEDURE — 99214 OFFICE O/P EST MOD 30 MIN: CPT | Performed by: NURSE PRACTITIONER

## 2023-02-20 PROCEDURE — 72220 X-RAY EXAM SACRUM TAILBONE: CPT

## 2023-02-20 PROCEDURE — 85025 COMPLETE CBC W/AUTO DIFF WBC: CPT | Performed by: NURSE PRACTITIONER

## 2023-02-20 PROCEDURE — 80048 BASIC METABOLIC PNL TOTAL CA: CPT

## 2023-02-20 RX ORDER — BLOOD-GLUCOSE SENSOR
EACH MISCELLANEOUS
COMMUNITY
Start: 2023-01-09

## 2023-02-20 NOTE — ASSESSMENT & PLAN NOTE
Send for x-ray to [facility], not in the office, use heat, continue OTC pain relievers, dicussed possible tramadol use, topical lidocaine

## 2023-02-20 NOTE — ASSESSMENT & PLAN NOTE
She is seeing manuel for her DM, we reviewed her blood glucose readings, she is not ozempic, due to can't get that rx

## 2023-02-20 NOTE — PROGRESS NOTES
"Guillermina Gardner presents to [facility].    Chief Complaint:  Loss of Consciousness (Reports fainted and fell, concerned for a broken tail bone. Reports DOI was on 2023. Denies going to the ER or any fainting spells since. Reports pain is constant and does not improve. Reports pain is only in tail bone and surrounding area. Reports loss of consciousness until hit the floor then was \"awoke from the pain immediately\" )         History of Present Illness:  Joint pain:   Tailbone pain   Symptoms started: 23  associated symptoms: pain to sit of lie on her back  Treatment tried: tylenol, took one pain rx at house, did not help, tried topical, laying on her side helped   Dg testing : none   Was taking her muscle relaxer due to back pain, got up to fix dinner and walked then had a sudden syncope episode, hit her tailbone on linoleum floor   No chest pain or heart palpitations   Had been feeling light headed, bp runs around 102/54  Stopped HCTZ felt better since stopping   Average blood sugar over last week 142, 14 days avg 138      PAST MEDICAL HISTORY changes since :         covid + 2-    Dizziness - Seen Dr. Almaguer         GYNECOLOGICAL HISTORY:             PREVENTIVE HEALTH MAINTENANCE             Hepatitis C Medicare Screening: was last done 3-17-18; negative     MAMMOGRAM: Done within last 2 years and results in are chart was last done 01/15/21 stable         Surgical History:         : X 2;     Lap Band ; Procedures: Ablation     Positive for    thumb joint left replaced/repaired 8-6-15; and    right thumb replaced 18;;     COLONOSCOPY: was last done 2016 with normal results Dr Rosa Forte         Family History:     Father:  at age 89;  Type 2 Diabetes; Hypothyroidism;  Macular degeneration     Mother:  at age 90;  Hypertension;  Dementia     Sister(s): Hypertension; Endocrine Hypothyroidism         Social History: "     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   Respiratory: Negative for cough and shortness of breath.    Cardiovascular: Negative for chest pain, palpitations and leg swelling.   Neurological: Negative for numbness.          Current Outpatient Medications:   •  Acetaminophen (TYLENOL ARTHRITIS EXT RELIEF PO), Take  by mouth., Disp: , Rfl:   •  atenolol (TENORMIN) 25 MG tablet, Take 1 tablet by mouth Daily., Disp: 90 tablet, Rfl: 1  •  buPROPion XL (WELLBUTRIN XL) 300 MG 24 hr tablet, Take 1 tablet by mouth Daily., Disp: 90 tablet, Rfl: 1  •  cholecalciferol (VITAMIN D3) 25 MCG (1000 UT) tablet, Take 1,000 Units by mouth Daily., Disp: , Rfl:   •  coenzyme Q10 100 MG capsule, Take 1 tablet by mouth Daily., Disp: , Rfl:   •  DULoxetine (CYMBALTA) 60 MG capsule, Take 1 capsule by mouth 2 (Two) Times a Day., Disp: 180 capsule, Rfl: 1  •  lisinopril (PRINIVIL,ZESTRIL) 10 MG tablet, Take 1 tablet by mouth Daily., Disp: 90 tablet, Rfl: 1  •  metFORMIN (GLUCOPHAGE) 500 MG tablet, Take 2 tablets by mouth 2 (Two) Times a Day., Disp: 360 tablet, Rfl: 1  •  Multiple Vitamins-Minerals (EYE VITAMINS PO), Take 1 tablet by mouth Daily., Disp: , Rfl:   •  Premarin 0.625 MG/GM vaginal cream, INSERT 0.5 GRAMS VAGINALLY DAILY FOR 3 WEEKS , THEN OFF 1 WEEK AND REPEAT, Disp: 60 g, Rfl: 0  •  promethazine (PHENERGAN) 25 MG tablet, Take 1/2 to 1 tablet by mouth Every 6 (Six) Hours As Needed for Nausea, Disp: 30 tablet, Rfl: 1  •  rosuvastatin (CRESTOR) 5 MG tablet, Take 1 tablet by mouth Daily., Disp: 90 tablet, Rfl: 1  •  SUMAtriptan (IMITREX) 50 MG tablet, TAKE ONE TABLET BY MOUTH AT ONSET OF HEADACHE; MAY REPEAT ONE TABLET IN 2 HOURS IF NEEDED. MAX DOSE 200 MG IN 24 HOURS, Disp: 9 tablet, Rfl: 2  •  Synthroid 112 MCG tablet, Take 1 tablet by mouth Daily.,  "Disp: 90 tablet, Rfl: 1  •  traZODone (DESYREL) 50 MG tablet, TAKE 1 TABLET BY MOUTH ONCE NIGHTLY AS NEEDED FOR SLEEP, Disp: 30 tablet, Rfl: 0  •  Xiidra 5 % ophthalmic solution, Apply 1 drop to eye(s) as directed by provider 2 (Two) Times a Day., Disp: , Rfl:   •  Continuous Blood Gluc Sensor (FreeStyle Fredrick 3 Sensor) misc, USE 1 EVERY 14 DAYS AS DIRECTED, Disp: , Rfl:   •  TiZANidine (ZANAFLEX) 4 MG capsule, Take 1 capsule by mouth 3 (Three) Times a Day. (Patient taking differently: Take 4 mg by mouth 3 (Three) Times a Day As Needed.), Disp: 60 capsule, Rfl: 1    Vital Signs:   Vitals:    02/20/23 1428   BP: 109/59   BP Location: Left arm   Patient Position: Sitting   Cuff Size: Adult   Pulse: 77   Resp: 18   Weight: 82.2 kg (181 lb 3.2 oz)   Height: 163.2 cm (64.25\")   PainSc:   8   PainLoc: Buttocks         Physical Exam:  Physical Exam  Vitals reviewed.   Constitutional:       General: She is not in acute distress.     Appearance: Normal appearance.   Neck:      Vascular: No carotid bruit.   Cardiovascular:      Rate and Rhythm: Normal rate and regular rhythm.      Heart sounds: Normal heart sounds. No murmur heard.  Pulmonary:      Effort: Pulmonary effort is normal. No respiratory distress.      Breath sounds: Normal breath sounds.   Musculoskeletal:         General: Tenderness (to lower lumbar coccyx area ) present.      Right lower leg: No edema.      Left lower leg: No edema.   Skin:     General: Skin is warm and dry.      Findings: No bruising.   Neurological:      Mental Status: She is alert.   Psychiatric:         Mood and Affect: Mood normal.         Behavior: Behavior normal.         Result Review      The following data was reviewed by: SHANNA Felix on 02/20/2023:    Results for orders placed or performed in visit on 01/10/23   Urine Culture - Urine, Urine, Clean Catch    Specimen: Urine, Clean Catch   Result Value Ref Range    Urine Culture >100,000 CFU/mL Enterococcus faecalis (A)  "       Susceptibility    Enterococcus faecalis - PRICILLA     Ampicillin  Susceptible ug/ml     Levofloxacin  Susceptible ug/ml     Nitrofurantoin  Susceptible ug/ml     Tetracycline  Susceptible ug/ml     Vancomycin  Susceptible ug/ml   Comprehensive Metabolic Panel    Specimen: Blood   Result Value Ref Range    Glucose 148 (H) 65 - 99 mg/dL    BUN 24 (H) 8 - 23 mg/dL    Creatinine 1.26 (H) 0.57 - 1.00 mg/dL    Sodium 140 136 - 145 mmol/L    Potassium 4.6 3.5 - 5.2 mmol/L    Chloride 100 98 - 107 mmol/L    CO2 28.8 22.0 - 29.0 mmol/L    Calcium 9.8 8.6 - 10.5 mg/dL    Total Protein 6.8 6.0 - 8.5 g/dL    Albumin 4.1 3.5 - 5.2 g/dL    ALT (SGPT) 13 1 - 33 U/L    AST (SGOT) 17 1 - 32 U/L    Alkaline Phosphatase 84 39 - 117 U/L    Total Bilirubin 0.3 0.0 - 1.2 mg/dL    Globulin 2.7 gm/dL    A/G Ratio 1.5 g/dL    BUN/Creatinine Ratio 19.0 7.0 - 25.0    Anion Gap 11.2 5.0 - 15.0 mmol/L    eGFR 47.2 (L) >60.0 mL/min/1.73   Lipid Panel    Specimen: Blood   Result Value Ref Range    Total Cholesterol 201 (H) 0 - 200 mg/dL    Triglycerides 110 0 - 150 mg/dL    HDL Cholesterol 61 (H) 40 - 60 mg/dL    LDL Cholesterol  120 (H) 0 - 100 mg/dL    VLDL Cholesterol 20 5 - 40 mg/dL    LDL/HDL Ratio 1.93    TSH Rfx On Abnormal To Free T4    Specimen: Blood   Result Value Ref Range    TSH 3.130 0.270 - 4.200 uIU/mL   Urinalysis With Culture If Indicated - Urine, Clean Catch    Specimen: Urine, Clean Catch   Result Value Ref Range    Color, UA Yellow Yellow, Straw    Appearance, UA Cloudy (A) Clear    pH, UA 6.0 5.0 - 8.0    Specific Gravity, UA 1.020 1.005 - 1.030    Glucose, UA Negative Negative    Ketones, UA Negative Negative    Bilirubin, UA Negative Negative    Blood, UA Trace (A) Negative    Protein, UA Negative Negative    Leuk Esterase, UA Large (3+) (A) Negative    Nitrite, UA Negative Negative    Urobilinogen, UA 0.2 E.U./dL 0.2 - 1.0 E.U./dL   Urinalysis, Microscopic Only - Urine, Clean Catch    Specimen: Urine, Clean Catch    Result Value Ref Range    RBC, UA 0-2 (A) None Seen /HPF    WBC, UA 21-30 (A) None Seen /HPF    Bacteria, UA 3+ (A) None Seen /HPF    Squamous Epithelial Cells, UA 13-20 (A) None Seen, 0-2 /HPF    Methodology Manual Light Microscopy                Assessment and Plan:          Diagnoses and all orders for this visit:    1. Pain in the coccyx (Primary)  Assessment & Plan:  Send for x-ray to [facility], not in the office, use heat, continue OTC pain relievers, dicussed possible tramadol use, topical lidocaine     Orders:  -     XR Sacrum & Coccyx (In Office)    2. Essential hypertension  Assessment & Plan:  Rechecking a BMP, reviewed lab from 1-2023, stay well hydrated     Orders:  -     Basic metabolic panel; Future    3. Syncope and collapse  Assessment & Plan:  Checking a CBC    Orders:  -     Cancel: CBC w AUTO Differential; Future  -     CBC w AUTO Differential    4. Type 2 diabetes mellitus without complication, without long-term current use of insulin (HCC)  Assessment & Plan:  She is seeing endo for her DM, we reviewed her blood glucose readings, she is not ozempic, due to can't get that rx           Follow Up   Return for followup pending lab results, fasting for labs.  Patient was given instructions and counseling regarding her condition or for health maintenance advice. Please see specific information pulled into the AVS if appropriate.

## 2023-02-22 DIAGNOSIS — M53.3 PAIN IN THE COCCYX: Primary | ICD-10-CM

## 2023-02-22 RX ORDER — TRAMADOL HYDROCHLORIDE 50 MG/1
50 TABLET ORAL EVERY 6 HOURS PRN
Qty: 20 TABLET | Refills: 0 | Status: SHIPPED | OUTPATIENT
Start: 2023-02-22

## 2023-02-26 DIAGNOSIS — N95.9 MENOPAUSAL AND PERIMENOPAUSAL DISORDER: ICD-10-CM

## 2023-02-27 NOTE — TELEPHONE ENCOUNTER
Rx Refill Note  Requested Prescriptions     Pending Prescriptions Disp Refills   • Premarin 0.625 MG/GM vaginal cream [Pharmacy Med Name: PREMARIN VAGINAL CREAM-APPL] 30 g      Sig: INSERT 0.5 GRAMS VAGINALLY DAILY FOR 3 WEEKS, THEN OFF 1 WEEK AND REPEAT      Last office visit with prescribing clinician: 2/20/2023     Next office visit with prescribing clinician: Visit date not found   {  Marnie Mendoza LPN  02/27/23, 10:41 EST     Last filled 12/5/22 30 g 84 day supply     Last mammo 7/29/22    No pap results in chart

## 2023-02-28 RX ORDER — CONJUGATED ESTROGENS 0.62 MG/G
CREAM VAGINAL
Qty: 30 G | Refills: 2 | Status: SHIPPED | OUTPATIENT
Start: 2023-02-28

## 2023-03-20 RX ORDER — TRIAMCINOLONE ACETONIDE 1 MG/G
CREAM TOPICAL
Qty: 30 G | OUTPATIENT
Start: 2023-03-20

## 2023-03-20 NOTE — TELEPHONE ENCOUNTER
Rx Refill Note  Requested Prescriptions     Pending Prescriptions Disp Refills   • triamcinolone (KENALOG) 0.1 % cream [Pharmacy Med Name: TRIAMCINOLONE 0.1% CREAM] 30 g      Sig: APPLY A THIN LAYER TO AFFECTED AREA TWO TIMES A DAY      Last office visit with prescribing clinician: 2/20/2023     Next office visit with prescribing clinician: Visit date not found     {Marnie Mendoza LPN  03/20/23, 10:18 EDT     Not on med list   Denied

## 2023-04-18 DIAGNOSIS — G47.09 OTHER INSOMNIA: ICD-10-CM

## 2023-04-18 RX ORDER — TRAZODONE HYDROCHLORIDE 50 MG/1
TABLET ORAL
Qty: 30 TABLET | Refills: 2 | Status: SHIPPED | OUTPATIENT
Start: 2023-04-18

## 2023-04-18 NOTE — TELEPHONE ENCOUNTER
Rx Refill Note  Requested Prescriptions     Pending Prescriptions Disp Refills   • traZODone (DESYREL) 50 MG tablet [Pharmacy Med Name: traZODone 50 MG TABLET] 30 tablet 0     Sig: TAKE ONE TABLET BY MOUTH MOUTH NIGHTLY AS NEEDED FOR SLEEP      Last office visit with prescribing clinician: 2/20/2023     Next office visit with prescribing clinician: Visit date not found     {Marnie Mendoza LPN  04/18/23, 08:47 EDT

## 2023-05-08 RX ORDER — SUMATRIPTAN 50 MG/1
TABLET, FILM COATED ORAL
Qty: 9 TABLET | Refills: 2 | Status: SHIPPED | OUTPATIENT
Start: 2023-05-08

## 2023-05-08 NOTE — TELEPHONE ENCOUNTER
Rx Refill Note  Requested Prescriptions     Pending Prescriptions Disp Refills   • SUMAtriptan (IMITREX) 50 MG tablet [Pharmacy Med Name: SUMAtriptan SUCC 50 MG TABLET] 9 tablet 2     Sig: TAKE ONE TABLET BY MOUTH AT ONSET OF HEADACHE; MAY REPEAT ONE TABLET IN 2 HOURS IF NEEDED. **MAX: 4 TABLETS IN 24 HOURS**      Last office visit with prescribing clinician: 2/20/2023       Next office visit with prescribing clinician: Visit date not found    Last filled 5/10/22  #9 2 refills     Marnie Mendoza LPN  05/08/23, 10:14 EDT

## 2023-05-23 RX ORDER — BLOOD-GLUCOSE SENSOR
1 EACH MISCELLANEOUS SEE ADMIN INSTRUCTIONS
Qty: 1 EACH | Refills: 2 | Status: SHIPPED | OUTPATIENT
Start: 2023-05-23

## 2023-05-23 NOTE — TELEPHONE ENCOUNTER
Rx Refill Note  Requested Prescriptions     Pending Prescriptions Disp Refills   • Continuous Blood Gluc Sensor (FreeStyle Fredrick 3 Sensor) misc 1 each 2     Si Units by Other route See Admin Instructions.      Last office visit with prescribing clinician: 2023       Next office visit with prescribing clinician: Visit date not found    Marnie Mendoza LPN  23, 11:46 EDT

## 2023-08-01 DIAGNOSIS — E03.9 HYPOTHYROIDISM (ACQUIRED): ICD-10-CM

## 2023-08-01 DIAGNOSIS — E78.2 MIXED HYPERLIPIDEMIA: Primary | ICD-10-CM

## 2023-08-03 ENCOUNTER — LAB (OUTPATIENT)
Dept: LAB | Facility: HOSPITAL | Age: 68
End: 2023-08-03
Payer: MEDICARE

## 2023-08-03 DIAGNOSIS — E03.9 HYPOTHYROIDISM (ACQUIRED): ICD-10-CM

## 2023-08-03 DIAGNOSIS — E78.2 MIXED HYPERLIPIDEMIA: ICD-10-CM

## 2023-08-03 LAB
CHOLEST SERPL-MCNC: 143 MG/DL (ref 0–200)
HDLC SERPL-MCNC: 64 MG/DL (ref 40–60)
LDLC SERPL CALC-MCNC: 66 MG/DL (ref 0–100)
LDLC/HDLC SERPL: 1.03 {RATIO}
TRIGL SERPL-MCNC: 65 MG/DL (ref 0–150)
TSH SERPL DL<=0.05 MIU/L-ACNC: 1.61 UIU/ML (ref 0.27–4.2)
VLDLC SERPL-MCNC: 13 MG/DL (ref 5–40)

## 2023-08-03 PROCEDURE — 80061 LIPID PANEL: CPT

## 2023-08-03 PROCEDURE — 84443 ASSAY THYROID STIM HORMONE: CPT

## 2023-08-03 PROCEDURE — 36415 COLL VENOUS BLD VENIPUNCTURE: CPT

## 2023-08-07 ENCOUNTER — HOSPITAL ENCOUNTER (OUTPATIENT)
Dept: GENERAL RADIOLOGY | Facility: HOSPITAL | Age: 68
Discharge: HOME | End: 2023-08-07
Admitting: NURSE PRACTITIONER
Payer: COMMERCIAL

## 2023-08-07 ENCOUNTER — OFFICE VISIT (OUTPATIENT)
Dept: FAMILY MEDICINE CLINIC | Age: 68
End: 2023-08-07
Payer: COMMERCIAL

## 2023-08-07 VITALS
SYSTOLIC BLOOD PRESSURE: 107 MMHG | DIASTOLIC BLOOD PRESSURE: 76 MMHG | HEART RATE: 73 BPM | OXYGEN SATURATION: 95 % | BODY MASS INDEX: 30.39 KG/M2 | WEIGHT: 178 LBS | HEIGHT: 64 IN

## 2023-08-07 DIAGNOSIS — E78.2 MIXED HYPERLIPIDEMIA: ICD-10-CM

## 2023-08-07 DIAGNOSIS — M54.50 LOW BACK PAIN RADIATING TO LEFT LOWER EXTREMITY: ICD-10-CM

## 2023-08-07 DIAGNOSIS — M79.605 LOW BACK PAIN RADIATING TO LEFT LOWER EXTREMITY: ICD-10-CM

## 2023-08-07 DIAGNOSIS — E03.9 HYPOTHYROIDISM (ACQUIRED): ICD-10-CM

## 2023-08-07 DIAGNOSIS — G47.09 OTHER INSOMNIA: ICD-10-CM

## 2023-08-07 DIAGNOSIS — F41.3 OTHER MIXED ANXIETY DISORDERS: Primary | ICD-10-CM

## 2023-08-07 DIAGNOSIS — I10 ESSENTIAL HYPERTENSION: ICD-10-CM

## 2023-08-07 DIAGNOSIS — E11.9 TYPE 2 DIABETES MELLITUS WITHOUT COMPLICATION, WITHOUT LONG-TERM CURRENT USE OF INSULIN: ICD-10-CM

## 2023-08-07 DIAGNOSIS — R11.0 NAUSEA: ICD-10-CM

## 2023-08-07 DIAGNOSIS — R21 RASH: ICD-10-CM

## 2023-08-07 PROCEDURE — 72100 X-RAY EXAM L-S SPINE 2/3 VWS: CPT

## 2023-08-07 PROCEDURE — 99214 OFFICE O/P EST MOD 30 MIN: CPT | Performed by: NURSE PRACTITIONER

## 2023-08-07 RX ORDER — SEMAGLUTIDE 1.34 MG/ML
1 INJECTION, SOLUTION SUBCUTANEOUS WEEKLY
COMMUNITY
Start: 2023-07-29

## 2023-08-07 RX ORDER — BUPROPION HYDROCHLORIDE 300 MG/1
300 TABLET ORAL DAILY
Qty: 90 TABLET | Refills: 1 | Status: SHIPPED | OUTPATIENT
Start: 2023-08-07

## 2023-08-07 RX ORDER — DULOXETIN HYDROCHLORIDE 60 MG/1
60 CAPSULE, DELAYED RELEASE ORAL 2 TIMES DAILY
Qty: 180 CAPSULE | Refills: 1 | Status: SHIPPED | OUTPATIENT
Start: 2023-08-07

## 2023-08-07 RX ORDER — TRAZODONE HYDROCHLORIDE 50 MG/1
50 TABLET ORAL NIGHTLY
Qty: 90 TABLET | Refills: 1 | Status: SHIPPED | OUTPATIENT
Start: 2023-08-07

## 2023-08-07 RX ORDER — HYDROCHLOROTHIAZIDE 25 MG/1
25 TABLET ORAL DAILY
COMMUNITY
Start: 2023-05-19

## 2023-08-07 RX ORDER — PROMETHAZINE HYDROCHLORIDE 25 MG/1
25 TABLET ORAL EVERY 6 HOURS PRN
Qty: 30 TABLET | Refills: 1 | Status: SHIPPED | OUTPATIENT
Start: 2023-08-07

## 2023-08-07 RX ORDER — TRIAMCINOLONE ACETONIDE 1 MG/G
1 CREAM TOPICAL 2 TIMES DAILY
Qty: 45 G | Refills: 0 | Status: SHIPPED | OUTPATIENT
Start: 2023-08-07

## 2023-08-07 RX ORDER — HYDROXYZINE HYDROCHLORIDE 25 MG/1
25 TABLET, FILM COATED ORAL 3 TIMES DAILY PRN
Qty: 60 TABLET | Refills: 1 | Status: SHIPPED | OUTPATIENT
Start: 2023-08-07

## 2023-08-07 RX ORDER — TRAZODONE HYDROCHLORIDE 50 MG/1
50 TABLET ORAL NIGHTLY PRN
Qty: 30 TABLET | Refills: 2 | Status: CANCELLED | OUTPATIENT
Start: 2023-08-07

## 2023-08-07 RX ORDER — LEVOTHYROXINE SODIUM 112 MCG
112 TABLET ORAL DAILY
Qty: 90 TABLET | Refills: 1 | Status: SHIPPED | OUTPATIENT
Start: 2023-08-07

## 2023-08-07 NOTE — ASSESSMENT & PLAN NOTE
Discussed rx's, to eat healthy and get regular exercise, consider seeing a therapist, will send in a rx for hydroxyzine to use prn, follow up if not improving

## 2023-08-07 NOTE — PROGRESS NOTES
Chief Complaint  Hypertension, Diabetes, Hypothyroidism, and Anxiety and HLD    Subjective          Guillermina MYRICK Jj presents to [facility]    History of Present Illness  Diabetes:  Current medication: metformin, ozempic /[facility] manuel [facility]   Tolerating medication: Yes  Last eye exam: JANNA/Armond  Last foot exam: 1-2023  At home BS ranges: average 163 (for the week 145)   Lab Results       Component                Value               Date                       HGBA1C                   6.70 (H)            06/26/2023              Hypertension:  Current medication: atenolol, HCTZ , lisinopril   Tolerating Medication: Yes  Checking BP at home and it is: 115-120 over 70-80  Needs refills: No  Labs:  Lab Results       Component                Value               Date                       GLUCOSE                  152 (H)             06/26/2023                 BUN                      12                  06/26/2023                 CREATININE               0.94                06/26/2023                 EGFRIFNONA               62                  01/03/2022                 BCR                      12.8                06/26/2023                 K                        4.7                 06/26/2023                 CO2                      27.0                06/26/2023                 CALCIUM                  9.9                 06/26/2023                 ALBUMIN                  4.3                 06/26/2023                 LABIL2                   1.4                 04/21/2021                 AST                      24                  06/26/2023                 ALT                      23                  06/26/2023                  Hypothyroidism  Current rx  brand synthroid 112 mcg   Tolerating rx: yes   Refills needed Yes to [facility]   Lab Results       Component                Value               Date                       TSH                      1.610               08/03/2023               Hyperlipidemia  Current medication: crestor   Tolerating medication: Yes  Needs Refill: No     Lab Results       Component                Value               Date                       CHOL                     143                 2023                 CHLPL                    148                 2021                 TRIG                     65                  2023                 HDL                      64 (H)              2023                 LDL                      66                  2023              Anxiety/ Depression/insomnia   Current medication/cymbalta 120, wellbutrin 300  Tolerating medication Yes  Stressors  traveling to [place] at night   Current symptoms anxious, needs rx to [facility]     PAST MEDICAL HISTORY changes since :       Seen neph and released last month     covid + 22    Dizziness - Seen Dr. Almaguer         GYNECOLOGICAL HISTORY:             PREVENTIVE HEALTH MAINTENANCE             Hepatitis C Medicare Screening: was last done 3-17-18; negative     MAMMOGRAM: Done within last 2 years and results in are chart was last done 01/15/21 stable         Surgical History:         : X 2;     Lap Band ; Procedures: Ablation     Positive for    thumb joint left replaced/repaired 8-6-15; and    right thumb replaced 18;;     COLONOSCOPY: was last done 2016 with normal results Dr Rosa Forte         Family History:     Father:  at age 89;  Type 2 Diabetes; Hypothyroidism;  Macular degeneration     Mother:  at age 90;  Hypertension;  Dementia     Sister(s): Hypertension; Endocrine Hypothyroidism         Social History:     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[employer])     Marital Status:      Children: 2 children                        Past Medical History:   Diagnosis Date    Acute maxillary sinusitis, unspecified     Acute upper respiratory infection, unspecified      Anxiety disorder, unspecified     Anxiety with depression     Candidiasis of vulva and vagina     Dizziness     Dizziness and giddiness     Dysuria     Essential hypertension     Essential hypertension     Fatigue     Fever, unspecified     Foot pain     Headache     Hip pain     Hypercholesterolemia     Hypothyroidism (acquired)     Joint pain     Long term (current) use of opiate analgesic     Low back pain     Mixed hyperlipidemia     Other long term (current) drug therapy     Other specified hypothyroidism     Pain in right hip     Thumb pain     Type 2 diabetes mellitus without complication     Unspecified menopausal and perimenopausal disorder        Allergies   Allergen Reactions    Tessalon [Benzonatate] Palpitations    Methyldopa Itching        Past Surgical History:   Procedure Laterality Date     SECTION      X 2    COLONOSCOPY  2016    normal results    JOINT REPLACEMENT Bilateral     left thumb replaced 8/6/15  , right thumb replaced 2018    LAPAROSCOPIC GASTRIC BANDING      OTHER SURGICAL HISTORY      ablation        Social History     Tobacco Use    Smoking status: Former     Packs/day: 0.50     Years: 41.00     Pack years: 20.50     Types: Cigarettes     Quit date:      Years since quittin.6    Smokeless tobacco: Never   Substance Use Topics    Alcohol use: Defer       Family History   Problem Relation Age of Onset    Dementia Mother          at age 90    Hypertension Mother     Diabetes type II Father          at age 89    Hypothyroidism Father     Macular degeneration Father     Hypertension Sister     Hypothyroidism Sister         endocrine        Health Maintenance Due   Topic Date Due    ANNUAL PHYSICAL  Never done    COVID-19 Vaccine (7 - Mixed Product series) 2023        Current Outpatient Medications on File Prior to Visit   Medication Sig    atenolol (TENORMIN) 25 MG tablet TAKE ONE TABLET BY MOUTH DAILY    cholecalciferol (VITAMIN D3) 25 MCG  (1000 UT) tablet Take 1 tablet by mouth Daily.    coenzyme Q10 100 MG capsule Take 1 tablet by mouth Daily.    Continuous Blood Gluc Sensor (FreeStyle Fredrick 3 Sensor) misc USE ONE SENSOR EVERY 14 DAYS    hydroCHLOROthiazide (HYDRODIURIL) 25 MG tablet Take 1 tablet by mouth Daily.    IRON, FERROUS GLUCONATE, PO Take  by mouth.    lisinopril (PRINIVIL,ZESTRIL) 10 MG tablet Take 1 tablet by mouth Daily.    metFORMIN (GLUCOPHAGE) 500 MG tablet Take 2 tablets by mouth 2 (Two) Times a Day.    Multiple Vitamins-Minerals (EYE VITAMINS PO) Take 1 tablet by mouth Daily.    Ozempic, 1 MG/DOSE, 4 MG/3ML solution pen-injector Inject 1 mg under the skin into the appropriate area as directed 1 (One) Time Per Week.    Premarin 0.625 MG/GM vaginal cream INSERT 0.5 GRAMS VAGINALLY DAILY FOR 3 WEEKS, THEN OFF 1 WEEK AND REPEAT    rosuvastatin (CRESTOR) 5 MG tablet Take 1 tablet by mouth Daily.    SUMAtriptan (IMITREX) 50 MG tablet TAKE ONE TABLET BY MOUTH AT ONSET OF HEADACHE; MAY REPEAT ONE TABLET IN 2 HOURS IF NEEDED. **MAX: 4 TABLETS IN 24 HOURS**    Xiidra 5 % ophthalmic solution Apply 1 drop to eye(s) as directed by provider 2 (Two) Times a Day.    [DISCONTINUED] buPROPion XL (WELLBUTRIN XL) 300 MG 24 hr tablet Take 1 tablet by mouth Daily.    [DISCONTINUED] DULoxetine (CYMBALTA) 60 MG capsule Take 1 capsule by mouth 2 (Two) Times a Day.    [DISCONTINUED] promethazine (PHENERGAN) 25 MG tablet Take 1/2 to 1 tablet by mouth Every 6 (Six) Hours As Needed for Nausea    [DISCONTINUED] Synthroid 112 MCG tablet TAKE ONE TABLET BY MOUTH DAILY    [DISCONTINUED] traZODone (DESYREL) 50 MG tablet TAKE ONE TABLET BY MOUTH MOUTH NIGHTLY AS NEEDED FOR SLEEP    [DISCONTINUED] Acetaminophen (TYLENOL ARTHRITIS EXT RELIEF PO) Take  by mouth. (Patient not taking: Reported on 8/7/2023)    [DISCONTINUED] TiZANidine (ZANAFLEX) 4 MG capsule Take 1 capsule by mouth 3 (Three) Times a Day. (Patient not taking: Reported on 8/7/2023)    [DISCONTINUED]  "traMADol (ULTRAM) 50 MG tablet Take 1 tablet by mouth Every 6 (Six) Hours As Needed for Moderate Pain. (Patient not taking: Reported on 8/7/2023)     No current facility-administered medications on file prior to visit.       Immunization History   Administered Date(s) Administered    COVID-19 (PFIZER) BIVALENT 12+YRS 09/27/2022    COVID-19 (PFIZER) Purple Cap Monovalent 07/30/2020, 08/21/2020, 10/19/2021    COVID-19 (UNSPECIFIED) 07/30/2020, 08/21/2020    DTaP 09/16/2010    Flu Vaccine Intradermal Quad 18-64YR 11/06/2012    Fluzone High Dose =>65 Years ([facility] ONLY) 09/30/2020, 10/04/2021, 09/27/2022    Fluzone High-Dose 65+yrs 10/04/2021, 09/27/2022    Hepatitis A 05/11/2018, 12/10/2018    Influenza Quad Vaccine (Inpatient) 11/13/2013    Pneumococcal Conjugate 13-Valent (PCV13) 07/06/2022    Pneumococcal Conjugate 20-Valent (PCV20) 07/06/2022    Shingrix 07/05/2023    Tdap 05/11/2018       Review of Systems   Constitutional:  Negative for fatigue and fever.   Respiratory:  Negative for cough and shortness of breath.    Cardiovascular:  Negative for chest pain, palpitations and leg swelling.   Gastrointestinal:  Positive for nausea (uses phenergan prn, when meclizine does not help).   Musculoskeletal:  Positive for back pain (left side, lower (tried PT, dry needling helps) since 2016, last lumbar spine x-ray 2021).   Skin:         Needs a rx for triamcinalone for bug bites    Neurological:  Negative for numbness.        Radiating left leg pain to knee       Objective     Vitals:    08/07/23 1118   BP: 107/76   BP Location: Right arm   Patient Position: Sitting   Cuff Size: Large Adult   Pulse: 73   SpO2: 95%   Weight: 80.7 kg (178 lb)   Height: 163.2 cm (64.25\")            Physical Exam  Vitals reviewed.   Constitutional:       General: She is not in acute distress.     Appearance: Normal appearance.   Neck:      Vascular: No carotid bruit.   Cardiovascular:      Rate and Rhythm: Normal rate and regular rhythm.     "  Heart sounds: Normal heart sounds. No murmur heard.  Pulmonary:      Effort: Pulmonary effort is normal. No respiratory distress.      Breath sounds: Normal breath sounds.   Musculoskeletal:      Right lower leg: No edema.      Left lower leg: No edema.   Neurological:      Mental Status: She is alert.   Psychiatric:         Mood and Affect: Mood normal.         Behavior: Behavior normal.       Result Review :     The following data was reviewed by: SHANNA Felix on 08/07/2023:                       Assessment and Plan      Diagnoses and all orders for this visit:    1. Other mixed anxiety disorders (Primary)  Assessment & Plan:  Discussed rx's, to eat healthy and get regular exercise, consider seeing a therapist, will send in a rx for hydroxyzine to use prn, follow up if not improving     Orders:  -     hydrOXYzine (ATARAX) 25 MG tablet; Take 1 tablet by mouth 3 (Three) Times a Day As Needed for Anxiety.  Dispense: 60 tablet; Refill: 1  -     buPROPion XL (WELLBUTRIN XL) 300 MG 24 hr tablet; Take 1 tablet by mouth Daily.  Dispense: 90 tablet; Refill: 1  -     DULoxetine (CYMBALTA) 60 MG capsule; Take 1 capsule by mouth 2 (Two) Times a Day.  Dispense: 180 capsule; Refill: 1    2. Low back pain radiating to left lower extremity  Assessment & Plan:  Check a lumbar spine x-ray, she has tried PT and dry needling, symptoms have persisted, consider MRI of lumbar spine     Orders:  -     XR Spine Lumbar 2 or 3 View; Future    3. Nausea  Assessment & Plan:  Refilled her phenergan to use prn     Orders:  -     promethazine (PHENERGAN) 25 MG tablet; Take 1/2 to 1 tablet by mouth Every 6 (Six) Hours As Needed for Nausea  Dispense: 30 tablet; Refill: 1    4. Rash  Assessment & Plan:  Sent a rx for steroid cream to use prn for her rash she gets from insect bites prn     Orders:  -     triamcinolone (KENALOG) 0.1 % cream; Apply 1 application  topically to the appropriate area as directed 2 (Two) Times a Day.   Dispense: 45 g; Refill: 0    5. Type 2 diabetes mellitus without complication, without long-term current use of insulin  Assessment & Plan:  She continues her current rx and seeing her endo, discussed her rx's       6. Hypothyroidism (acquired)  Assessment & Plan:  Continue current dose of rx    Orders:  -     Synthroid 112 MCG tablet; Take 1 tablet by mouth Daily.  Dispense: 90 tablet; Refill: 1    7. Essential hypertension  Assessment & Plan:  Hypertension is stable. Continue to monitor BP at home. Continue current meds. Continue to modify diet and lifestyle.       8. Mixed hyperlipidemia  Assessment & Plan:  Reviewed labs with pt/ Continue current medication and efforts with diet and exercise.         9. Other insomnia  Assessment & Plan:  continue trazodone     Orders:  -     traZODone (DESYREL) 50 MG tablet; Take 1 tablet by mouth Every Night.  Dispense: 90 tablet; Refill: 1        BMI is >= 30 and <35. (Class 1 Obesity). The following options were offered after discussion;: exercise counseling/recommendations and nutrition counseling/recommendations         Follow Up     Return for follow up pending x-ray result.    Patient was given instructions and counseling regarding her condition or for health maintenance advice. Please see specific information pulled into the AVS if appropriate.

## 2023-08-07 NOTE — ASSESSMENT & PLAN NOTE
Check a lumbar spine x-ray, she has tried PT and dry needling, symptoms have persisted, consider MRI of lumbar spine

## 2023-08-07 NOTE — TELEPHONE ENCOUNTER
Rx Refill Note  Requested Prescriptions     Pending Prescriptions Disp Refills    traZODone (DESYREL) 50 MG tablet 30 tablet 2     Sig: Take 1 tablet by mouth At Night As Needed for Sleep.      Last office visit with prescribing clinician: 8/7/2023   Last telemedicine visit with prescribing clinician: Visit date not found   Next office visit with prescribing clinician: Visit date not found  Last refill sent: 04/18/23, #30, 2 refills    Adrienne Dela Cruz LPN  08/07/23, 15:17 EDT

## 2023-08-13 DIAGNOSIS — E78.2 MIXED HYPERLIPIDEMIA: ICD-10-CM

## 2023-08-14 RX ORDER — ROSUVASTATIN CALCIUM 5 MG/1
TABLET, COATED ORAL
Qty: 90 TABLET | Refills: 1 | Status: SHIPPED | OUTPATIENT
Start: 2023-08-14

## 2023-08-16 DIAGNOSIS — I10 ESSENTIAL HYPERTENSION: ICD-10-CM

## 2023-08-16 RX ORDER — HYDROCHLOROTHIAZIDE 25 MG/1
TABLET ORAL
Qty: 90 TABLET | Refills: 1 | Status: SHIPPED | OUTPATIENT
Start: 2023-08-16

## 2023-08-16 RX ORDER — LISINOPRIL 10 MG/1
TABLET ORAL
Qty: 90 TABLET | Refills: 1 | Status: SHIPPED | OUTPATIENT
Start: 2023-08-16

## 2023-08-29 DIAGNOSIS — M54.50 LOW BACK PAIN RADIATING TO LEFT LOWER EXTREMITY: Primary | ICD-10-CM

## 2023-08-29 DIAGNOSIS — M79.605 LOW BACK PAIN RADIATING TO LEFT LOWER EXTREMITY: Primary | ICD-10-CM

## 2023-09-11 DIAGNOSIS — Z83.79 FAMILY HISTORY OF CELIAC DISEASE: Primary | ICD-10-CM

## 2023-09-14 ENCOUNTER — HOSPITAL ENCOUNTER (OUTPATIENT)
Dept: MAMMOGRAPHY | Facility: HOSPITAL | Age: 68
Discharge: HOME | End: 2023-09-14
Payer: COMMERCIAL

## 2023-09-14 ENCOUNTER — LAB (OUTPATIENT)
Dept: LAB | Facility: HOSPITAL | Age: 68
End: 2023-09-14
Payer: COMMERCIAL

## 2023-09-14 ENCOUNTER — CLINICAL SUPPORT (OUTPATIENT)
Dept: FAMILY MEDICINE CLINIC | Age: 68
End: 2023-09-14
Payer: MEDICARE

## 2023-09-14 DIAGNOSIS — Z83.79 FAMILY HISTORY OF CELIAC DISEASE: ICD-10-CM

## 2023-09-14 DIAGNOSIS — Z23 NEED FOR INFLUENZA VACCINATION: Primary | ICD-10-CM

## 2023-09-14 DIAGNOSIS — Z12.31 VISIT FOR SCREENING MAMMOGRAM: ICD-10-CM

## 2023-09-14 PROCEDURE — 86258 DGP ANTIBODY EACH IG CLASS: CPT

## 2023-09-14 PROCEDURE — 86364 TISS TRNSGLTMNASE EA IG CLAS: CPT

## 2023-09-14 PROCEDURE — 77067 SCR MAMMO BI INCL CAD: CPT

## 2023-09-14 PROCEDURE — 82784 ASSAY IGA/IGD/IGG/IGM EACH: CPT

## 2023-09-14 PROCEDURE — 77063 BREAST TOMOSYNTHESIS BI: CPT

## 2023-09-14 PROCEDURE — 36415 COLL VENOUS BLD VENIPUNCTURE: CPT

## 2023-09-15 DIAGNOSIS — E11.9 TYPE 2 DIABETES MELLITUS WITHOUT COMPLICATION, WITHOUT LONG-TERM CURRENT USE OF INSULIN: ICD-10-CM

## 2023-09-15 LAB
GLIADIN PEPTIDE IGA SER-ACNC: 6 UNITS (ref 0–19)
IGA SERPL-MCNC: 214 MG/DL (ref 87–352)
TTG IGA SER-ACNC: <2 U/ML (ref 0–3)

## 2023-09-19 NOTE — TELEPHONE ENCOUNTER
Rx Refill Note  Requested Prescriptions     Pending Prescriptions Disp Refills    SUMAtriptan (IMITREX) 50 MG tablet [Pharmacy Med Name: SUMAtriptan SUCC 50 MG TABLET] 9 tablet 2     Sig: TAKE ONE TABLET BY MOUTH AT ONSET OF HEADACHE; MAY REPEAT ONE TABLET IN 2 HOURS IF NEEDED. *MAX 4 TABLETS IN 24 HOURS*      Last office visit with prescribing clinician: 8/7/2023   Last telemedicine visit with prescribing clinician: Visit date not found   Next office visit with prescribing clinician: Visit date not found  Last refill sent: 05/08/23, #9, 2 refills    Adrienne Dela Cruz LPN  09/19/23, 08:59 EDT

## 2023-09-20 RX ORDER — SUMATRIPTAN 50 MG/1
TABLET, FILM COATED ORAL
Qty: 9 TABLET | Refills: 2 | Status: SHIPPED | OUTPATIENT
Start: 2023-09-20

## 2023-10-24 DIAGNOSIS — E11.9 TYPE 2 DIABETES MELLITUS WITHOUT COMPLICATION, WITHOUT LONG-TERM CURRENT USE OF INSULIN: ICD-10-CM

## 2023-10-24 RX ORDER — BLOOD-GLUCOSE SENSOR
EACH MISCELLANEOUS
Qty: 6 EACH | Refills: 1 | Status: SHIPPED | OUTPATIENT
Start: 2023-10-24

## 2024-01-18 DIAGNOSIS — I10 ESSENTIAL HYPERTENSION: ICD-10-CM

## 2024-01-18 RX ORDER — ATENOLOL 25 MG/1
25 TABLET ORAL DAILY
Qty: 90 TABLET | Refills: 0 | Status: SHIPPED | OUTPATIENT
Start: 2024-01-18

## 2024-01-30 ENCOUNTER — OFFICE VISIT (OUTPATIENT)
Dept: FAMILY MEDICINE CLINIC | Age: 69
End: 2024-01-30
Payer: COMMERCIAL

## 2024-01-30 ENCOUNTER — LAB (OUTPATIENT)
Dept: LAB | Facility: HOSPITAL | Age: 69
End: 2024-01-30
Payer: COMMERCIAL

## 2024-01-30 VITALS
HEART RATE: 69 BPM | TEMPERATURE: 97.3 F | BODY MASS INDEX: 28.95 KG/M2 | DIASTOLIC BLOOD PRESSURE: 71 MMHG | SYSTOLIC BLOOD PRESSURE: 106 MMHG | WEIGHT: 169.6 LBS | HEIGHT: 64 IN

## 2024-01-30 DIAGNOSIS — E03.9 HYPOTHYROIDISM (ACQUIRED): ICD-10-CM

## 2024-01-30 DIAGNOSIS — E11.9 TYPE 2 DIABETES MELLITUS WITHOUT COMPLICATION, WITHOUT LONG-TERM CURRENT USE OF INSULIN: Primary | ICD-10-CM

## 2024-01-30 DIAGNOSIS — G47.09 OTHER INSOMNIA: ICD-10-CM

## 2024-01-30 DIAGNOSIS — F41.3 OTHER MIXED ANXIETY DISORDERS: ICD-10-CM

## 2024-01-30 DIAGNOSIS — I10 ESSENTIAL HYPERTENSION: ICD-10-CM

## 2024-01-30 DIAGNOSIS — E78.2 MIXED HYPERLIPIDEMIA: ICD-10-CM

## 2024-01-30 DIAGNOSIS — E11.9 TYPE 2 DIABETES MELLITUS WITHOUT COMPLICATION, WITHOUT LONG-TERM CURRENT USE OF INSULIN: ICD-10-CM

## 2024-01-30 LAB
ALBUMIN SERPL-MCNC: 4.2 G/DL (ref 3.5–5.2)
ALBUMIN/GLOB SERPL: 1.6 G/DL
ALP SERPL-CCNC: 103 U/L (ref 39–117)
ALT SERPL W P-5'-P-CCNC: 14 U/L (ref 1–33)
ANION GAP SERPL CALCULATED.3IONS-SCNC: 12 MMOL/L (ref 5–15)
AST SERPL-CCNC: 15 U/L (ref 1–32)
BILIRUB SERPL-MCNC: 0.4 MG/DL (ref 0–1.2)
BUN SERPL-MCNC: 21 MG/DL (ref 8–23)
BUN/CREAT SERPL: 16 (ref 7–25)
CALCIUM SPEC-SCNC: 9.6 MG/DL (ref 8.6–10.5)
CHLORIDE SERPL-SCNC: 100 MMOL/L (ref 98–107)
CHOLEST SERPL-MCNC: 137 MG/DL (ref 0–200)
CO2 SERPL-SCNC: 28 MMOL/L (ref 22–29)
CREAT SERPL-MCNC: 1.31 MG/DL (ref 0.57–1)
EGFRCR SERPLBLD CKD-EPI 2021: 44.5 ML/MIN/1.73
GLOBULIN UR ELPH-MCNC: 2.6 GM/DL
GLUCOSE SERPL-MCNC: 108 MG/DL (ref 65–99)
HDLC SERPL-MCNC: 55 MG/DL (ref 40–60)
LDLC SERPL CALC-MCNC: 66 MG/DL (ref 0–100)
LDLC/HDLC SERPL: 1.19 {RATIO}
POTASSIUM SERPL-SCNC: 4.1 MMOL/L (ref 3.5–5.2)
PROT SERPL-MCNC: 6.8 G/DL (ref 6–8.5)
SODIUM SERPL-SCNC: 140 MMOL/L (ref 136–145)
TRIGL SERPL-MCNC: 84 MG/DL (ref 0–150)
TSH SERPL DL<=0.05 MIU/L-ACNC: 3.55 UIU/ML (ref 0.27–4.2)
VLDLC SERPL-MCNC: 16 MG/DL (ref 5–40)

## 2024-01-30 PROCEDURE — 36415 COLL VENOUS BLD VENIPUNCTURE: CPT

## 2024-01-30 PROCEDURE — 80053 COMPREHEN METABOLIC PANEL: CPT

## 2024-01-30 PROCEDURE — 80061 LIPID PANEL: CPT

## 2024-01-30 PROCEDURE — 84443 ASSAY THYROID STIM HORMONE: CPT | Performed by: NURSE PRACTITIONER

## 2024-01-30 RX ORDER — ATENOLOL 25 MG/1
25 TABLET ORAL DAILY
Qty: 90 TABLET | Refills: 1 | Status: SHIPPED | OUTPATIENT
Start: 2024-01-30

## 2024-01-30 RX ORDER — BUPROPION HYDROCHLORIDE 300 MG/1
300 TABLET ORAL DAILY
Qty: 90 TABLET | Refills: 1 | Status: SHIPPED | OUTPATIENT
Start: 2024-01-30

## 2024-01-30 RX ORDER — LISINOPRIL 10 MG/1
10 TABLET ORAL DAILY
Qty: 90 TABLET | Refills: 1 | Status: SHIPPED | OUTPATIENT
Start: 2024-01-30

## 2024-01-30 RX ORDER — HYDROXYZINE HYDROCHLORIDE 25 MG/1
25 TABLET, FILM COATED ORAL 3 TIMES DAILY PRN
Qty: 60 TABLET | Refills: 1 | Status: SHIPPED | OUTPATIENT
Start: 2024-01-30

## 2024-01-30 RX ORDER — DULOXETIN HYDROCHLORIDE 60 MG/1
60 CAPSULE, DELAYED RELEASE ORAL 2 TIMES DAILY
Qty: 180 CAPSULE | Refills: 1 | Status: SHIPPED | OUTPATIENT
Start: 2024-01-30

## 2024-01-30 RX ORDER — TRAZODONE HYDROCHLORIDE 50 MG/1
50 TABLET ORAL NIGHTLY
Qty: 90 TABLET | Refills: 1 | Status: SHIPPED | OUTPATIENT
Start: 2024-01-30

## 2024-01-30 RX ORDER — BUPROPION HYDROCHLORIDE 300 MG/1
300 TABLET ORAL DAILY
Qty: 90 TABLET | Refills: 1 | OUTPATIENT
Start: 2024-01-30

## 2024-01-30 RX ORDER — LEVOTHYROXINE SODIUM 112 MCG
112 TABLET ORAL DAILY
Qty: 90 TABLET | Refills: 1 | Status: SHIPPED | OUTPATIENT
Start: 2024-01-30

## 2024-01-30 RX ORDER — ROSUVASTATIN CALCIUM 5 MG/1
5 TABLET, COATED ORAL DAILY
Qty: 90 TABLET | Refills: 1 | Status: SHIPPED | OUTPATIENT
Start: 2024-01-30

## 2024-01-30 NOTE — TELEPHONE ENCOUNTER
Rx Refill Note  Requested Prescriptions     Pending Prescriptions Disp Refills    buPROPion XL (WELLBUTRIN XL) 300 MG 24 hr tablet [Pharmacy Med Name: buPROPion HCL  MG TABLET] 90 tablet 1     Sig: TAKE 1 TABLET BY MOUTH DAILY      Last office visit with prescribing clinician: 8/7/2023   Last telemedicine visit with prescribing clinician: Visit date not found   Next office visit with prescribing clinician: 1/30/2024  Atypical Antidepressants Protocol Failed    Adrienne Dela Cruz LPN  01/30/24, 08:12 EST

## 2024-01-30 NOTE — PROGRESS NOTES
Chief Complaint  Hypertension (6 month follow up HTN, HLD, thyroid, anxiety, and diabetes. )    Subjective          Guillermina Gardner presents to [facility]    History of Present Illness  Diabetes:  Current medication: metformin (needs refills) & mounjaro, sees endo at [facility] for this rx  B12 was low and abnormal renal function   Tolerating medication: Yes  Micro album ration normal 11-13-23  Last eye exam: UTD  Last foot exam: 1-2023  At home BS ranges: <120  Lab Results       Component                Value               Date                       HGBA1C                   6.70 (H)            06/26/2023            (It was a 6.0 on 11-13-23)    Hypothyroidism  Current rx: synthroid 112  Tolerating rx:yes first thing in am  Refills needed Yes to [facility]   Lab Results       Component                Value               Date                       TSH                      1.610               08/03/2023                Hypertension:  Current medication: atenolol, HCTZ and lisinopril   Tolerating Medication: Yes  Checking BP at home and it is: occ runs 110/60-70  Needs refills: Yes to [facility]   Labs:  Lab Results       Component                Value               Date                       GLUCOSE                  152 (H)             06/26/2023                 BUN                      12                  06/26/2023                 CREATININE               0.94                06/26/2023                 EGFRIFNONA               62                  01/03/2022                 BCR                      12.8                06/26/2023                 K                        4.7                 06/26/2023                 CO2                      27.0                06/26/2023                 CALCIUM                  9.9                 06/26/2023                 ALBUMIN                  4.3                 06/26/2023                 LABIL2                   1.4                 04/21/2021                  AST                      24                  2023                 ALT                      23                  2023              Hyperlipidemia  Current medication: crestor   Tolerating medication: Yes  Needs Refill: Yes alonso     Lab Results       Component                Value               Date                       CHOL                     143                 2023                 CHLPL                    148                 2021                 TRIG                     65                  2023                 HDL                      64 (H)              2023                 LDL                      66                  2023                Anxiety/ Depression/insomnia   Current medication/wellbutrin, cymbalta, trazodone, hydroxyzine prn    Tolerating medication Yes but feels like not working   Stressors: nothing new, family challenges    Current symptoms:irritable     PAST MEDICAL HISTORY changes since :       Seen neph and released last month     covid + 2-    Dizziness - Seen Dr. Almaguer         GYNECOLOGICAL HISTORY:             PREVENTIVE HEALTH MAINTENANCE             Hepatitis C Medicare Screening: was last done 3-17-18; negative           Surgical History:         : X 2;     Lap Band ; Procedures: Ablation     Positive for    thumb joint left replaced/repaired 8-6-15; and    right thumb replaced 18;;     COLONOSCOPY: was last done 2016 with normal results Dr Rosa Forte         Family History:     Father:  at age 89;  Type 2 Diabetes; Hypothyroidism;  Macular degeneration     Mother:  at age 90;  Hypertension;  Dementia     Sister(s): Hypertension; Endocrine Hypothyroidism         Social History:     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[employer])     Marital Status:      Children: 2 children               Past Medical History:   Diagnosis Date    Acute maxillary sinusitis,  unspecified     Acute upper respiratory infection, unspecified     Anxiety disorder, unspecified     Anxiety with depression     Candidiasis of vulva and vagina     Dizziness     Dizziness and giddiness     Dysuria     Essential hypertension     Essential hypertension     Fatigue     Fever, unspecified     Foot pain     Headache     Hip pain     Hypercholesterolemia     Hypothyroidism (acquired)     Joint pain     Long term (current) use of opiate analgesic     Low back pain     Mixed hyperlipidemia     Other long term (current) drug therapy     Other specified hypothyroidism     Pain in right hip     Thumb pain     Type 2 diabetes mellitus without complication     Unspecified menopausal and perimenopausal disorder        Allergies   Allergen Reactions    Tessalon [Benzonatate] Palpitations    Methyldopa Itching        Past Surgical History:   Procedure Laterality Date     SECTION      X 2    COLONOSCOPY  2016    normal results    JOINT REPLACEMENT Bilateral     left thumb replaced 8/6/15  , right thumb replaced 2018    LAPAROSCOPIC GASTRIC BANDING      OTHER SURGICAL HISTORY      ablation        Social History     Tobacco Use    Smoking status: Former     Packs/day: 0.50     Years: 41.00     Additional pack years: 0.00     Total pack years: 20.50     Types: Cigarettes     Quit date:      Years since quittin.1     Passive exposure: Past    Smokeless tobacco: Never   Substance Use Topics    Alcohol use: Defer       Family History   Problem Relation Age of Onset    Dementia Mother          at age 90    Hypertension Mother     Diabetes type II Father          at age 89    Hypothyroidism Father     Macular degeneration Father     Hypertension Sister     Hypothyroidism Sister         endocrine        Health Maintenance Due   Topic Date Due    ANNUAL WELLNESS VISIT  2023    DIABETIC FOOT EXAM  01/10/2024        Current Outpatient Medications on File Prior to Visit   Medication  Sig    cholecalciferol (VITAMIN D3) 25 MCG (1000 UT) tablet Take 1 tablet by mouth Daily.    coenzyme Q10 100 MG capsule Take 1 tablet by mouth Daily.    Continuous Blood Gluc Sensor (FreeStyle Fredrick 3 Sensor) misc USE ONE SENSOR EVERY 14 DAYS    hydroCHLOROthiazide (HYDRODIURIL) 25 MG tablet TAKE ONE TABLET BY MOUTH DAILY    IRON, FERROUS GLUCONATE, PO Take  by mouth.    Multiple Vitamins-Minerals (EYE VITAMINS PO) Take 1 tablet by mouth Daily.    Premarin 0.625 MG/GM vaginal cream INSERT 0.5 GRAMS VAGINALLY DAILY FOR 3 WEEKS, THEN OFF 1 WEEK AND REPEAT    promethazine (PHENERGAN) 25 MG tablet Take 1/2 to 1 tablet by mouth Every 6 (Six) Hours As Needed for Nausea    silver sulfadiazine (Silvadene) 1 % cream Apply 1 application  topically to the appropriate area as directed 2 (Two) Times a Day. (Patient taking differently: Apply 1 Application topically to the appropriate area as directed As Needed.)    SUMAtriptan (IMITREX) 50 MG tablet TAKE ONE TABLET BY MOUTH AT ONSET OF HEADACHE; MAY REPEAT ONE TABLET IN 2 HOURS IF NEEDED. *MAX 4 TABLETS IN 24 HOURS*    Tirzepatide (MOUNJARO) 5 MG/0.5ML solution pen-injector pen Inject 0.5 mL under the skin into the appropriate area as directed Every 7 (Seven) Days.    triamcinolone (KENALOG) 0.1 % cream Apply 1 application  topically to the appropriate area as directed 2 (Two) Times a Day.    Xiidra 5 % ophthalmic solution Apply 1 drop to eye(s) as directed by provider 2 (Two) Times a Day.    [DISCONTINUED] atenolol (TENORMIN) 25 MG tablet Take 1 tablet by mouth Daily. *Due appt in February*    [DISCONTINUED] buPROPion XL (WELLBUTRIN XL) 300 MG 24 hr tablet Take 1 tablet by mouth Daily.    [DISCONTINUED] DULoxetine (CYMBALTA) 60 MG capsule Take 1 capsule by mouth 2 (Two) Times a Day.    [DISCONTINUED] hydrOXYzine (ATARAX) 25 MG tablet Take 1 tablet by mouth 3 (Three) Times a Day As Needed for Anxiety.    [DISCONTINUED] lisinopril (PRINIVIL,ZESTRIL) 10 MG tablet TAKE ONE TABLET  "BY MOUTH DAILY    [DISCONTINUED] metFORMIN (GLUCOPHAGE) 500 MG tablet TAKE TWO TABLETS BY MOUTH TWICE A DAY    [DISCONTINUED] rosuvastatin (CRESTOR) 5 MG tablet TAKE ONE TABLET BY MOUTH DAILY    [DISCONTINUED] Synthroid 112 MCG tablet Take 1 tablet by mouth Daily.    [DISCONTINUED] traZODone (DESYREL) 50 MG tablet Take 1 tablet by mouth Every Night.    [DISCONTINUED] Ozempic, 1 MG/DOSE, 4 MG/3ML solution pen-injector Inject 1 mg under the skin into the appropriate area as directed 1 (One) Time Per Week. (Patient not taking: Reported on 1/30/2024)     No current facility-administered medications on file prior to visit.       Immunization History   Administered Date(s) Administered    ABRYSVO (RSV, 60+ or pregnant women 32-36 wks) 09/14/2023    COVID-19 (PFIZER) BIVALENT 12+YRS 09/27/2022    COVID-19 (PFIZER) Purple Cap Monovalent 07/30/2020, 08/21/2020, 10/19/2021    COVID-19 (UNSPECIFIED) 07/30/2020, 08/21/2020    COVID-19 F23 (PFIZER) 12YRS+ (COMIRNATY) 11/18/2023    DTaP 09/16/2010    Flu Vaccine Intradermal Quad 18-64YR 11/06/2012    Fluzone High Dose =>65 Years ([employer] ONLY) 09/30/2020, 10/04/2021, 09/27/2022    Fluzone High-Dose 65+yrs 10/04/2021, 09/27/2022, 09/14/2023    Hepatitis A 05/11/2018, 12/10/2018    Influenza Quad Vaccine (Inpatient) 11/13/2013    Pneumococcal Conjugate 13-Valent (PCV13) 07/06/2022    Pneumococcal Conjugate 20-Valent (PCV20) 07/06/2022    Shingrix 07/05/2023, 09/02/2023    Tdap 05/11/2018       Review of Systems   Constitutional:  Negative for fatigue and fever.   Respiratory:  Negative for cough and shortness of breath.    Cardiovascular:  Negative for chest pain, palpitations and leg swelling.        Objective     Vitals:    01/30/24 0849   BP: 106/71   BP Location: Left arm   Patient Position: Sitting   Cuff Size: Large Adult   Pulse: 69   Temp: 97.3 °F (36.3 °C)   TempSrc: Oral   Weight: 76.9 kg (169 lb 9.6 oz)   Height: 163.2 cm (64.25\")            Physical " Exam  Constitutional:       Appearance: Normal appearance.   Neck:      Vascular: No carotid bruit.   Cardiovascular:      Rate and Rhythm: Normal rate and regular rhythm.      Pulses:           Posterior tibial pulses are 2+ on the right side and 2+ on the left side.      Heart sounds: No murmur heard.  Pulmonary:      Effort: No respiratory distress.      Breath sounds: Normal breath sounds.   Musculoskeletal:         General: No swelling.   Feet:      Right foot:      Protective Sensation: 3 sites tested.  3 sites sensed.      Skin integrity: Skin integrity normal. Dry skin present. No ulcer or blister.      Toenail Condition: Right toenails are normal.      Left foot:      Protective Sensation: 3 sites tested.  3 sites sensed.      Skin integrity: Skin integrity normal. Callus (lateral fifth toe) and dry skin present. No ulcer, blister or erythema.      Toenail Condition: Left toenails are normal.      Comments: Diabetic Foot Exam Performed and Monofilament Test Performed     Neurological:      Mental Status: She is alert.   Psychiatric:         Mood and Affect: Mood normal.         Thought Content: Thought content normal.         Result Review :     The following data was reviewed by: SHANNA Felix on 01/30/2024:                       Assessment and Plan      Diagnoses and all orders for this visit:    1. Type 2 diabetes mellitus without complication, without long-term current use of insulin (Primary)  Assessment & Plan:  continue to monitor her BP and see her eye specialist and monitor her feet daily; Reviewed last lab in 11-13-23, follow up with endo     Orders:  -     Lipid panel; Future  -     Comprehensive metabolic panel; Future  -     metFORMIN (GLUCOPHAGE) 500 MG tablet; Take 2 tablets by mouth 2 (Two) Times a Day.  Dispense: 360 tablet; Refill: 1    2. Essential hypertension  Assessment & Plan:  Hypertension is stable. Continue to monitor BP at home. Continue current meds. Continue to  modify diet and lifestyle.     Orders:  -     lisinopril (PRINIVIL,ZESTRIL) 10 MG tablet; Take 1 tablet by mouth Daily.  Dispense: 90 tablet; Refill: 1  -     atenolol (TENORMIN) 25 MG tablet; Take 1 tablet by mouth Daily.  Dispense: 90 tablet; Refill: 1    3. Mixed hyperlipidemia  Assessment & Plan:  Continue current medication and efforts with diet and exercise.       Orders:  -     Lipid panel; Future  -     rosuvastatin (CRESTOR) 5 MG tablet; Take 1 tablet by mouth Daily.  Dispense: 90 tablet; Refill: 1    4. Hypothyroidism (acquired)  Assessment & Plan:  Pending lab results, if TSH is stable, continue current medication and to follow up in 6 months. Make sure to take medication on an empty stomach and with no other medications.       Orders:  -     TSH Rfx On Abnormal To Free T4  -     Synthroid 112 MCG tablet; Take 1 tablet by mouth Daily.  Dispense: 90 tablet; Refill: 1    5. Other mixed anxiety disorders  Assessment & Plan:  Will lower cymbalta to 60 mg daily, consider [facility] referral (but will send in the 2 a day)    Orders:  -     hydrOXYzine (ATARAX) 25 MG tablet; Take 1 tablet by mouth 3 (Three) Times a Day As Needed for Anxiety.  Dispense: 60 tablet; Refill: 1  -     buPROPion XL (WELLBUTRIN XL) 300 MG 24 hr tablet; Take 1 tablet by mouth Daily.  Dispense: 90 tablet; Refill: 1  -     DULoxetine (CYMBALTA) 60 MG capsule; Take 1 capsule by mouth 2 (Two) Times a Day.  Dispense: 180 capsule; Refill: 1    6. Other insomnia  Assessment & Plan:  continue trazodone     Orders:  -     traZODone (DESYREL) 50 MG tablet; Take 1 tablet by mouth Every Night.  Dispense: 90 tablet; Refill: 1                    Follow Up     Return for followup pending lab results.    Patient was given instructions and counseling regarding her condition or for health maintenance advice. Please see specific information pulled into the AVS if appropriate.

## 2024-01-30 NOTE — ASSESSMENT & PLAN NOTE
Pending lab results, if TSH is stable, continue current medication and to follow up in 6 months. Make sure to take medication on an empty stomach and with no other medications.

## 2024-01-30 NOTE — ASSESSMENT & PLAN NOTE
continue to monitor her BP and see her eye specialist and monitor her feet daily; Reviewed last lab in 11-13-23, follow up with manuel

## 2024-02-14 DIAGNOSIS — N28.9 ABNORMAL RENAL FUNCTION: Primary | ICD-10-CM

## 2024-03-20 DIAGNOSIS — E11.9 TYPE 2 DIABETES MELLITUS WITHOUT COMPLICATION, WITHOUT LONG-TERM CURRENT USE OF INSULIN: ICD-10-CM

## 2024-03-21 RX ORDER — BLOOD-GLUCOSE SENSOR
EACH MISCELLANEOUS
Qty: 6 EACH | Refills: 0 | Status: SHIPPED | OUTPATIENT
Start: 2024-03-21

## 2024-03-21 NOTE — TELEPHONE ENCOUNTER
Rx Refill Note  Requested Prescriptions     Pending Prescriptions Disp Refills    Continuous Blood Gluc Sensor (FreeStyle Fredrick 3 Sensor) misc [Pharmacy Med Name: FREESTYLE RFEDRICK 3 SENSOR]       Sig: USE ONE SENSOR EVERY 14 DAYS      Last office visit with prescribing clinician: 1/30/2024   Last telemedicine visit with prescribing clinician: Visit date not found   Next office visit with prescribing clinician: 7/30/2024    Diabetic Supplies Protocol Failed     Adrienne Dela Cruz LPN  03/21/24, 09:15 EDT

## 2024-05-16 DIAGNOSIS — E11.9 TYPE 2 DIABETES MELLITUS WITHOUT COMPLICATION, WITHOUT LONG-TERM CURRENT USE OF INSULIN: ICD-10-CM

## 2024-05-16 RX ORDER — BLOOD-GLUCOSE SENSOR
EACH MISCELLANEOUS
Qty: 6 EACH | Refills: 0 | Status: SHIPPED | OUTPATIENT
Start: 2024-05-16

## 2024-06-25 DIAGNOSIS — R21 RASH: ICD-10-CM

## 2024-06-26 RX ORDER — TRIAMCINOLONE ACETONIDE 1 MG/G
CREAM TOPICAL
Qty: 45 G | Refills: 0 | Status: SHIPPED | OUTPATIENT
Start: 2024-06-26

## 2024-07-26 DIAGNOSIS — F41.3 OTHER MIXED ANXIETY DISORDERS: ICD-10-CM

## 2024-07-26 DIAGNOSIS — N95.9 MENOPAUSAL AND PERIMENOPAUSAL DISORDER: ICD-10-CM

## 2024-07-26 RX ORDER — BUPROPION HYDROCHLORIDE 300 MG/1
300 TABLET ORAL DAILY
Qty: 90 TABLET | Refills: 1 | Status: SHIPPED | OUTPATIENT
Start: 2024-07-26

## 2024-07-26 RX ORDER — CONJUGATED ESTROGENS 0.62 MG/G
CREAM VAGINAL
Qty: 30 G | Refills: 2 | Status: SHIPPED | OUTPATIENT
Start: 2024-07-26

## 2024-07-26 NOTE — TELEPHONE ENCOUNTER
Failed protocol    LOV  1/30/24      LF 4/26/24  #90  BUPROP 24 XL 300MG TAB     LF  2/28/24   Premarin 0.625 MG/GM vaginal cream    Comprehensive metabolic panel (01/30/2024 09:23)

## 2024-07-30 ENCOUNTER — OFFICE VISIT (OUTPATIENT)
Dept: FAMILY MEDICINE CLINIC | Age: 69
End: 2024-07-30
Payer: MEDICARE

## 2024-07-30 ENCOUNTER — LAB (OUTPATIENT)
Dept: LAB | Facility: HOSPITAL | Age: 69
End: 2024-07-30
Payer: MEDICARE

## 2024-07-30 VITALS
DIASTOLIC BLOOD PRESSURE: 43 MMHG | WEIGHT: 171.2 LBS | BODY MASS INDEX: 29.23 KG/M2 | TEMPERATURE: 98 F | SYSTOLIC BLOOD PRESSURE: 96 MMHG | HEART RATE: 70 BPM | HEIGHT: 64 IN

## 2024-07-30 DIAGNOSIS — R11.0 NAUSEA: ICD-10-CM

## 2024-07-30 DIAGNOSIS — N18.30 CKD STAGE 3 DUE TO TYPE 2 DIABETES MELLITUS: ICD-10-CM

## 2024-07-30 DIAGNOSIS — G47.09 OTHER INSOMNIA: ICD-10-CM

## 2024-07-30 DIAGNOSIS — E11.21 TYPE 2 DIABETES MELLITUS WITH DIABETIC NEPHROPATHY, WITHOUT LONG-TERM CURRENT USE OF INSULIN: ICD-10-CM

## 2024-07-30 DIAGNOSIS — I10 ESSENTIAL HYPERTENSION: Primary | ICD-10-CM

## 2024-07-30 DIAGNOSIS — E11.22 CKD STAGE 3 DUE TO TYPE 2 DIABETES MELLITUS: ICD-10-CM

## 2024-07-30 DIAGNOSIS — E78.2 MIXED HYPERLIPIDEMIA: ICD-10-CM

## 2024-07-30 DIAGNOSIS — F41.3 OTHER MIXED ANXIETY DISORDERS: ICD-10-CM

## 2024-07-30 DIAGNOSIS — E03.9 HYPOTHYROIDISM (ACQUIRED): ICD-10-CM

## 2024-07-30 LAB
ALBUMIN SERPL-MCNC: 3.9 G/DL (ref 3.5–5.2)
ALBUMIN/GLOB SERPL: 1.5 G/DL
ALP SERPL-CCNC: 121 U/L (ref 39–117)
ALT SERPL W P-5'-P-CCNC: 18 U/L (ref 1–33)
ANION GAP SERPL CALCULATED.3IONS-SCNC: 10.5 MMOL/L (ref 5–15)
AST SERPL-CCNC: 21 U/L (ref 1–32)
BASOPHILS # BLD AUTO: 0.05 10*3/MM3 (ref 0–0.2)
BASOPHILS NFR BLD AUTO: 0.6 % (ref 0–1.5)
BILIRUB SERPL-MCNC: 0.3 MG/DL (ref 0–1.2)
BUN SERPL-MCNC: 24 MG/DL (ref 8–23)
BUN/CREAT SERPL: 17.9 (ref 7–25)
CALCIUM SPEC-SCNC: 9.5 MG/DL (ref 8.6–10.5)
CHLORIDE SERPL-SCNC: 102 MMOL/L (ref 98–107)
CHOLEST SERPL-MCNC: 150 MG/DL (ref 0–200)
CO2 SERPL-SCNC: 27.5 MMOL/L (ref 22–29)
CREAT SERPL-MCNC: 1.34 MG/DL (ref 0.57–1)
DEPRECATED RDW RBC AUTO: 49.5 FL (ref 37–54)
EGFRCR SERPLBLD CKD-EPI 2021: 43.3 ML/MIN/1.73
EOSINOPHIL # BLD AUTO: 0.38 10*3/MM3 (ref 0–0.4)
EOSINOPHIL NFR BLD AUTO: 4.7 % (ref 0.3–6.2)
ERYTHROCYTE [DISTWIDTH] IN BLOOD BY AUTOMATED COUNT: 14.7 % (ref 12.3–15.4)
GLOBULIN UR ELPH-MCNC: 2.6 GM/DL
GLUCOSE SERPL-MCNC: 110 MG/DL (ref 65–99)
HBA1C MFR BLD: 6.3 % (ref 4.8–5.6)
HCT VFR BLD AUTO: 38.6 % (ref 34–46.6)
HDLC SERPL-MCNC: 61 MG/DL (ref 40–60)
HGB BLD-MCNC: 12.1 G/DL (ref 12–15.9)
IMM GRANULOCYTES # BLD AUTO: 0.02 10*3/MM3 (ref 0–0.05)
IMM GRANULOCYTES NFR BLD AUTO: 0.2 % (ref 0–0.5)
LDLC SERPL CALC-MCNC: 74 MG/DL (ref 0–100)
LDLC/HDLC SERPL: 1.19 {RATIO}
LYMPHOCYTES # BLD AUTO: 3.01 10*3/MM3 (ref 0.7–3.1)
LYMPHOCYTES NFR BLD AUTO: 37.4 % (ref 19.6–45.3)
MCH RBC QN AUTO: 28.6 PG (ref 26.6–33)
MCHC RBC AUTO-ENTMCNC: 31.3 G/DL (ref 31.5–35.7)
MCV RBC AUTO: 91.3 FL (ref 79–97)
MONOCYTES # BLD AUTO: 0.67 10*3/MM3 (ref 0.1–0.9)
MONOCYTES NFR BLD AUTO: 8.3 % (ref 5–12)
NEUTROPHILS NFR BLD AUTO: 3.92 10*3/MM3 (ref 1.7–7)
NEUTROPHILS NFR BLD AUTO: 48.8 % (ref 42.7–76)
NRBC BLD AUTO-RTO: 0 /100 WBC (ref 0–0.2)
PLATELET # BLD AUTO: 325 10*3/MM3 (ref 140–450)
PMV BLD AUTO: 10.5 FL (ref 6–12)
POTASSIUM SERPL-SCNC: 4.5 MMOL/L (ref 3.5–5.2)
PROT SERPL-MCNC: 6.5 G/DL (ref 6–8.5)
RBC # BLD AUTO: 4.23 10*6/MM3 (ref 3.77–5.28)
SODIUM SERPL-SCNC: 140 MMOL/L (ref 136–145)
T4 FREE SERPL-MCNC: 1.37 NG/DL (ref 0.92–1.68)
TRIGL SERPL-MCNC: 81 MG/DL (ref 0–150)
TSH SERPL DL<=0.05 MIU/L-ACNC: 4.61 UIU/ML (ref 0.27–4.2)
VLDLC SERPL-MCNC: 15 MG/DL (ref 5–40)
WBC NRBC COR # BLD AUTO: 8.05 10*3/MM3 (ref 3.4–10.8)

## 2024-07-30 PROCEDURE — 3074F SYST BP LT 130 MM HG: CPT | Performed by: NURSE PRACTITIONER

## 2024-07-30 PROCEDURE — 84443 ASSAY THYROID STIM HORMONE: CPT | Performed by: NURSE PRACTITIONER

## 2024-07-30 PROCEDURE — 99214 OFFICE O/P EST MOD 30 MIN: CPT | Performed by: NURSE PRACTITIONER

## 2024-07-30 PROCEDURE — 83036 HEMOGLOBIN GLYCOSYLATED A1C: CPT

## 2024-07-30 PROCEDURE — 1125F AMNT PAIN NOTED PAIN PRSNT: CPT | Performed by: NURSE PRACTITIONER

## 2024-07-30 PROCEDURE — 36415 COLL VENOUS BLD VENIPUNCTURE: CPT | Performed by: NURSE PRACTITIONER

## 2024-07-30 PROCEDURE — 80053 COMPREHEN METABOLIC PANEL: CPT | Performed by: NURSE PRACTITIONER

## 2024-07-30 PROCEDURE — 85025 COMPLETE CBC W/AUTO DIFF WBC: CPT

## 2024-07-30 PROCEDURE — 84439 ASSAY OF FREE THYROXINE: CPT | Performed by: NURSE PRACTITIONER

## 2024-07-30 PROCEDURE — 3078F DIAST BP <80 MM HG: CPT | Performed by: NURSE PRACTITIONER

## 2024-07-30 PROCEDURE — 80061 LIPID PANEL: CPT | Performed by: NURSE PRACTITIONER

## 2024-07-30 RX ORDER — ATENOLOL 25 MG/1
25 TABLET ORAL DAILY
Qty: 90 TABLET | Refills: 1 | Status: SHIPPED | OUTPATIENT
Start: 2024-07-30

## 2024-07-30 RX ORDER — TRAZODONE HYDROCHLORIDE 50 MG/1
50 TABLET ORAL NIGHTLY
Qty: 90 TABLET | Refills: 1 | Status: SHIPPED | OUTPATIENT
Start: 2024-07-30 | End: 2024-07-30

## 2024-07-30 RX ORDER — LEVOTHYROXINE SODIUM 112 MCG
112 TABLET ORAL DAILY
Qty: 90 TABLET | Refills: 1 | Status: SHIPPED | OUTPATIENT
Start: 2024-07-30

## 2024-07-30 RX ORDER — LEVOTHYROXINE SODIUM 112 MCG
112 TABLET ORAL DAILY
Qty: 90 TABLET | Refills: 1 | Status: SHIPPED | OUTPATIENT
Start: 2024-07-30 | End: 2024-07-30

## 2024-07-30 RX ORDER — LISINOPRIL 10 MG/1
10 TABLET ORAL DAILY
Qty: 90 TABLET | Refills: 1 | Status: SHIPPED | OUTPATIENT
Start: 2024-07-30

## 2024-07-30 RX ORDER — PROMETHAZINE HYDROCHLORIDE 25 MG/1
25 TABLET ORAL EVERY 6 HOURS PRN
Qty: 30 TABLET | Refills: 1 | Status: SHIPPED | OUTPATIENT
Start: 2024-07-30

## 2024-07-30 RX ORDER — TRAZODONE HYDROCHLORIDE 50 MG/1
50 TABLET ORAL NIGHTLY
Qty: 90 TABLET | Refills: 1 | Status: SHIPPED | OUTPATIENT
Start: 2024-07-30

## 2024-07-30 RX ORDER — BUPROPION HYDROCHLORIDE 300 MG/1
300 TABLET ORAL DAILY
Qty: 90 TABLET | Refills: 1 | Status: SHIPPED | OUTPATIENT
Start: 2024-07-30

## 2024-07-30 RX ORDER — DULOXETIN HYDROCHLORIDE 60 MG/1
60 CAPSULE, DELAYED RELEASE ORAL 2 TIMES DAILY
Qty: 180 CAPSULE | Refills: 1 | Status: SHIPPED | OUTPATIENT
Start: 2024-07-30

## 2024-07-30 RX ORDER — DULOXETIN HYDROCHLORIDE 60 MG/1
60 CAPSULE, DELAYED RELEASE ORAL 2 TIMES DAILY
Qty: 180 CAPSULE | Refills: 1 | Status: SHIPPED | OUTPATIENT
Start: 2024-07-30 | End: 2024-07-30

## 2024-07-30 RX ORDER — BUPROPION HYDROCHLORIDE 300 MG/1
300 TABLET ORAL DAILY
Qty: 90 TABLET | Refills: 1 | Status: SHIPPED | OUTPATIENT
Start: 2024-07-30 | End: 2024-07-30

## 2024-07-30 NOTE — PROGRESS NOTES
Chief Complaint  Hypertension (6 month follow up HTN, HLD, thyroid, anxiety, diabetes.)    Subjective          Guillermina Gardner presents to [facility]    History of Present Illness  Diabetes:  Sees NP endo/ appt    Current medication: metformin, monjaro   Tolerating medication: Yes  Last eye exam: UTD  Last foot exam: 1-30-24  At home BS ranges: CGM am is 122 avg  Lab Results       Component                Value               Date                       HGBA1C                   6.70 (H)            06/26/2023            (Needs phenergan refilled for nausea prn)     Hypertension:  Current medication: lisinpril & atenolol  Tolerating Medication: Yes  Checking BP at home and it is: 100's/50-60  Needs refills: yes   Labs:  Lab Results       Component                Value               Date                       GLUCOSE                  108 (H)             01/30/2024                 BUN                      21                  01/30/2024                 CREATININE               1.31 (H)            01/30/2024                 EGFRIFNONA               62                  01/03/2022                 BCR                      16.0                01/30/2024                 K                        4.1                 01/30/2024                 CO2                      28.0                01/30/2024                 CALCIUM                  9.6                 01/30/2024                 ALBUMIN                  4.2                 01/30/2024                 LABIL2                   1.4                 04/21/2021                 AST                      15                  01/30/2024                 ALT                      14                  01/30/2024                Hypothyroidism  Current rx: synthroid 112  Tolerating rx: yes takes in am   Refills needed Yes to [facility]   Lab Results       Component                Value               Date                       TSH                      3.550                2024              Hyperlipidemia  Current medication: crestor  Tolerating medication: Yes   Needs Refill: Yes [facility]     Lab Results       Component                Value               Date                       CHOL                     137                 2024                 CHLPL                    148                 2021                 TRIG                     84                  2024                 HDL                      55                  2024                 LDL                      66                  2024              Anxiety/ Depression/ Insomnia  Current medication: Wellbutrin, cymbalta, trazodone & hydroxyzine (has this one)    Tolerating medication: Yes  Stressors: same  Current symptoms: anxious at times   Refills needed: yes the routine rx she takes     Neph  Seeing stephane 24, getting neph labs today as well       PAST MEDICAL HISTORY changes since 2024:         covid + 2-    Dizziness - Seen Dr. Almaguer         GYNECOLOGICAL HISTORY:             PREVENTIVE HEALTH MAINTENANCE             Hepatitis C Medicare Screening: was last done 3-17-18; negative           Surgical History:         : X 2;     Lap Band ; Procedures: Ablation     Positive for    thumb joint left replaced/repaired 8-6-15; and    right thumb replaced 18;;     COLONOSCOPY: was last done 2016 with normal results Dr Rosa Forte         Family History:       Father:  at age 89;  Type 2 Diabetes; Hypothyroidism;  Macular degeneration     Mother:  at age 90;  Hypertension;  Dementia     Sister(s): Hypertension; Endocrine Hypothyroidism         Social History:       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[employer])     Marital Status:      Children: 2 children                       Past Medical History:   Diagnosis Date    Acute maxillary sinusitis, unspecified     Acute upper respiratory infection,  unspecified     Anxiety disorder, unspecified     Anxiety with depression     Candidiasis of vulva and vagina     Dizziness     Dizziness and giddiness     Dysuria     Essential hypertension     Essential hypertension     Fatigue     Fever, unspecified     Foot pain     Headache     Hip pain     Hypercholesterolemia     Hypothyroidism (acquired)     Joint pain     Long term (current) use of opiate analgesic     Low back pain     Mixed hyperlipidemia     Other long term (current) drug therapy     Other specified hypothyroidism     Pain in right hip     Thumb pain     Type 2 diabetes mellitus without complication     Unspecified menopausal and perimenopausal disorder        Allergies   Allergen Reactions    Tessalon [Benzonatate] Palpitations    Methyldopa Itching        Past Surgical History:   Procedure Laterality Date     SECTION      X 2    COLONOSCOPY  2016    normal results    JOINT REPLACEMENT Bilateral     left thumb replaced 8/6/15  , right thumb replaced 2018    LAPAROSCOPIC GASTRIC BANDING      OTHER SURGICAL HISTORY      ablation        Social History     Tobacco Use    Smoking status: Former     Current packs/day: 0.00     Types: Cigarettes     Quit date:      Years since quittin.6     Passive exposure: Past    Smokeless tobacco: Never   Substance Use Topics    Alcohol use: Defer       Family History   Problem Relation Age of Onset    Dementia Mother          at age 90    Hypertension Mother     Diabetes type II Father          at age 89    Hypothyroidism Father     Macular degeneration Father     Hypertension Sister     Hypothyroidism Sister         endocrine        Health Maintenance Due   Topic Date Due    ANNUAL WELLNESS VISIT  2023    COVID-19 Vaccine ( season) 2024    HEMOGLOBIN A1C  2024    DXA SCAN  2024        Current Outpatient Medications on File Prior to Visit   Medication Sig    cholecalciferol (VITAMIN D3) 25 MCG  (1000 UT) tablet Take 1 tablet by mouth Daily.    coenzyme Q10 100 MG capsule Take 1 tablet by mouth Daily.    Continuous Glucose Sensor (FreeStyle Fredrick 3 Sensor) misc APPLY SENSOR TO SKIN FOR CONTINUOUS BLOOD SUGAR MONITORING, REPLACE EVERY 14 DAYS    hydroCHLOROthiazide (HYDRODIURIL) 25 MG tablet TAKE ONE TABLET BY MOUTH DAILY    hydrOXYzine (ATARAX) 25 MG tablet Take 1 tablet by mouth 3 (Three) Times a Day As Needed for Anxiety.    IRON, FERROUS GLUCONATE, PO Take  by mouth.    metFORMIN (GLUCOPHAGE) 500 MG tablet Take 2 tablets by mouth 2 (Two) Times a Day.    Multiple Vitamins-Minerals (EYE VITAMINS PO) Take 1 tablet by mouth Daily.    Premarin 0.625 MG/GM vaginal cream INSERT 1/2 GRAMS VAGINALLY DAILY FOR 3 WEEKS AND THEN OFF FOR 1 WEEK AND REPEAT    rosuvastatin (CRESTOR) 5 MG tablet Take 1 tablet by mouth Daily.    silver sulfadiazine (Silvadene) 1 % cream Apply 1 application  topically to the appropriate area as directed 2 (Two) Times a Day. (Patient taking differently: Apply 1 Application topically to the appropriate area as directed As Needed.)    SUMAtriptan (IMITREX) 50 MG tablet TAKE ONE TABLET BY MOUTH AT ONSET OF HEADACHE; MAY REPEAT ONE TABLET IN 2 HOURS IF NEEDED. *MAX 4 TABLETS IN 24 HOURS*    Tirzepatide (MOUNJARO) 5 MG/0.5ML solution pen-injector pen Inject 0.5 mL under the skin into the appropriate area as directed Every 7 (Seven) Days.    triamcinolone (KENALOG) 0.1 % cream APPLY TO THE AFFECTED AREA(S) 2 TIMES A DAY AS DIRECTED    Xiidra 5 % ophthalmic solution Apply 1 drop to eye(s) as directed by provider 2 (Two) Times a Day.    [DISCONTINUED] atenolol (TENORMIN) 25 MG tablet Take 1 tablet by mouth Daily.    [DISCONTINUED] buPROPion XL (WELLBUTRIN XL) 300 MG 24 hr tablet TAKE 1 TABLET BY MOUTH DAILY    [DISCONTINUED] DULoxetine (CYMBALTA) 60 MG capsule Take 1 capsule by mouth 2 (Two) Times a Day.    [DISCONTINUED] lisinopril (PRINIVIL,ZESTRIL) 10 MG tablet Take 1 tablet by mouth Daily.     "[DISCONTINUED] promethazine (PHENERGAN) 25 MG tablet Take 1/2 to 1 tablet by mouth Every 6 (Six) Hours As Needed for Nausea    [DISCONTINUED] Synthroid 112 MCG tablet Take 1 tablet by mouth Daily.    [DISCONTINUED] traZODone (DESYREL) 50 MG tablet Take 1 tablet by mouth Every Night.    [DISCONTINUED] HYDROcodone-acetaminophen (NORCO) 5-325 MG per tablet Take 1 tablet by mouth Every 6 (Six) Hours As Needed for pain (Patient not taking: Reported on 7/30/2024)     No current facility-administered medications on file prior to visit.       Immunization History   Administered Date(s) Administered    ABRYSVO (RSV, 60+ or pregnant women 32-36 wks) 09/14/2023    COVID-19 (PFIZER) BIVALENT 12+YRS 09/27/2022    COVID-19 (PFIZER) Purple Cap Monovalent 07/30/2020, 08/21/2020, 10/19/2021    COVID-19 (UNSPECIFIED) 07/30/2020, 08/21/2020    COVID-19 F23 (PFIZER) 12YRS+ (COMIRNATY) 11/18/2023    DTaP 09/16/2010    Flu Vaccine Intradermal Quad 18-64YR 11/06/2012    Fluzone High Dose =>65 Years ([employer] ONLY) 09/30/2020, 10/04/2021, 09/27/2022    Fluzone High-Dose 65+yrs 10/04/2021, 09/27/2022, 09/14/2023    Hepatitis A 05/11/2018, 12/10/2018    Influenza Quad Vaccine (Inpatient) 11/13/2013    Pneumococcal Conjugate 13-Valent (PCV13) 07/06/2022    Pneumococcal Conjugate 20-Valent (PCV20) 07/06/2022    Shingrix 07/05/2023, 09/02/2023    Tdap 05/11/2018       Review of Systems   Constitutional:  Negative for fatigue and fever.   Respiratory:  Negative for cough and shortness of breath.    Cardiovascular:  Negative for chest pain, palpitations and leg swelling.        Objective     Vitals:    07/30/24 0902   BP: 96/43   BP Location: Left arm   Patient Position: Sitting   Cuff Size: Adult   Pulse: 70   Temp: 98 °F (36.7 °C)   TempSrc: Oral   Weight: 77.7 kg (171 lb 3.2 oz)   Height: 163.2 cm (64.25\")            Physical Exam  Vitals reviewed.   Constitutional:       General: She is not in acute distress.     Appearance: Normal appearance. "   Neck:      Vascular: No carotid bruit.   Cardiovascular:      Rate and Rhythm: Normal rate and regular rhythm.      Heart sounds: Normal heart sounds. No murmur heard.  Pulmonary:      Effort: Pulmonary effort is normal. No respiratory distress.      Breath sounds: Normal breath sounds.   Musculoskeletal:      Right lower leg: No edema.      Left lower leg: No edema.   Neurological:      Mental Status: She is alert.   Psychiatric:         Mood and Affect: Mood normal.         Behavior: Behavior normal.         Result Review :     The following data was reviewed by: SHANNA Felix on 07/30/2024:                       Assessment and Plan      Diagnoses and all orders for this visit:    1. Essential hypertension (Primary)  Assessment & Plan:  Hypertension is stable. Continue to monitor BP at home. Continue current meds. Continue to modify diet and lifestyle.     Orders:  -     Comprehensive Metabolic Panel  -     lisinopril (PRINIVIL,ZESTRIL) 10 MG tablet; Take 1 tablet by mouth Daily.  Dispense: 90 tablet; Refill: 1  -     atenolol (TENORMIN) 25 MG tablet; Take 1 tablet by mouth Daily.  Dispense: 90 tablet; Refill: 1    2. Mixed hyperlipidemia  Assessment & Plan:   Continue current medication and efforts with diet and exercise.       Orders:  -     Comprehensive Metabolic Panel  -     Lipid Panel    3. Hypothyroidism (acquired)  Assessment & Plan:  Pending lab results, if TSH is stable, continue current medication and to follow up in 6 months. Make sure to take medication on an empty stomach and with no other medications.       Orders:  -     TSH Rfx On Abnormal To Free T4  -     Discontinue: Synthroid 112 MCG tablet; Take 1 tablet by mouth Daily.  Dispense: 90 tablet; Refill: 1  -     Synthroid 112 MCG tablet; Take 1 tablet by mouth Daily.  Dispense: 90 tablet; Refill: 1    4. CKD stage 3 due to type 2 diabetes mellitus  Assessment & Plan:  Checking labs, keep upcoming follow up appt     Orders:  -      CBC w AUTO Differential; Future    5. Nausea  Assessment & Plan:  Refilled pheneragan to take prn     Orders:  -     promethazine (PHENERGAN) 25 MG tablet; Take 1/2 to 1 tablet by mouth Every 6 (Six) Hours As Needed for Nausea  Dispense: 30 tablet; Refill: 1    6. Type 2 diabetes mellitus with diabetic nephropathy, without long-term current use of insulin  Assessment & Plan:  Checking some labs, continue to monitor her sugars, check feet, keep upcoming endo appt, working on diet and regular exercise     Orders:  -     Comprehensive Metabolic Panel  -     Lipid Panel  -     Hemoglobin A1c; Future    7. Other insomnia  Assessment & Plan:  continue trazodone     Orders:  -     Discontinue: traZODone (DESYREL) 50 MG tablet; Take 1 tablet by mouth Every Night.  Dispense: 90 tablet; Refill: 1  -     traZODone (DESYREL) 50 MG tablet; Take 1 tablet by mouth Every Night.  Dispense: 90 tablet; Refill: 1    8. Other mixed anxiety disorders  Assessment & Plan:  Continue cymbalta and wellbutrin and use hydroxyzine prn     Orders:  -     Discontinue: buPROPion XL (WELLBUTRIN XL) 300 MG 24 hr tablet; Take 1 tablet by mouth Daily.  Dispense: 90 tablet; Refill: 1  -     Discontinue: DULoxetine (CYMBALTA) 60 MG capsule; Take 1 capsule by mouth 2 (Two) Times a Day.  Dispense: 180 capsule; Refill: 1  -     buPROPion XL (WELLBUTRIN XL) 300 MG 24 hr tablet; Take 1 tablet by mouth Daily.  Dispense: 90 tablet; Refill: 1  -     DULoxetine (CYMBALTA) 60 MG capsule; Take 1 capsule by mouth 2 (Two) Times a Day.  Dispense: 180 capsule; Refill: 1                  Follow Up     Return for followup pending lab results.    Patient was given instructions and counseling regarding her condition or for health maintenance advice. Please see specific information pulled into the AVS if appropriate.

## 2024-07-30 NOTE — ASSESSMENT & PLAN NOTE
Checking some labs, continue to monitor her sugars, check feet, keep upcoming endo appt, working on diet and regular exercise

## 2024-07-30 NOTE — ASSESSMENT & PLAN NOTE
Hypertension is stable. Continue to monitor BP at home. Continue current meds. Continue to modify diet and lifestyle.    Last refill 11-18-16

## 2024-08-05 DIAGNOSIS — E03.9 HYPOTHYROIDISM (ACQUIRED): Primary | ICD-10-CM

## 2024-08-08 DIAGNOSIS — E11.9 TYPE 2 DIABETES MELLITUS WITHOUT COMPLICATION, WITHOUT LONG-TERM CURRENT USE OF INSULIN: ICD-10-CM

## 2024-08-09 RX ORDER — BLOOD-GLUCOSE SENSOR
EACH MISCELLANEOUS
Qty: 6 EACH | Refills: 1 | Status: SHIPPED | OUTPATIENT
Start: 2024-08-09

## 2024-09-05 ENCOUNTER — TRANSCRIBE ORDERS (OUTPATIENT)
Dept: LAB | Facility: HOSPITAL | Age: 69
End: 2024-09-05
Payer: COMMERCIAL

## 2024-09-05 ENCOUNTER — LAB (OUTPATIENT)
Dept: LAB | Facility: HOSPITAL | Age: 69
End: 2024-09-05
Payer: COMMERCIAL

## 2024-09-05 DIAGNOSIS — E11.9 DIABETES MELLITUS WITHOUT COMPLICATION: ICD-10-CM

## 2024-09-05 DIAGNOSIS — E55.9 VITAMIN D DEFICIENCY: ICD-10-CM

## 2024-09-05 DIAGNOSIS — N18.30 STAGE 3 CHRONIC KIDNEY DISEASE, UNSPECIFIED WHETHER STAGE 3A OR 3B CKD: ICD-10-CM

## 2024-09-05 DIAGNOSIS — I10 HYPERTENSION, ESSENTIAL: ICD-10-CM

## 2024-09-05 DIAGNOSIS — E03.9 HYPOTHYROIDISM (ACQUIRED): ICD-10-CM

## 2024-09-05 DIAGNOSIS — N18.30 STAGE 3 CHRONIC KIDNEY DISEASE, UNSPECIFIED WHETHER STAGE 3A OR 3B CKD: Primary | ICD-10-CM

## 2024-09-05 DIAGNOSIS — E53.8 B12 DEFICIENCY: ICD-10-CM

## 2024-09-05 LAB
25(OH)D3 SERPL-MCNC: 47.5 NG/ML (ref 30–100)
ALBUMIN SERPL-MCNC: 3.6 G/DL (ref 3.5–5.2)
ALBUMIN UR-MCNC: <1.2 MG/DL
ALBUMIN/GLOB SERPL: 1.4 G/DL
ALP SERPL-CCNC: 126 U/L (ref 39–117)
ALT SERPL W P-5'-P-CCNC: 10 U/L (ref 1–33)
ANION GAP SERPL CALCULATED.3IONS-SCNC: 8 MMOL/L (ref 5–15)
AST SERPL-CCNC: 18 U/L (ref 1–32)
BACTERIA UR QL AUTO: ABNORMAL /HPF
BASOPHILS # BLD AUTO: 0.02 10*3/MM3 (ref 0–0.2)
BASOPHILS NFR BLD AUTO: 0.3 % (ref 0–1.5)
BILIRUB SERPL-MCNC: 0.3 MG/DL (ref 0–1.2)
BILIRUB UR QL STRIP: NEGATIVE
BUN SERPL-MCNC: 14 MG/DL (ref 8–23)
BUN/CREAT SERPL: 12.7 (ref 7–25)
CALCIUM SPEC-SCNC: 9.2 MG/DL (ref 8.6–10.5)
CHLORIDE SERPL-SCNC: 105 MMOL/L (ref 98–107)
CLARITY UR: CLEAR
CO2 SERPL-SCNC: 27 MMOL/L (ref 22–29)
COLOR UR: YELLOW
CREAT SERPL-MCNC: 1.1 MG/DL (ref 0.57–1)
CREAT UR-MCNC: 131.5 MG/DL
CREAT UR-MCNC: 151.3 MG/DL
DEPRECATED RDW RBC AUTO: 53.2 FL (ref 37–54)
EGFRCR SERPLBLD CKD-EPI 2021: 54.8 ML/MIN/1.73
EOSINOPHIL # BLD AUTO: 0.15 10*3/MM3 (ref 0–0.4)
EOSINOPHIL NFR BLD AUTO: 2 % (ref 0.3–6.2)
ERYTHROCYTE [DISTWIDTH] IN BLOOD BY AUTOMATED COUNT: 15.4 % (ref 12.3–15.4)
FOLATE SERPL-MCNC: 11.9 NG/ML (ref 4.78–24.2)
GLOBULIN UR ELPH-MCNC: 2.6 GM/DL
GLUCOSE SERPL-MCNC: 111 MG/DL (ref 65–99)
GLUCOSE UR STRIP-MCNC: NEGATIVE MG/DL
HCT VFR BLD AUTO: 35.2 % (ref 34–46.6)
HGB BLD-MCNC: 11.1 G/DL (ref 12–15.9)
HGB UR QL STRIP.AUTO: ABNORMAL
IMM GRANULOCYTES # BLD AUTO: 0.01 10*3/MM3 (ref 0–0.05)
IMM GRANULOCYTES NFR BLD AUTO: 0.1 % (ref 0–0.5)
KETONES UR QL STRIP: NEGATIVE
LEUKOCYTE ESTERASE UR QL STRIP.AUTO: NEGATIVE
LYMPHOCYTES # BLD AUTO: 2.75 10*3/MM3 (ref 0.7–3.1)
LYMPHOCYTES NFR BLD AUTO: 37.5 % (ref 19.6–45.3)
MCH RBC QN AUTO: 29.4 PG (ref 26.6–33)
MCHC RBC AUTO-ENTMCNC: 31.5 G/DL (ref 31.5–35.7)
MCV RBC AUTO: 93.1 FL (ref 79–97)
MICROALBUMIN/CREAT UR: NORMAL MG/G{CREAT}
MONOCYTES # BLD AUTO: 0.7 10*3/MM3 (ref 0.1–0.9)
MONOCYTES NFR BLD AUTO: 9.5 % (ref 5–12)
NEUTROPHILS NFR BLD AUTO: 3.7 10*3/MM3 (ref 1.7–7)
NEUTROPHILS NFR BLD AUTO: 50.6 % (ref 42.7–76)
NITRITE UR QL STRIP: NEGATIVE
PH UR STRIP.AUTO: 5.5 [PH] (ref 5–8)
PLATELET # BLD AUTO: 335 10*3/MM3 (ref 140–450)
PMV BLD AUTO: 10.1 FL (ref 6–12)
POTASSIUM SERPL-SCNC: 4.3 MMOL/L (ref 3.5–5.2)
PROT ?TM UR-MCNC: 18.9 MG/DL
PROT SERPL-MCNC: 6.2 G/DL (ref 6–8.5)
PROT UR QL STRIP: NEGATIVE
PROT/CREAT UR: 0.12 MG/G{CREAT}
RBC # BLD AUTO: 3.78 10*6/MM3 (ref 3.77–5.28)
RBC # UR STRIP: ABNORMAL /HPF
REF LAB TEST METHOD: ABNORMAL
SODIUM SERPL-SCNC: 140 MMOL/L (ref 136–145)
SP GR UR STRIP: >=1.03 (ref 1–1.03)
SQUAMOUS #/AREA URNS HPF: ABNORMAL /HPF
T4 FREE SERPL-MCNC: 1.41 NG/DL (ref 0.92–1.68)
TSH SERPL DL<=0.05 MIU/L-ACNC: 4.39 UIU/ML (ref 0.27–4.2)
UROBILINOGEN UR QL STRIP: ABNORMAL
VIT B12 BLD-MCNC: 250 PG/ML (ref 211–946)
WBC # UR STRIP: ABNORMAL /HPF
WBC NRBC COR # BLD AUTO: 7.33 10*3/MM3 (ref 3.4–10.8)

## 2024-09-05 PROCEDURE — 82607 VITAMIN B-12: CPT

## 2024-09-05 PROCEDURE — 81001 URINALYSIS AUTO W/SCOPE: CPT

## 2024-09-05 PROCEDURE — 82570 ASSAY OF URINE CREATININE: CPT

## 2024-09-05 PROCEDURE — 36415 COLL VENOUS BLD VENIPUNCTURE: CPT

## 2024-09-05 PROCEDURE — 80050 GENERAL HEALTH PANEL: CPT

## 2024-09-05 PROCEDURE — 84439 ASSAY OF FREE THYROXINE: CPT

## 2024-09-05 PROCEDURE — 82306 VITAMIN D 25 HYDROXY: CPT

## 2024-09-05 PROCEDURE — 82746 ASSAY OF FOLIC ACID SERUM: CPT

## 2024-09-05 PROCEDURE — 84156 ASSAY OF PROTEIN URINE: CPT

## 2024-09-05 PROCEDURE — 82043 UR ALBUMIN QUANTITATIVE: CPT

## 2024-09-18 DIAGNOSIS — E11.9 TYPE 2 DIABETES MELLITUS WITHOUT COMPLICATION, WITHOUT LONG-TERM CURRENT USE OF INSULIN: ICD-10-CM

## 2024-10-19 DIAGNOSIS — I10 ESSENTIAL HYPERTENSION: ICD-10-CM

## 2024-10-21 RX ORDER — ATENOLOL 25 MG/1
25 TABLET ORAL DAILY
Qty: 90 TABLET | Refills: 0 | Status: SHIPPED | OUTPATIENT
Start: 2024-10-21

## 2024-11-05 ENCOUNTER — TRANSCRIBE ORDERS (OUTPATIENT)
Dept: ADMINISTRATIVE | Facility: HOSPITAL | Age: 69
End: 2024-11-05
Payer: COMMERCIAL

## 2024-11-05 DIAGNOSIS — Z12.31 SCREENING MAMMOGRAM, ENCOUNTER FOR: Primary | ICD-10-CM

## 2024-12-09 ENCOUNTER — TRANSCRIBE ORDERS (OUTPATIENT)
Dept: ADMINISTRATIVE | Facility: HOSPITAL | Age: 69
End: 2024-12-09
Payer: MEDICARE

## 2024-12-09 ENCOUNTER — LAB (OUTPATIENT)
Dept: LAB | Facility: HOSPITAL | Age: 69
End: 2024-12-09
Payer: MEDICARE

## 2024-12-09 ENCOUNTER — HOSPITAL ENCOUNTER (OUTPATIENT)
Dept: MAMMOGRAPHY | Facility: HOSPITAL | Age: 69
Discharge: HOME | End: 2024-12-09
Payer: MEDICARE

## 2024-12-09 DIAGNOSIS — I10 HYPERTENSION, ESSENTIAL: ICD-10-CM

## 2024-12-09 DIAGNOSIS — N18.30 STAGE 3 CHRONIC KIDNEY DISEASE, UNSPECIFIED WHETHER STAGE 3A OR 3B CKD: ICD-10-CM

## 2024-12-09 DIAGNOSIS — Z12.31 SCREENING MAMMOGRAM, ENCOUNTER FOR: ICD-10-CM

## 2024-12-09 DIAGNOSIS — D52.9 ANEMIA DUE TO FOLIC ACID DEFICIENCY, UNSPECIFIED DEFICIENCY TYPE: Primary | ICD-10-CM

## 2024-12-09 DIAGNOSIS — D52.9 ANEMIA DUE TO FOLIC ACID DEFICIENCY, UNSPECIFIED DEFICIENCY TYPE: ICD-10-CM

## 2024-12-09 LAB
ALBUMIN SERPL-MCNC: 3.6 G/DL (ref 3.5–5.2)
ALBUMIN/GLOB SERPL: 1.2 G/DL
ALP SERPL-CCNC: 121 U/L (ref 39–117)
ALT SERPL W P-5'-P-CCNC: 19 U/L (ref 1–33)
ANION GAP SERPL CALCULATED.3IONS-SCNC: 9.1 MMOL/L (ref 5–15)
AST SERPL-CCNC: 22 U/L (ref 1–32)
BASOPHILS # BLD AUTO: 0.03 10*3/MM3 (ref 0–0.2)
BASOPHILS NFR BLD AUTO: 0.5 % (ref 0–1.5)
BILIRUB SERPL-MCNC: 0.3 MG/DL (ref 0–1.2)
BUN SERPL-MCNC: 15 MG/DL (ref 8–23)
BUN/CREAT SERPL: 12.5 (ref 7–25)
CALCIUM SPEC-SCNC: 9.4 MG/DL (ref 8.6–10.5)
CHLORIDE SERPL-SCNC: 103 MMOL/L (ref 98–107)
CO2 SERPL-SCNC: 26.9 MMOL/L (ref 22–29)
CREAT SERPL-MCNC: 1.2 MG/DL (ref 0.57–1)
DEPRECATED RDW RBC AUTO: 49.5 FL (ref 37–54)
EGFRCR SERPLBLD CKD-EPI 2021: 49.4 ML/MIN/1.73
EOSINOPHIL # BLD AUTO: 0.2 10*3/MM3 (ref 0–0.4)
EOSINOPHIL NFR BLD AUTO: 3.1 % (ref 0.3–6.2)
ERYTHROCYTE [DISTWIDTH] IN BLOOD BY AUTOMATED COUNT: 14.5 % (ref 12.3–15.4)
FOLATE SERPL-MCNC: 9.86 NG/ML (ref 4.78–24.2)
GLOBULIN UR ELPH-MCNC: 2.9 GM/DL
GLUCOSE SERPL-MCNC: 105 MG/DL (ref 65–99)
HCT VFR BLD AUTO: 37.3 % (ref 34–46.6)
HGB BLD-MCNC: 12 G/DL (ref 12–15.9)
IMM GRANULOCYTES # BLD AUTO: 0 10*3/MM3 (ref 0–0.05)
IMM GRANULOCYTES NFR BLD AUTO: 0 % (ref 0–0.5)
IRON 24H UR-MRATE: 67 MCG/DL (ref 37–145)
IRON SATN MFR SERPL: 17 % (ref 20–50)
LYMPHOCYTES # BLD AUTO: 3.27 10*3/MM3 (ref 0.7–3.1)
LYMPHOCYTES NFR BLD AUTO: 51.3 % (ref 19.6–45.3)
MCH RBC QN AUTO: 29.3 PG (ref 26.6–33)
MCHC RBC AUTO-ENTMCNC: 32.2 G/DL (ref 31.5–35.7)
MCV RBC AUTO: 91 FL (ref 79–97)
MONOCYTES # BLD AUTO: 0.51 10*3/MM3 (ref 0.1–0.9)
MONOCYTES NFR BLD AUTO: 8 % (ref 5–12)
NEUTROPHILS NFR BLD AUTO: 2.37 10*3/MM3 (ref 1.7–7)
NEUTROPHILS NFR BLD AUTO: 37.1 % (ref 42.7–76)
PLATELET # BLD AUTO: 262 10*3/MM3 (ref 140–450)
PMV BLD AUTO: 9.1 FL (ref 6–12)
POTASSIUM SERPL-SCNC: 4.1 MMOL/L (ref 3.5–5.2)
PROT SERPL-MCNC: 6.5 G/DL (ref 6–8.5)
RBC # BLD AUTO: 4.1 10*6/MM3 (ref 3.77–5.28)
SODIUM SERPL-SCNC: 139 MMOL/L (ref 136–145)
TIBC SERPL-MCNC: 405 MCG/DL (ref 298–536)
TRANSFERRIN SERPL-MCNC: 272 MG/DL (ref 200–360)
VIT B12 BLD-MCNC: 1284 PG/ML (ref 211–946)
WBC NRBC COR # BLD AUTO: 6.38 10*3/MM3 (ref 3.4–10.8)

## 2024-12-09 PROCEDURE — 84466 ASSAY OF TRANSFERRIN: CPT

## 2024-12-09 PROCEDURE — 77067 SCR MAMMO BI INCL CAD: CPT

## 2024-12-09 PROCEDURE — 82607 VITAMIN B-12: CPT

## 2024-12-09 PROCEDURE — 77063 BREAST TOMOSYNTHESIS BI: CPT

## 2024-12-09 PROCEDURE — 83540 ASSAY OF IRON: CPT

## 2024-12-09 PROCEDURE — 82746 ASSAY OF FOLIC ACID SERUM: CPT

## 2024-12-09 PROCEDURE — 85025 COMPLETE CBC W/AUTO DIFF WBC: CPT

## 2024-12-09 PROCEDURE — 36415 COLL VENOUS BLD VENIPUNCTURE: CPT

## 2024-12-09 PROCEDURE — 80053 COMPREHEN METABOLIC PANEL: CPT

## 2024-12-14 DIAGNOSIS — E78.2 MIXED HYPERLIPIDEMIA: ICD-10-CM

## 2024-12-16 RX ORDER — ROSUVASTATIN CALCIUM 5 MG/1
5 TABLET, COATED ORAL DAILY
Qty: 90 TABLET | Refills: 1 | Status: SHIPPED | OUTPATIENT
Start: 2024-12-16

## 2025-01-08 DIAGNOSIS — E11.9 TYPE 2 DIABETES MELLITUS WITHOUT COMPLICATION, WITHOUT LONG-TERM CURRENT USE OF INSULIN: ICD-10-CM

## 2025-01-08 RX ORDER — ACYCLOVIR 800 MG/1
TABLET ORAL
Qty: 4 EACH | Refills: 1 | Status: SHIPPED | OUTPATIENT
Start: 2025-01-08

## 2025-02-04 ENCOUNTER — LAB (OUTPATIENT)
Dept: LAB | Facility: HOSPITAL | Age: 70
End: 2025-02-04
Payer: MEDICARE

## 2025-02-04 ENCOUNTER — OFFICE VISIT (OUTPATIENT)
Dept: FAMILY MEDICINE CLINIC | Age: 70
End: 2025-02-04
Payer: MEDICARE

## 2025-02-04 VITALS
DIASTOLIC BLOOD PRESSURE: 71 MMHG | TEMPERATURE: 98.1 F | WEIGHT: 165.4 LBS | HEART RATE: 70 BPM | BODY MASS INDEX: 28.24 KG/M2 | OXYGEN SATURATION: 98 % | SYSTOLIC BLOOD PRESSURE: 104 MMHG | HEIGHT: 64 IN

## 2025-02-04 DIAGNOSIS — E78.2 MIXED HYPERLIPIDEMIA: ICD-10-CM

## 2025-02-04 DIAGNOSIS — J01.00 ACUTE MAXILLARY SINUSITIS, RECURRENCE NOT SPECIFIED: ICD-10-CM

## 2025-02-04 DIAGNOSIS — Z78.0 POSTMENOPAUSAL: ICD-10-CM

## 2025-02-04 DIAGNOSIS — E11.21 TYPE 2 DIABETES MELLITUS WITH DIABETIC NEPHROPATHY, WITHOUT LONG-TERM CURRENT USE OF INSULIN: ICD-10-CM

## 2025-02-04 DIAGNOSIS — N18.30 CKD STAGE 3 DUE TO TYPE 2 DIABETES MELLITUS: ICD-10-CM

## 2025-02-04 DIAGNOSIS — I10 ESSENTIAL HYPERTENSION: ICD-10-CM

## 2025-02-04 DIAGNOSIS — F41.3 OTHER MIXED ANXIETY DISORDERS: ICD-10-CM

## 2025-02-04 DIAGNOSIS — G47.09 OTHER INSOMNIA: ICD-10-CM

## 2025-02-04 DIAGNOSIS — E03.9 HYPOTHYROIDISM (ACQUIRED): ICD-10-CM

## 2025-02-04 DIAGNOSIS — E11.22 CKD STAGE 3 DUE TO TYPE 2 DIABETES MELLITUS: ICD-10-CM

## 2025-02-04 DIAGNOSIS — Z00.00 ROUTINE GENERAL MEDICAL EXAMINATION AT A HEALTH CARE FACILITY: ICD-10-CM

## 2025-02-04 DIAGNOSIS — R11.0 NAUSEA: ICD-10-CM

## 2025-02-04 DIAGNOSIS — I10 ESSENTIAL HYPERTENSION: Primary | ICD-10-CM

## 2025-02-04 PROBLEM — R10.9 FLANK PAIN: Status: RESOLVED | Noted: 2022-04-04 | Resolved: 2025-02-04

## 2025-02-04 PROBLEM — R21 RASH: Status: RESOLVED | Noted: 2023-08-07 | Resolved: 2025-02-04

## 2025-02-04 PROBLEM — M53.3 PAIN IN THE COCCYX: Status: RESOLVED | Noted: 2023-02-20 | Resolved: 2025-02-04

## 2025-02-04 PROBLEM — R55 SYNCOPE AND COLLAPSE: Status: RESOLVED | Noted: 2023-02-20 | Resolved: 2025-02-04

## 2025-02-04 PROBLEM — R30.0 DYSURIA: Status: RESOLVED | Noted: 2023-01-10 | Resolved: 2025-02-04

## 2025-02-04 PROBLEM — R10.11 RUQ ABDOMINAL PAIN: Status: RESOLVED | Noted: 2022-04-04 | Resolved: 2025-02-04

## 2025-02-04 LAB
ALBUMIN SERPL-MCNC: 3.9 G/DL (ref 3.5–5.2)
ALBUMIN/GLOB SERPL: 1.3 G/DL
ALP SERPL-CCNC: 123 U/L (ref 39–117)
ALT SERPL W P-5'-P-CCNC: 12 U/L (ref 1–33)
ANION GAP SERPL CALCULATED.3IONS-SCNC: 11.5 MMOL/L (ref 5–15)
AST SERPL-CCNC: 18 U/L (ref 1–32)
BASOPHILS # BLD AUTO: 0.05 10*3/MM3 (ref 0–0.2)
BASOPHILS NFR BLD AUTO: 0.5 % (ref 0–1.5)
BILIRUB SERPL-MCNC: 0.4 MG/DL (ref 0–1.2)
BUN SERPL-MCNC: 17 MG/DL (ref 8–23)
BUN/CREAT SERPL: 14.5 (ref 7–25)
CALCIUM SPEC-SCNC: 9.5 MG/DL (ref 8.6–10.5)
CHLORIDE SERPL-SCNC: 103 MMOL/L (ref 98–107)
CHOLEST SERPL-MCNC: 144 MG/DL (ref 0–200)
CO2 SERPL-SCNC: 25.5 MMOL/L (ref 22–29)
CREAT SERPL-MCNC: 1.17 MG/DL (ref 0.57–1)
DEPRECATED RDW RBC AUTO: 49.4 FL (ref 37–54)
EGFRCR SERPLBLD CKD-EPI 2021: 50.6 ML/MIN/1.73
EOSINOPHIL # BLD AUTO: 0.51 10*3/MM3 (ref 0–0.4)
EOSINOPHIL NFR BLD AUTO: 5.6 % (ref 0.3–6.2)
ERYTHROCYTE [DISTWIDTH] IN BLOOD BY AUTOMATED COUNT: 14.9 % (ref 12.3–15.4)
GLOBULIN UR ELPH-MCNC: 2.9 GM/DL
GLUCOSE SERPL-MCNC: 100 MG/DL (ref 65–99)
HBA1C MFR BLD: 5.9 % (ref 4.8–5.6)
HCT VFR BLD AUTO: 37.3 % (ref 34–46.6)
HDLC SERPL-MCNC: 62 MG/DL (ref 40–60)
HGB BLD-MCNC: 12 G/DL (ref 12–15.9)
IMM GRANULOCYTES # BLD AUTO: 0 10*3/MM3 (ref 0–0.05)
IMM GRANULOCYTES NFR BLD AUTO: 0 % (ref 0–0.5)
LDLC SERPL CALC-MCNC: 70 MG/DL (ref 0–100)
LDLC/HDLC SERPL: 1.13 {RATIO}
LYMPHOCYTES # BLD AUTO: 3.62 10*3/MM3 (ref 0.7–3.1)
LYMPHOCYTES NFR BLD AUTO: 39.5 % (ref 19.6–45.3)
MCH RBC QN AUTO: 28.8 PG (ref 26.6–33)
MCHC RBC AUTO-ENTMCNC: 32.2 G/DL (ref 31.5–35.7)
MCV RBC AUTO: 89.4 FL (ref 79–97)
MONOCYTES # BLD AUTO: 0.62 10*3/MM3 (ref 0.1–0.9)
MONOCYTES NFR BLD AUTO: 6.8 % (ref 5–12)
NEUTROPHILS NFR BLD AUTO: 4.37 10*3/MM3 (ref 1.7–7)
NEUTROPHILS NFR BLD AUTO: 47.6 % (ref 42.7–76)
PLATELET # BLD AUTO: 348 10*3/MM3 (ref 140–450)
PMV BLD AUTO: 9.5 FL (ref 6–12)
POTASSIUM SERPL-SCNC: 4.5 MMOL/L (ref 3.5–5.2)
PROT SERPL-MCNC: 6.8 G/DL (ref 6–8.5)
RBC # BLD AUTO: 4.17 10*6/MM3 (ref 3.77–5.28)
SODIUM SERPL-SCNC: 140 MMOL/L (ref 136–145)
TRIGL SERPL-MCNC: 59 MG/DL (ref 0–150)
TSH SERPL DL<=0.05 MIU/L-ACNC: 2.41 UIU/ML (ref 0.27–4.2)
VLDLC SERPL-MCNC: 12 MG/DL (ref 5–40)
WBC NRBC COR # BLD AUTO: 9.17 10*3/MM3 (ref 3.4–10.8)

## 2025-02-04 PROCEDURE — 80053 COMPREHEN METABOLIC PANEL: CPT

## 2025-02-04 PROCEDURE — 3074F SYST BP LT 130 MM HG: CPT | Performed by: NURSE PRACTITIONER

## 2025-02-04 PROCEDURE — G0439 PPPS, SUBSEQ VISIT: HCPCS | Performed by: NURSE PRACTITIONER

## 2025-02-04 PROCEDURE — 83036 HEMOGLOBIN GLYCOSYLATED A1C: CPT

## 2025-02-04 PROCEDURE — 36415 COLL VENOUS BLD VENIPUNCTURE: CPT

## 2025-02-04 PROCEDURE — 85025 COMPLETE CBC W/AUTO DIFF WBC: CPT

## 2025-02-04 PROCEDURE — 1126F AMNT PAIN NOTED NONE PRSNT: CPT | Performed by: NURSE PRACTITIONER

## 2025-02-04 PROCEDURE — 3078F DIAST BP <80 MM HG: CPT | Performed by: NURSE PRACTITIONER

## 2025-02-04 PROCEDURE — 1170F FXNL STATUS ASSESSED: CPT | Performed by: NURSE PRACTITIONER

## 2025-02-04 PROCEDURE — 80061 LIPID PANEL: CPT

## 2025-02-04 PROCEDURE — 84443 ASSAY THYROID STIM HORMONE: CPT | Performed by: NURSE PRACTITIONER

## 2025-02-04 RX ORDER — DULOXETIN HYDROCHLORIDE 60 MG/1
60 CAPSULE, DELAYED RELEASE ORAL DAILY
Qty: 90 CAPSULE | Refills: 1 | Status: SHIPPED | OUTPATIENT
Start: 2025-02-04

## 2025-02-04 RX ORDER — BUPROPION HYDROCHLORIDE 300 MG/1
300 TABLET ORAL DAILY
Qty: 90 TABLET | Refills: 1 | Status: SHIPPED | OUTPATIENT
Start: 2025-02-04

## 2025-02-04 RX ORDER — TRAZODONE HYDROCHLORIDE 50 MG/1
50 TABLET, FILM COATED ORAL NIGHTLY
Qty: 90 TABLET | Refills: 1 | Status: SHIPPED | OUTPATIENT
Start: 2025-02-04

## 2025-02-04 RX ORDER — ATENOLOL 25 MG/1
25 TABLET ORAL DAILY
Qty: 90 TABLET | Refills: 1 | Status: SHIPPED | OUTPATIENT
Start: 2025-02-04

## 2025-02-04 RX ORDER — DULOXETIN HYDROCHLORIDE 60 MG/1
60 CAPSULE, DELAYED RELEASE ORAL DAILY
COMMUNITY
End: 2025-02-04 | Stop reason: SDUPTHER

## 2025-02-04 RX ORDER — LEVOTHYROXINE SODIUM 112 MCG
112 TABLET ORAL DAILY
Qty: 90 TABLET | Refills: 1 | Status: SHIPPED | OUTPATIENT
Start: 2025-02-04

## 2025-02-04 RX ORDER — ROSUVASTATIN CALCIUM 5 MG/1
5 TABLET, COATED ORAL DAILY
Qty: 90 TABLET | Refills: 1 | Status: SHIPPED | OUTPATIENT
Start: 2025-02-04

## 2025-02-04 RX ORDER — FERROUS SULFATE 325(65) MG
325 TABLET ORAL
COMMUNITY

## 2025-02-04 RX ORDER — LISINOPRIL 5 MG/1
5 TABLET ORAL DAILY
COMMUNITY

## 2025-02-04 RX ORDER — PROMETHAZINE HYDROCHLORIDE 25 MG/1
25 TABLET ORAL EVERY 6 HOURS PRN
Qty: 30 TABLET | Refills: 1 | Status: SHIPPED | OUTPATIENT
Start: 2025-02-04

## 2025-02-04 RX ORDER — CEFDINIR 300 MG/1
300 CAPSULE ORAL 2 TIMES DAILY
Qty: 20 CAPSULE | Refills: 0 | Status: SHIPPED | OUTPATIENT
Start: 2025-02-04

## 2025-02-04 RX ORDER — FUROSEMIDE 20 MG/1
20 TABLET ORAL AS NEEDED
COMMUNITY
Start: 2024-09-12

## 2025-02-04 NOTE — ASSESSMENT & PLAN NOTE
She will get labs today, will follow up for labs in a month for neph, she will continue her current rx's and monitoring her BP

## 2025-02-04 NOTE — ASSESSMENT & PLAN NOTE
Rest, increase fluids, follow up if symptoms progress or change   Try sallie med sinus rinse, continue mucinex

## 2025-02-04 NOTE — ASSESSMENT & PLAN NOTE
Checking labs, To work on diet, regular exercise, monitor sugars, check feet daily, see optometrist yearly or as directed.

## 2025-02-04 NOTE — PROGRESS NOTES
Subjective   The ABCs of the Annual Wellness Visit  Medicare Wellness Visit      Guillermina Gardner is a 70 y.o. patient who presents for a Medicare Wellness Visit.  And follow up DM, HTN, HLT, thyroid disorder and anxiety    Medicare wellness HPI  Exercises regularly: yes 3 days a week   Eats healthy:yes  Last mammogram:12-9-24 neg  Last DEXA:7-18-22 osteopenia   Last pap smear:5-4-2017  BSE:yes   Wears seatbelts:yes   Living will:no, booklet given   Optometrist:Dr Robbins  Dentist:Dr Lilo Gongora   Tobacco:none   Alcohol intake:rarely   Drugs:none   Falls:none, did have syncope in 2023 and a fall, BP rx's were changed by neph  Colonoscopy: 2016 normal and told no need to have repeat CLN  Immunizations:UTD on covid, flu, RSV, Tdap, shingrex and pneumonia 20    The following portions of the patient's history were reviewed and   updated as appropriate: allergies, current medications, past family history, past medical history, past social history, past surgical history, and problem list.    Compared to one year ago, the patient's physical   health is better.  Compared to one year ago, the patient's mental   health is better.    Recent Hospitalizations:none   She was not  admitted to a hospital within the past 365 days     Current Medical Providers:  Patient Care Team:  Cecile Dennis APRN as PCP - General (Family Medicine)    Outpatient Medications Prior to Visit   Medication Sig Dispense Refill    cholecalciferol (VITAMIN D3) 25 MCG (1000 UT) tablet Take 1 tablet by mouth Daily.      coenzyme Q10 100 MG capsule Take 1 tablet by mouth Daily.      Continuous Glucose Sensor (FreeStyle Fredrick 3 Sensor) misc APPLY SENSOR TO SKIN FOR CONTINUOUS BLOOD SUGAR MONITORING, REPLACE EVERY 14 DAYS 4 each 1    cyanocobalamin (VITAMIN B-12) 500 MCG tablet Take 1 tablet by mouth Every Other Day.      ferrous sulfate 325 (65 FE) MG tablet Take 1 tablet by mouth Daily With Breakfast.      furosemide (LASIX) 20 MG tablet Take 1  tablet by mouth As Needed.      hydrOXYzine (ATARAX) 25 MG tablet Take 1 tablet by mouth 3 (Three) Times a Day As Needed for Anxiety. 60 tablet 1    lisinopril (PRINIVIL,ZESTRIL) 5 MG tablet Take 1 tablet by mouth Daily.      metFORMIN (GLUCOPHAGE) 500 MG tablet TAKE 2 TABLETS BY MOUTH TWICE A  tablet 1    Multiple Vitamins-Minerals (EYE VITAMINS PO) Take 1 tablet by mouth Daily.      Premarin 0.625 MG/GM vaginal cream INSERT 1/2 GRAMS VAGINALLY DAILY FOR 3 WEEKS AND THEN OFF FOR 1 WEEK AND REPEAT 30 g 2    SUMAtriptan (IMITREX) 50 MG tablet TAKE ONE TABLET BY MOUTH AT ONSET OF HEADACHE; MAY REPEAT ONE TABLET IN 2 HOURS IF NEEDED. *MAX 4 TABLETS IN 24 HOURS* 9 tablet 2    Tirzepatide (MOUNJARO) 5 MG/0.5ML solution pen-injector pen Inject 0.5 mL under the skin into the appropriate area as directed Every 7 (Seven) Days. (Patient taking differently: Inject 7.5 mg under the skin into the appropriate area as directed Every 7 (Seven) Days.)      triamcinolone (KENALOG) 0.1 % cream APPLY TO THE AFFECTED AREA(S) 2 TIMES A DAY AS DIRECTED 45 g 0    atenolol (TENORMIN) 25 MG tablet TAKE 1 TABLET BY MOUTH DAILY 90 tablet 0    buPROPion XL (WELLBUTRIN XL) 300 MG 24 hr tablet Take 1 tablet by mouth Daily. 90 tablet 1    DULoxetine (CYMBALTA) 60 MG capsule Take 1 capsule by mouth Daily.      lisinopril (PRINIVIL,ZESTRIL) 10 MG tablet Take 1 tablet by mouth Daily. (Patient taking differently: Take 0.5 tablets by mouth Daily.) 90 tablet 1    promethazine (PHENERGAN) 25 MG tablet Take 1/2 to 1 tablet by mouth Every 6 (Six) Hours As Needed for Nausea 30 tablet 1    rosuvastatin (CRESTOR) 5 MG tablet TAKE 1 TABLET BY MOUTH DAILY 90 tablet 1    silver sulfadiazine (Silvadene) 1 % cream Apply 1 application  topically to the appropriate area as directed 2 (Two) Times a Day. (Patient taking differently: Apply 1 Application topically to the appropriate area as directed As Needed.) 50 g 0    Synthroid 112 MCG tablet Take 1 tablet by  mouth Daily. 90 tablet 1    traZODone (DESYREL) 50 MG tablet Take 1 tablet by mouth Every Night. 90 tablet 1    DULoxetine (CYMBALTA) 60 MG capsule Take 1 capsule by mouth 2 (Two) Times a Day. (Patient not taking: Reported on 2/4/2025) 180 capsule 1    hydroCHLOROthiazide (HYDRODIURIL) 25 MG tablet TAKE ONE TABLET BY MOUTH DAILY (Patient not taking: Reported on 2/4/2025) 90 tablet 1    IRON, FERROUS GLUCONATE, PO Take  by mouth. (Patient not taking: Reported on 2/4/2025)      Xiidra 5 % ophthalmic solution Apply 1 drop to eye(s) as directed by provider 2 (Two) Times a Day. (Patient not taking: Reported on 2/4/2025)       No facility-administered medications prior to visit.     No opioid medication identified on active medication list. I have reviewed chart for other potential  high risk medication/s and harmful drug interactions in the elderly.      Aspirin is not on active medication list.  Aspirin use is not indicated based on review of current medical condition/s. Risk of harm outweighs potential benefits.  .    Patient Active Problem List   Diagnosis    Low back pain    Hip pain, chronic, left    Type 2 diabetes mellitus with renal complication    Hypothyroidism (acquired)    Essential hypertension    Anxiety disorder, unspecified    Mixed hyperlipidemia    Acute maxillary sinusitis    Routine general medical examination at a health care facility    Postmenopausal    Nausea    Immunization due    History of sebaceous cyst    Closed comedone    Low back pain radiating to left lower extremity    Other insomnia    CKD stage 3 due to type 2 diabetes mellitus     Advance Care Planning Advance Directive is not on file.  ACP discussion was held with the patient during this visit. Patient does not have an advance directive, information provided.            Objective   Vitals:    02/04/25 0829   BP: 104/71   BP Location: Right arm   Patient Position: Sitting   Cuff Size: Adult   Pulse: 70   Temp: 98.1 °F (36.7 °C)  "  TempSrc: Oral   SpO2: 98%   Weight: 75 kg (165 lb 6.4 oz)   Height: 163.2 cm (64.25\")   PainSc: 0-No pain       Estimated body mass index is 28.17 kg/m² as calculated from the following:    Height as of this encounter: 163.2 cm (64.25\").    Weight as of this encounter: 75 kg (165 lb 6.4 oz).    BMI is >= 25 and <30. (Overweight) The following options were offered after discussion;: exercise counseling/recommendations and nutrition counseling/recommendations         Does the patient have evidence of cognitive impairment? No                                                                                                Health  Risk Assessment    Smoking Status:  Social History     Tobacco Use   Smoking Status Former    Current packs/day: 0.00    Types: Cigarettes    Quit date:     Years since quittin.1    Passive exposure: Past   Smokeless Tobacco Never     Alcohol Consumption:  Social History     Substance and Sexual Activity   Alcohol Use Defer       Fall Risk Screen  STEADI Fall Risk Assessment was completed, and patient is at HIGH risk for falls. Assessment completed on:2025    Depression Screening   Little interest or pleasure in doing things? Not at all   Feeling down, depressed, or hopeless? Not at all   PHQ-2 Total Score 0      Health Habits and Functional and Cognitive Screenin/4/2025     8:27 AM   Functional & Cognitive Status   Do you have difficulty preparing food and eating? No   Do you have difficulty bathing yourself, getting dressed or grooming yourself? No   Do you have difficulty using the toilet? No   Do you have difficulty moving around from place to place? No   Do you have trouble with steps or getting out of a bed or a chair? No   Current Diet Well Balanced Diet   Dental Exam Up to date   Eye Exam Up to date   Exercise (times per week) 3 times per week   Current Exercises Include Stationary Bicycling/Spin Class;Walking   Do you need help using the phone?  No   Are you deaf " or do you have serious difficulty hearing?  No   Do you need help to go to places out of walking distance? No   Do you need help shopping? No   Do you need help preparing meals?  No   Do you need help with housework?  No   Do you need help with laundry? No   Do you need help taking your medications? No   Do you need help managing money? No   Do you ever drive or ride in a car without wearing a seat belt? No   Have you felt unusual stress, anger or loneliness in the last month? No   Who do you live with? Spouse   If you need help, do you have trouble finding someone available to you? No   Have you been bothered in the last four weeks by sexual problems? No   Do you have difficulty concentrating, remembering or making decisions? No           Age-appropriate Screening Schedule:  Refer to the list below for future screening recommendations based on patient's age, sex and/or medical conditions. Orders for these recommended tests are listed in the plan section. The patient has been provided with a written plan.    Health Maintenance List  Health Maintenance   Topic Date Due    DXA SCAN  07/18/2024    BMI FOLLOWUP  08/29/2024    HEMOGLOBIN A1C  04/07/2025    DIABETIC EYE EXAM  06/10/2025    LIPID PANEL  07/30/2025    ANNUAL WELLNESS VISIT  02/04/2026    DIABETIC FOOT EXAM  02/04/2026    COLORECTAL CANCER SCREENING  06/09/2026    MAMMOGRAM  12/09/2026    TDAP/TD VACCINES (2 - Td or Tdap) 05/11/2028    HEPATITIS C SCREENING  Completed    COVID-19 Vaccine  Completed    INFLUENZA VACCINE  Completed    Pneumococcal Vaccine 65+  Completed    ZOSTER VACCINE  Completed    URINE MICROALBUMIN  Discontinued                                                                                                                                                Gait and Balance Evaluation: Normal  CMS Preventative Services Quick Reference  Risk Factors Identified During Encounter  Immunizations Discussed/Encouraged:  UTD    The above  risks/problems have been discussed with the patient.  Pertinent information has been shared with the patient in the After Visit Summary.  An After Visit Summary and PPPS were made available to the patient.    Follow Up:   Next Medicare Wellness visit to be scheduled in 1 year.          Additional E&M Note during same encounter follows:  Patient has multiple medical problems which are significant and separately identifiable that require additional work above and beyond the Medicare Wellness Visit.      Chief Complaint  Medicare Wellness-subsequent ([facility])    Guillermina Gardner is a 70 y.o. female who presents to [facility]     Diabetes:  Sees endo  Current medication: metformin 500 BID and monjaro 7.5 weekly  Tolerating medication: Yes  Last eye exam: fall 2024 and will be seeing Dr Robbins 3-2025  Last foot exam: 1-30-24  At home BS ranges: <120's/ endo refills   Lab Results       Component                Value               Date                       HGBA1C                   6.30 (H)            07/30/2024                Hypertension:  Current medication: lisinopril 5 mg and atenolol   Tolerating Medication: Yes  Checking BP at home and it is: 110 / 70  Needs refills: yes needs atenolol to [facility]   Labs:  Lab Results       Component                Value               Date                       GLUCOSE                  105 (H)             12/09/2024                 BUN                      15                  12/09/2024                 CREATININE               1.20 (H)            12/09/2024                 EGFRIFNONA               62                  01/03/2022                 BCR                      12.5                12/09/2024                 K                        4.1                 12/09/2024                 CO2                      26.9                12/09/2024                 CALCIUM                  9.4                 12/09/2024                 ALBUMIN                   3.6                 2024                 LABIL2                   1.4                 2021                 AST                      22                  2024                 ALT                      19                  2024              Hyperlipidemia  Current medication: crestor   Tolerating medication: Yes  Needs Refill: Yes to [facility]     Lab Results       Component                Value               Date                       CHOL                     150                 2024                 CHLPL                    148                 2021                 TRIG                     81                  2024                 HDL                      61 (H)              2024                 LDL                      74                  2024              Hypothyroidism  Current rx: synthroid 112  Tolerating rx: yes, takes on empty stomach   Refills needed Yes to [facility]   Lab Results       Component                Value               Date                       TSH                      4.390 (H)           2024              Phenergan refills needed    Anxiety/ Depression/ Insomnia  Current medication: wellbutrin, cymbalta 60 mg QD, trazodone nightly, hydroxyzine prn  Tolerating medication: Yes  Current symptoms: anxiety controlled, does not need hydroxyzine, sleeps fair   Refills needed: yes to [facility]     Sees neph 3-2025    PAST MEDICAL HISTORY changes since 2024:         covid + 2- &     Dizziness - Seen Dr. Almaguer         GYNECOLOGICAL HISTORY:             PREVENTIVE HEALTH MAINTENANCE             Hepatitis C Medicare Screening: was last done 3-17-18; negative           Surgical History:         : X 2;     Lap Band ; Procedures: Ablation     Positive for    thumb joint left replaced/repaired 8-6-15; and    right thumb replaced 18;;     COLONOSCOPY: was last done 2016 with normal results Dr Rosa Gregorio  "History:        Father:  at age 89;  Type 2 Diabetes; Hypothyroidism;  Macular degeneration, colon polyps    Mother:  at age 90;  Hypertension;  Dementia     Sister(s): Hypertension; Endocrine Hypothyroidism, colon polyps  Maternal aunt with colon cancer          Social History:        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    Wt Readings from Last 3 Encounters:   25 75 kg (165 lb 6.4 oz)   24 77.7 kg (171 lb 3.2 oz)   24 76.9 kg (169 lb 9.6 oz)     BP Readings from Last 3 Encounters:   25 104/71   24 96/43   24 106/71       Review of Systems   Constitutional:  Negative for activity change, appetite change, chills, fatigue and fever.   HENT:  Positive for congestion (nasal for a month). Negative for hearing loss.    Eyes:  Negative for visual disturbance.   Respiratory:  Negative for cough, shortness of breath and wheezing.    Cardiovascular:  Negative for chest pain, palpitations and leg swelling.   Gastrointestinal:  Positive for nausea (occ, needs phenergan refilled to take prn). Negative for abdominal pain, constipation, diarrhea and vomiting.   Musculoskeletal:  Negative for arthralgias and myalgias.   Skin:  Negative for rash.   Neurological:  Negative for dizziness, weakness and numbness.   Psychiatric/Behavioral:  Positive for sleep disturbance (takes trazodone, helps some). Negative for confusion and suicidal ideas.         Physical Exam  Vitals reviewed.   Constitutional:       Appearance: Normal appearance. She is well-developed.   HENT:      " Head: Normocephalic and atraumatic.      Right Ear: External ear normal.      Left Ear: External ear normal.      Nose: Congestion (bilateral mild max tenderness) present.      Mouth/Throat:      Pharynx: No oropharyngeal exudate.   Eyes:      Conjunctiva/sclera: Conjunctivae normal.      Pupils: Pupils are equal, round, and reactive to light.   Neck:      Vascular: No carotid bruit.   Cardiovascular:      Rate and Rhythm: Normal rate and regular rhythm.      Pulses:           Posterior tibial pulses are 2+ on the right side and 2+ on the left side.      Heart sounds: No murmur heard.     No friction rub. No gallop.   Pulmonary:      Effort: Pulmonary effort is normal. No respiratory distress.      Breath sounds: Normal breath sounds. No wheezing or rhonchi.   Abdominal:      General: Bowel sounds are normal. There is no distension.      Palpations: Abdomen is soft.      Tenderness: There is no abdominal tenderness.      Comments:       Musculoskeletal:         General: No swelling.   Feet:      Right foot:      Protective Sensation: 3 sites tested.  3 sites sensed.      Skin integrity: Skin integrity normal. No ulcer or blister.      Toenail Condition: Right toenails are normal.      Left foot:      Protective Sensation: 3 sites tested.  3 sites sensed.      Skin integrity: Skin integrity normal. No ulcer or blister.      Toenail Condition: Left toenails are normal.      Comments: Diabetic Foot Exam Performed and Monofilament Test Performed     Skin:     General: Skin is warm and dry.   Neurological:      Mental Status: She is alert and oriented to person, place, and time.      Cranial Nerves: No cranial nerve deficit.      Gait: Gait normal.   Psychiatric:         Mood and Affect: Mood and affect normal.         Behavior: Behavior normal.         Thought Content: Thought content normal.         Judgment: Judgment normal.         The following data was reviewed by SHANNA Felix on  02/04/2025        Assessment & Plan   Diagnoses and all orders for this visit:    1. Essential hypertension (Primary)  Assessment & Plan:  She will get labs today, will follow up for labs in a month for neph, she will continue her current rx's and monitoring her BP     Orders:  -     Comprehensive Metabolic Panel; Future  -     atenolol (TENORMIN) 25 MG tablet; Take 1 tablet by mouth Daily.  Dispense: 90 tablet; Refill: 1    2. Mixed hyperlipidemia  Assessment & Plan:   Continue current medication and efforts with diet and exercise.       Orders:  -     Comprehensive Metabolic Panel; Future  -     Lipid Panel; Future  -     rosuvastatin (CRESTOR) 5 MG tablet; Take 1 tablet by mouth Daily.  Dispense: 90 tablet; Refill: 1    3. Type 2 diabetes mellitus with diabetic nephropathy, without long-term current use of insulin  Assessment & Plan:  Checking labs, To work on diet, regular exercise, monitor sugars, check feet daily, see optometrist yearly or as directed.      Orders:  -     Comprehensive Metabolic Panel; Future  -     Lipid Panel; Future  -     Hemoglobin A1c; Future    4. Hypothyroidism (acquired)  Assessment & Plan:  Pending lab results, if TSH is stable, continue current medication and to follow up in 6 months. Make sure to take medication on an empty stomach and with no other medications.       Orders:  -     TSH Rfx On Abnormal To Free T4  -     Synthroid 112 MCG tablet; Take 1 tablet by mouth Daily.  Dispense: 90 tablet; Refill: 1    5. Routine general medical examination at a health care facility  Assessment & Plan:  Advise regular exercise, healthy eating, always wear seat belts. Living will discussed, booklet given and to complete and bring in a copy, fall prevention discussed.  Immunizations discussed, UTD.   To continue yearly optometry and dental exams.    Continue monthly BSE and discussed normal recent mammogram, pap smears, colon screening, declines today       Orders:  -     TSH Rfx On Abnormal  To Free T4  -     Comprehensive Metabolic Panel; Future  -     Lipid Panel; Future  -     Hemoglobin A1c; Future  -     CBC w AUTO Differential; Future    6. Postmenopausal  Assessment & Plan:  Scheduling dexa, reviewed last dexa     Orders:  -     DEXA Bone Density Axial; Future    7. Acute maxillary sinusitis, recurrence not specified  Assessment & Plan:  Rest, increase fluids, follow up if symptoms progress or change   Try sallie med sinus rinse, continue mucinex    Orders:  -     cefdinir (OMNICEF) 300 MG capsule; Take 1 capsule by mouth 2 (Two) Times a Day.  Dispense: 20 capsule; Refill: 0    8. Other insomnia  Assessment & Plan:  continue rx    Orders:  -     traZODone (DESYREL) 50 MG tablet; Take 1 tablet by mouth Every Night.  Dispense: 90 tablet; Refill: 1    9. Nausea  Assessment & Plan:  Refilled phenergan to take prn     Orders:  -     promethazine (PHENERGAN) 25 MG tablet; Take 1/2 to 1 tablet by mouth Every 6 (Six) Hours As Needed for Nausea  Dispense: 30 tablet; Refill: 1    10. Other mixed anxiety disorders  Assessment & Plan:  continue rx's     Orders:  -     DULoxetine (CYMBALTA) 60 MG capsule; Take 1 capsule by mouth Daily.  Dispense: 90 capsule; Refill: 1  -     buPROPion XL (WELLBUTRIN XL) 300 MG 24 hr tablet; Take 1 tablet by mouth Daily.  Dispense: 90 tablet; Refill: 1    11. CKD stage 3 due to type 2 diabetes mellitus  Assessment & Plan:  Has upcoming lab and follow up next month with neph            BMI is >= 25 and <30. (Overweight) The following options were offered after discussion;: exercise counseling/recommendations and nutrition counseling/recommendations       FOLLOW UP  Return if symptoms worsen or fail to improve, for followup pending lab results.  Patient was given instructions and counseling regarding her condition or for health maintenance advice. Please see specific information pulled into the AVS if appropriate.     Cecile Dennis, APRN  02/04/25  09:27 EST

## 2025-02-04 NOTE — ASSESSMENT & PLAN NOTE
Advise regular exercise, healthy eating, always wear seat belts. Living will discussed, booklet given and to complete and bring in a copy, fall prevention discussed.  Immunizations discussed, UTD.   To continue yearly optometry and dental exams.    Continue monthly BSE and discussed normal recent mammogram, pap smears, colon screening, declines today

## 2025-02-12 ENCOUNTER — HOSPITAL ENCOUNTER (OUTPATIENT)
Dept: BONE DENSITY | Facility: HOSPITAL | Age: 70
Discharge: HOME | End: 2025-02-12
Admitting: NURSE PRACTITIONER
Payer: MEDICARE

## 2025-02-12 DIAGNOSIS — Z78.0 POSTMENOPAUSAL: ICD-10-CM

## 2025-02-12 PROCEDURE — 77080 DXA BONE DENSITY AXIAL: CPT

## 2025-02-17 DIAGNOSIS — F41.3 OTHER MIXED ANXIETY DISORDERS: ICD-10-CM

## 2025-02-18 RX ORDER — DULOXETIN HYDROCHLORIDE 60 MG/1
60 CAPSULE, DELAYED RELEASE ORAL 2 TIMES DAILY
Qty: 180 CAPSULE | OUTPATIENT
Start: 2025-02-18

## 2025-02-18 NOTE — TELEPHONE ENCOUNTER
[facility] 1 refill.

## 2025-02-19 DIAGNOSIS — F41.3 OTHER MIXED ANXIETY DISORDERS: ICD-10-CM

## 2025-02-19 RX ORDER — DULOXETIN HYDROCHLORIDE 60 MG/1
60 CAPSULE, DELAYED RELEASE ORAL 2 TIMES DAILY
Qty: 180 CAPSULE | OUTPATIENT
Start: 2025-02-19

## 2025-03-07 ENCOUNTER — LAB (OUTPATIENT)
Dept: LAB | Facility: HOSPITAL | Age: 70
End: 2025-03-07
Payer: MEDICARE

## 2025-03-07 ENCOUNTER — TRANSCRIBE ORDERS (OUTPATIENT)
Dept: ADMINISTRATIVE | Facility: HOSPITAL | Age: 70
End: 2025-03-07
Payer: MEDICARE

## 2025-03-07 DIAGNOSIS — D64.9 ANEMIA, UNSPECIFIED TYPE: Primary | ICD-10-CM

## 2025-03-07 DIAGNOSIS — E53.8 B12 DEFICIENCY: ICD-10-CM

## 2025-03-07 DIAGNOSIS — N18.30 STAGE 3 CHRONIC KIDNEY DISEASE, UNSPECIFIED WHETHER STAGE 3A OR 3B CKD: ICD-10-CM

## 2025-03-07 DIAGNOSIS — I10 BENIGN HYPERTENSION: ICD-10-CM

## 2025-03-07 DIAGNOSIS — E55.9 VITAMIN D DEFICIENCY: ICD-10-CM

## 2025-03-07 DIAGNOSIS — D64.9 ANEMIA, UNSPECIFIED TYPE: ICD-10-CM

## 2025-03-07 LAB
BASOPHILS # BLD AUTO: 0.04 10*3/MM3 (ref 0–0.2)
BASOPHILS NFR BLD AUTO: 0.5 % (ref 0–1.5)
DEPRECATED RDW RBC AUTO: 51.5 FL (ref 37–54)
EOSINOPHIL # BLD AUTO: 0.4 10*3/MM3 (ref 0–0.4)
EOSINOPHIL NFR BLD AUTO: 4.7 % (ref 0.3–6.2)
ERYTHROCYTE [DISTWIDTH] IN BLOOD BY AUTOMATED COUNT: 14.9 % (ref 12.3–15.4)
HCT VFR BLD AUTO: 40.1 % (ref 34–46.6)
HGB BLD-MCNC: 12.5 G/DL (ref 12–15.9)
IMM GRANULOCYTES # BLD AUTO: 0.01 10*3/MM3 (ref 0–0.05)
IMM GRANULOCYTES NFR BLD AUTO: 0.1 % (ref 0–0.5)
LYMPHOCYTES # BLD AUTO: 3.96 10*3/MM3 (ref 0.7–3.1)
LYMPHOCYTES NFR BLD AUTO: 46.3 % (ref 19.6–45.3)
MCH RBC QN AUTO: 28.5 PG (ref 26.6–33)
MCHC RBC AUTO-ENTMCNC: 31.2 G/DL (ref 31.5–35.7)
MCV RBC AUTO: 91.3 FL (ref 79–97)
MONOCYTES # BLD AUTO: 0.73 10*3/MM3 (ref 0.1–0.9)
MONOCYTES NFR BLD AUTO: 8.5 % (ref 5–12)
NEUTROPHILS NFR BLD AUTO: 3.41 10*3/MM3 (ref 1.7–7)
NEUTROPHILS NFR BLD AUTO: 39.9 % (ref 42.7–76)
PLATELET # BLD AUTO: 290 10*3/MM3 (ref 140–450)
PMV BLD AUTO: 9.3 FL (ref 6–12)
RBC # BLD AUTO: 4.39 10*6/MM3 (ref 3.77–5.28)
WBC NRBC COR # BLD AUTO: 8.55 10*3/MM3 (ref 3.4–10.8)

## 2025-03-07 PROCEDURE — 82607 VITAMIN B-12: CPT

## 2025-03-07 PROCEDURE — 83540 ASSAY OF IRON: CPT

## 2025-03-07 PROCEDURE — 82746 ASSAY OF FOLIC ACID SERUM: CPT

## 2025-03-07 PROCEDURE — 82306 VITAMIN D 25 HYDROXY: CPT

## 2025-03-07 PROCEDURE — 84466 ASSAY OF TRANSFERRIN: CPT

## 2025-03-07 PROCEDURE — 80053 COMPREHEN METABOLIC PANEL: CPT

## 2025-03-07 PROCEDURE — 36415 COLL VENOUS BLD VENIPUNCTURE: CPT

## 2025-03-07 PROCEDURE — 85025 COMPLETE CBC W/AUTO DIFF WBC: CPT

## 2025-03-08 LAB
25(OH)D3 SERPL-MCNC: 47.7 NG/ML (ref 30–100)
ALBUMIN SERPL-MCNC: 3.9 G/DL (ref 3.5–5.2)
ALBUMIN/GLOB SERPL: 1.4 G/DL
ALP SERPL-CCNC: 138 U/L (ref 39–117)
ALT SERPL W P-5'-P-CCNC: 37 U/L (ref 1–33)
ANION GAP SERPL CALCULATED.3IONS-SCNC: 6.8 MMOL/L (ref 5–15)
AST SERPL-CCNC: 39 U/L (ref 1–32)
BILIRUB SERPL-MCNC: 0.4 MG/DL (ref 0–1.2)
BUN SERPL-MCNC: 20 MG/DL (ref 8–23)
BUN/CREAT SERPL: 17.7 (ref 7–25)
CALCIUM SPEC-SCNC: 9.4 MG/DL (ref 8.6–10.5)
CHLORIDE SERPL-SCNC: 105 MMOL/L (ref 98–107)
CO2 SERPL-SCNC: 27.2 MMOL/L (ref 22–29)
CREAT SERPL-MCNC: 1.13 MG/DL (ref 0.57–1)
EGFRCR SERPLBLD CKD-EPI 2021: 52.8 ML/MIN/1.73
FOLATE SERPL-MCNC: 11.9 NG/ML (ref 4.78–24.2)
GLOBULIN UR ELPH-MCNC: 2.7 GM/DL
GLUCOSE SERPL-MCNC: 86 MG/DL (ref 65–99)
IRON 24H UR-MRATE: 97 MCG/DL (ref 37–145)
IRON SATN MFR SERPL: 24 % (ref 20–50)
POTASSIUM SERPL-SCNC: 4.6 MMOL/L (ref 3.5–5.2)
PROT SERPL-MCNC: 6.6 G/DL (ref 6–8.5)
SODIUM SERPL-SCNC: 139 MMOL/L (ref 136–145)
TIBC SERPL-MCNC: 410 MCG/DL (ref 298–536)
TRANSFERRIN SERPL-MCNC: 275 MG/DL (ref 200–360)
VIT B12 BLD-MCNC: 1799 PG/ML (ref 211–946)

## 2025-03-16 DIAGNOSIS — E11.9 TYPE 2 DIABETES MELLITUS WITHOUT COMPLICATION, WITHOUT LONG-TERM CURRENT USE OF INSULIN: ICD-10-CM

## 2025-05-22 DIAGNOSIS — R21 RASH: ICD-10-CM

## 2025-05-22 RX ORDER — TRIAMCINOLONE ACETONIDE 1 MG/G
CREAM TOPICAL
Qty: 45 G | Refills: 0 | Status: SHIPPED | OUTPATIENT
Start: 2025-05-22

## 2025-05-22 NOTE — TELEPHONE ENCOUNTER
Rx Refill Note  Requested Prescriptions     Pending Prescriptions Disp Refills    triamcinolone (KENALOG) 0.1 % cream [Pharmacy Med Name: TRIAMCINOLONE 0.1% CREAM] 45 g 0     Sig: APPLY TO THE AFFECTED AREA(S) 2 TIMES A DAY AS DIRECTED      Last office visit with prescribing clinician: 2/4/2025   Last telemedicine visit with prescribing clinician: Visit date not found   Next office visit with prescribing clinician: 8/4/2025  Last refill sent: 06/26/2024, 45g    Adrienne Dela Cruz LPN  05/22/25, 09:00 EDT

## 2025-07-15 ENCOUNTER — TRANSCRIBE ORDERS (OUTPATIENT)
Dept: ADMINISTRATIVE | Facility: HOSPITAL | Age: 70
End: 2025-07-15
Payer: MEDICARE

## 2025-07-15 ENCOUNTER — LAB (OUTPATIENT)
Dept: LAB | Facility: HOSPITAL | Age: 70
End: 2025-07-15
Payer: MEDICARE

## 2025-07-15 DIAGNOSIS — D64.9 ANEMIA, UNSPECIFIED TYPE: Primary | ICD-10-CM

## 2025-07-15 DIAGNOSIS — I10 BENIGN HYPERTENSION: ICD-10-CM

## 2025-07-15 DIAGNOSIS — E11.9 DIABETES MELLITUS WITHOUT COMPLICATION: ICD-10-CM

## 2025-07-15 DIAGNOSIS — N18.30 STAGE 3 CHRONIC KIDNEY DISEASE, UNSPECIFIED WHETHER STAGE 3A OR 3B CKD: ICD-10-CM

## 2025-07-15 DIAGNOSIS — E55.9 VITAMIN D DEFICIENCY: ICD-10-CM

## 2025-07-15 DIAGNOSIS — D64.9 ANEMIA, UNSPECIFIED TYPE: ICD-10-CM

## 2025-07-15 LAB
25(OH)D3 SERPL-MCNC: 37 NG/ML (ref 30–100)
ALBUMIN SERPL-MCNC: 3.9 G/DL (ref 3.5–5.2)
ALBUMIN/GLOB SERPL: 1.4 G/DL
ALP SERPL-CCNC: 128 U/L (ref 39–117)
ALT SERPL W P-5'-P-CCNC: 15 U/L (ref 1–33)
ANION GAP SERPL CALCULATED.3IONS-SCNC: 8.6 MMOL/L (ref 5–15)
AST SERPL-CCNC: 23 U/L (ref 1–32)
BASOPHILS # BLD AUTO: 0.02 10*3/MM3 (ref 0–0.2)
BASOPHILS NFR BLD AUTO: 0.3 % (ref 0–1.5)
BILIRUB SERPL-MCNC: 0.4 MG/DL (ref 0–1.2)
BILIRUB UR QL STRIP: NEGATIVE
BUN SERPL-MCNC: 22 MG/DL (ref 8–23)
BUN/CREAT SERPL: 19.5 (ref 7–25)
CALCIUM SPEC-SCNC: 9.8 MG/DL (ref 8.6–10.5)
CHLORIDE SERPL-SCNC: 104 MMOL/L (ref 98–107)
CLARITY UR: CLEAR
CO2 SERPL-SCNC: 24.4 MMOL/L (ref 22–29)
COLOR UR: YELLOW
CREAT SERPL-MCNC: 1.13 MG/DL (ref 0.57–1)
DEPRECATED RDW RBC AUTO: 51 FL (ref 37–54)
EGFRCR SERPLBLD CKD-EPI 2021: 52.8 ML/MIN/1.73
EOSINOPHIL # BLD AUTO: 0.26 10*3/MM3 (ref 0–0.4)
EOSINOPHIL NFR BLD AUTO: 3.7 % (ref 0.3–6.2)
ERYTHROCYTE [DISTWIDTH] IN BLOOD BY AUTOMATED COUNT: 14.8 % (ref 12.3–15.4)
FERRITIN SERPL-MCNC: 39.4 NG/ML (ref 13–150)
FOLATE SERPL-MCNC: 9.69 NG/ML (ref 4.78–24.2)
GLOBULIN UR ELPH-MCNC: 2.7 GM/DL
GLUCOSE SERPL-MCNC: 117 MG/DL (ref 65–99)
GLUCOSE UR STRIP-MCNC: NEGATIVE MG/DL
HCT VFR BLD AUTO: 39.6 % (ref 34–46.6)
HGB BLD-MCNC: 13 G/DL (ref 12–15.9)
HGB UR QL STRIP.AUTO: NEGATIVE
IMM GRANULOCYTES # BLD AUTO: 0 10*3/MM3 (ref 0–0.05)
IMM GRANULOCYTES NFR BLD AUTO: 0 % (ref 0–0.5)
IRON 24H UR-MRATE: 93 MCG/DL (ref 37–145)
IRON SATN MFR SERPL: 24 % (ref 20–50)
KETONES UR QL STRIP: NEGATIVE
LEUKOCYTE ESTERASE UR QL STRIP.AUTO: NEGATIVE
LYMPHOCYTES # BLD AUTO: 2.95 10*3/MM3 (ref 0.7–3.1)
LYMPHOCYTES NFR BLD AUTO: 41.7 % (ref 19.6–45.3)
MCH RBC QN AUTO: 30.1 PG (ref 26.6–33)
MCHC RBC AUTO-ENTMCNC: 32.8 G/DL (ref 31.5–35.7)
MCV RBC AUTO: 91.7 FL (ref 79–97)
MONOCYTES # BLD AUTO: 0.51 10*3/MM3 (ref 0.1–0.9)
MONOCYTES NFR BLD AUTO: 7.2 % (ref 5–12)
NEUTROPHILS NFR BLD AUTO: 3.33 10*3/MM3 (ref 1.7–7)
NEUTROPHILS NFR BLD AUTO: 47.1 % (ref 42.7–76)
NITRITE UR QL STRIP: NEGATIVE
PH UR STRIP.AUTO: 7 [PH] (ref 5–8)
PLATELET # BLD AUTO: 299 10*3/MM3 (ref 140–450)
PMV BLD AUTO: 9.5 FL (ref 6–12)
POTASSIUM SERPL-SCNC: 4.6 MMOL/L (ref 3.5–5.2)
PROT SERPL-MCNC: 6.6 G/DL (ref 6–8.5)
PROT UR QL STRIP: NEGATIVE
RBC # BLD AUTO: 4.32 10*6/MM3 (ref 3.77–5.28)
SODIUM SERPL-SCNC: 137 MMOL/L (ref 136–145)
SP GR UR STRIP: 1.02 (ref 1–1.03)
TIBC SERPL-MCNC: 392 MCG/DL (ref 298–536)
TRANSFERRIN SERPL-MCNC: 263 MG/DL (ref 200–360)
UROBILINOGEN UR QL STRIP: NORMAL
VIT B12 BLD-MCNC: 640 PG/ML (ref 211–946)
WBC NRBC COR # BLD AUTO: 7.07 10*3/MM3 (ref 3.4–10.8)

## 2025-07-15 PROCEDURE — 36415 COLL VENOUS BLD VENIPUNCTURE: CPT

## 2025-07-15 PROCEDURE — 81003 URINALYSIS AUTO W/O SCOPE: CPT

## 2025-07-15 PROCEDURE — 82306 VITAMIN D 25 HYDROXY: CPT

## 2025-07-15 PROCEDURE — 82746 ASSAY OF FOLIC ACID SERUM: CPT

## 2025-07-15 PROCEDURE — 80053 COMPREHEN METABOLIC PANEL: CPT

## 2025-07-15 PROCEDURE — 85025 COMPLETE CBC W/AUTO DIFF WBC: CPT

## 2025-07-15 PROCEDURE — 83540 ASSAY OF IRON: CPT

## 2025-07-15 PROCEDURE — 82607 VITAMIN B-12: CPT

## 2025-07-15 PROCEDURE — 82728 ASSAY OF FERRITIN: CPT

## 2025-07-15 PROCEDURE — 84466 ASSAY OF TRANSFERRIN: CPT

## 2025-07-28 DIAGNOSIS — F41.3 OTHER MIXED ANXIETY DISORDERS: ICD-10-CM

## 2025-07-28 RX ORDER — BUPROPION HYDROCHLORIDE 300 MG/1
300 TABLET ORAL DAILY
Qty: 90 TABLET | Refills: 0 | Status: SHIPPED | OUTPATIENT
Start: 2025-07-28 | End: 2025-08-04 | Stop reason: SDUPTHER

## 2025-08-04 ENCOUNTER — OFFICE VISIT (OUTPATIENT)
Dept: FAMILY MEDICINE CLINIC | Age: 70
End: 2025-08-04
Payer: MEDICARE

## 2025-08-04 ENCOUNTER — LAB (OUTPATIENT)
Dept: LAB | Facility: HOSPITAL | Age: 70
End: 2025-08-04
Payer: MEDICARE

## 2025-08-04 VITALS
SYSTOLIC BLOOD PRESSURE: 106 MMHG | WEIGHT: 169 LBS | BODY MASS INDEX: 28.85 KG/M2 | OXYGEN SATURATION: 97 % | HEIGHT: 64 IN | DIASTOLIC BLOOD PRESSURE: 72 MMHG | HEART RATE: 60 BPM | TEMPERATURE: 97.9 F

## 2025-08-04 DIAGNOSIS — E78.2 MIXED HYPERLIPIDEMIA: ICD-10-CM

## 2025-08-04 DIAGNOSIS — E03.9 HYPOTHYROIDISM (ACQUIRED): ICD-10-CM

## 2025-08-04 DIAGNOSIS — F41.3 OTHER MIXED ANXIETY DISORDERS: ICD-10-CM

## 2025-08-04 DIAGNOSIS — E11.22 TYPE 2 DIABETES MELLITUS WITH CHRONIC KIDNEY DISEASE, WITHOUT LONG-TERM CURRENT USE OF INSULIN, UNSPECIFIED CKD STAGE: ICD-10-CM

## 2025-08-04 DIAGNOSIS — Z78.0 POSTMENOPAUSAL: ICD-10-CM

## 2025-08-04 DIAGNOSIS — Z86.69 HISTORY OF MIGRAINE: ICD-10-CM

## 2025-08-04 DIAGNOSIS — I10 ESSENTIAL HYPERTENSION: ICD-10-CM

## 2025-08-04 DIAGNOSIS — I10 ESSENTIAL HYPERTENSION: Primary | ICD-10-CM

## 2025-08-04 DIAGNOSIS — G47.09 OTHER INSOMNIA: ICD-10-CM

## 2025-08-04 LAB
ALBUMIN SERPL-MCNC: 3.9 G/DL (ref 3.5–5.2)
ALBUMIN/GLOB SERPL: 1.5 G/DL
ALP SERPL-CCNC: 117 U/L (ref 39–117)
ALT SERPL W P-5'-P-CCNC: 22 U/L (ref 1–33)
ANION GAP SERPL CALCULATED.3IONS-SCNC: 9 MMOL/L (ref 5–15)
AST SERPL-CCNC: 26 U/L (ref 1–32)
BILIRUB SERPL-MCNC: 0.3 MG/DL (ref 0–1.2)
BUN SERPL-MCNC: 19 MG/DL (ref 8–23)
BUN/CREAT SERPL: 17.4 (ref 7–25)
CALCIUM SPEC-SCNC: 9.3 MG/DL (ref 8.6–10.5)
CHLORIDE SERPL-SCNC: 106 MMOL/L (ref 98–107)
CHOLEST SERPL-MCNC: 160 MG/DL (ref 0–200)
CO2 SERPL-SCNC: 25 MMOL/L (ref 22–29)
CREAT SERPL-MCNC: 1.09 MG/DL (ref 0.57–1)
EGFRCR SERPLBLD CKD-EPI 2021: 55.1 ML/MIN/1.73
GLOBULIN UR ELPH-MCNC: 2.6 GM/DL
GLUCOSE SERPL-MCNC: 102 MG/DL (ref 65–99)
HBA1C MFR BLD: 5.9 % (ref 4.8–5.6)
HDLC SERPL-MCNC: 63 MG/DL (ref 40–60)
LDLC SERPL CALC-MCNC: 79 MG/DL (ref 0–100)
LDLC/HDLC SERPL: 1.23 {RATIO}
POTASSIUM SERPL-SCNC: 4.7 MMOL/L (ref 3.5–5.2)
PROT SERPL-MCNC: 6.5 G/DL (ref 6–8.5)
SODIUM SERPL-SCNC: 140 MMOL/L (ref 136–145)
TRIGL SERPL-MCNC: 98 MG/DL (ref 0–150)
TSH SERPL DL<=0.05 MIU/L-ACNC: 2.58 UIU/ML (ref 0.27–4.2)
VLDLC SERPL-MCNC: 18 MG/DL (ref 5–40)

## 2025-08-04 PROCEDURE — 3044F HG A1C LEVEL LT 7.0%: CPT | Performed by: NURSE PRACTITIONER

## 2025-08-04 PROCEDURE — 83036 HEMOGLOBIN GLYCOSYLATED A1C: CPT

## 2025-08-04 PROCEDURE — 3074F SYST BP LT 130 MM HG: CPT | Performed by: NURSE PRACTITIONER

## 2025-08-04 PROCEDURE — 1159F MED LIST DOCD IN RCRD: CPT | Performed by: NURSE PRACTITIONER

## 2025-08-04 PROCEDURE — 80053 COMPREHEN METABOLIC PANEL: CPT

## 2025-08-04 PROCEDURE — 80061 LIPID PANEL: CPT

## 2025-08-04 PROCEDURE — 1126F AMNT PAIN NOTED NONE PRSNT: CPT | Performed by: NURSE PRACTITIONER

## 2025-08-04 PROCEDURE — 36415 COLL VENOUS BLD VENIPUNCTURE: CPT

## 2025-08-04 PROCEDURE — 1160F RVW MEDS BY RX/DR IN RCRD: CPT | Performed by: NURSE PRACTITIONER

## 2025-08-04 PROCEDURE — 99214 OFFICE O/P EST MOD 30 MIN: CPT | Performed by: NURSE PRACTITIONER

## 2025-08-04 PROCEDURE — 84443 ASSAY THYROID STIM HORMONE: CPT

## 2025-08-04 PROCEDURE — 3078F DIAST BP <80 MM HG: CPT | Performed by: NURSE PRACTITIONER

## 2025-08-04 RX ORDER — LEVOTHYROXINE SODIUM 112 MCG
112 TABLET ORAL DAILY
Qty: 90 TABLET | Refills: 1 | Status: SHIPPED | OUTPATIENT
Start: 2025-08-04

## 2025-08-04 RX ORDER — TRAZODONE HYDROCHLORIDE 50 MG/1
50 TABLET ORAL NIGHTLY
Qty: 90 TABLET | Refills: 1 | Status: SHIPPED | OUTPATIENT
Start: 2025-08-04

## 2025-08-04 RX ORDER — SUMATRIPTAN 50 MG/1
TABLET, FILM COATED ORAL
Qty: 9 TABLET | Refills: 2 | Status: SHIPPED | OUTPATIENT
Start: 2025-08-04

## 2025-08-04 RX ORDER — BUPROPION HYDROCHLORIDE 300 MG/1
300 TABLET ORAL DAILY
Qty: 90 TABLET | Refills: 1 | Status: SHIPPED | OUTPATIENT
Start: 2025-08-04

## 2025-08-04 RX ORDER — HYDROXYZINE HYDROCHLORIDE 25 MG/1
25 TABLET, FILM COATED ORAL 3 TIMES DAILY PRN
Qty: 60 TABLET | Refills: 1 | Status: SHIPPED | OUTPATIENT
Start: 2025-08-04

## 2025-08-04 RX ORDER — DULOXETIN HYDROCHLORIDE 60 MG/1
60 CAPSULE, DELAYED RELEASE ORAL DAILY
Qty: 90 CAPSULE | Refills: 1 | Status: SHIPPED | OUTPATIENT
Start: 2025-08-04

## 2025-08-04 RX ORDER — LISINOPRIL 5 MG/1
5 TABLET ORAL DAILY
Qty: 90 TABLET | Refills: 1 | Status: SHIPPED | OUTPATIENT
Start: 2025-08-04

## 2025-08-04 RX ORDER — ROSUVASTATIN CALCIUM 5 MG/1
5 TABLET, COATED ORAL DAILY
Qty: 90 TABLET | Refills: 1 | Status: SHIPPED | OUTPATIENT
Start: 2025-08-04

## 2025-08-04 RX ORDER — ATENOLOL 25 MG/1
25 TABLET ORAL DAILY
Qty: 90 TABLET | Refills: 1 | Status: SHIPPED | OUTPATIENT
Start: 2025-08-04

## 2025-08-06 DIAGNOSIS — I10 ESSENTIAL HYPERTENSION: ICD-10-CM

## 2025-08-06 RX ORDER — ATENOLOL 25 MG/1
25 TABLET ORAL DAILY
Qty: 90 TABLET | Refills: 1 | OUTPATIENT
Start: 2025-08-06